# Patient Record
Sex: FEMALE | Race: WHITE | Employment: FULL TIME | URBAN - METROPOLITAN AREA
[De-identification: names, ages, dates, MRNs, and addresses within clinical notes are randomized per-mention and may not be internally consistent; named-entity substitution may affect disease eponyms.]

---

## 2020-03-18 ENCOUNTER — OFFICE VISIT (OUTPATIENT)
Dept: FAMILY MEDICINE CLINIC | Age: 65
End: 2020-03-18
Payer: COMMERCIAL

## 2020-03-18 VITALS
DIASTOLIC BLOOD PRESSURE: 86 MMHG | HEART RATE: 70 BPM | WEIGHT: 165.2 LBS | OXYGEN SATURATION: 98 % | TEMPERATURE: 98.2 F | HEIGHT: 67 IN | BODY MASS INDEX: 25.93 KG/M2 | SYSTOLIC BLOOD PRESSURE: 132 MMHG

## 2020-03-18 LAB
BILIRUBIN, POC: NEGATIVE
BLOOD URINE, POC: NEGATIVE
CLARITY, POC: CLEAR
COLOR, POC: YELLOW
GLUCOSE URINE, POC: NEGATIVE
KETONES, POC: NEGATIVE
LEUKOCYTE EST, POC: NORMAL
NITRITE, POC: NEGATIVE
PH, POC: 5
PROTEIN, POC: NEGATIVE
SPECIFIC GRAVITY, POC: 1.03
UROBILINOGEN, POC: NEGATIVE

## 2020-03-18 PROCEDURE — 99386 PREV VISIT NEW AGE 40-64: CPT | Performed by: NURSE PRACTITIONER

## 2020-03-18 PROCEDURE — 81003 URINALYSIS AUTO W/O SCOPE: CPT | Performed by: NURSE PRACTITIONER

## 2020-03-18 RX ORDER — ATORVASTATIN CALCIUM 20 MG/1
20 TABLET, FILM COATED ORAL DAILY
Qty: 30 TABLET | Refills: 5 | Status: SHIPPED | OUTPATIENT
Start: 2020-03-18 | End: 2020-09-14

## 2020-03-18 RX ORDER — OMEPRAZOLE 20 MG/1
20 CAPSULE, DELAYED RELEASE ORAL DAILY
Qty: 30 CAPSULE | Refills: 5 | Status: SHIPPED | OUTPATIENT
Start: 2020-03-18 | End: 2021-07-20

## 2020-03-18 RX ORDER — MULTIVIT WITH MINERALS/LUTEIN
1000 TABLET ORAL DAILY
COMMUNITY

## 2020-03-18 RX ORDER — OMEPRAZOLE 20 MG/1
20 CAPSULE, DELAYED RELEASE ORAL DAILY
COMMUNITY
End: 2020-03-18 | Stop reason: SDUPTHER

## 2020-03-18 RX ORDER — ATORVASTATIN CALCIUM 20 MG/1
20 TABLET, FILM COATED ORAL DAILY
COMMUNITY
End: 2020-03-18 | Stop reason: SDUPTHER

## 2020-03-18 SDOH — SOCIAL STABILITY: SOCIAL INSECURITY: WITHIN THE LAST YEAR, HAVE YOU BEEN AFRAID OF YOUR PARTNER OR EX-PARTNER?: PATIENT DECLINED

## 2020-03-18 SDOH — ECONOMIC STABILITY: FOOD INSECURITY: WITHIN THE PAST 12 MONTHS, THE FOOD YOU BOUGHT JUST DIDN'T LAST AND YOU DIDN'T HAVE MONEY TO GET MORE.: NEVER TRUE

## 2020-03-18 SDOH — ECONOMIC STABILITY: TRANSPORTATION INSECURITY
IN THE PAST 12 MONTHS, HAS LACK OF TRANSPORTATION KEPT YOU FROM MEETINGS, WORK, OR FROM GETTING THINGS NEEDED FOR DAILY LIVING?: NO

## 2020-03-18 SDOH — HEALTH STABILITY: MENTAL HEALTH
STRESS IS WHEN SOMEONE FEELS TENSE, NERVOUS, ANXIOUS, OR CAN'T SLEEP AT NIGHT BECAUSE THEIR MIND IS TROUBLED. HOW STRESSED ARE YOU?: ONLY A LITTLE

## 2020-03-18 SDOH — HEALTH STABILITY: PHYSICAL HEALTH: ON AVERAGE, HOW MANY MINUTES DO YOU ENGAGE IN EXERCISE AT THIS LEVEL?: 0 MIN

## 2020-03-18 SDOH — SOCIAL STABILITY: SOCIAL INSECURITY
WITHIN THE LAST YEAR, HAVE YOU BEEN HUMILIATED OR EMOTIONALLY ABUSED IN OTHER WAYS BY YOUR PARTNER OR EX-PARTNER?: PATIENT DECLINED

## 2020-03-18 SDOH — SOCIAL STABILITY: SOCIAL INSECURITY
WITHIN THE LAST YEAR, HAVE TO BEEN RAPED OR FORCED TO HAVE ANY KIND OF SEXUAL ACTIVITY BY YOUR PARTNER OR EX-PARTNER?: PATIENT DECLINED

## 2020-03-18 SDOH — SOCIAL STABILITY: SOCIAL NETWORK
IN A TYPICAL WEEK, HOW MANY TIMES DO YOU TALK ON THE PHONE WITH FAMILY, FRIENDS, OR NEIGHBORS?: MORE THAN THREE TIMES A WEEK

## 2020-03-18 SDOH — SOCIAL STABILITY: SOCIAL NETWORK
DO YOU BELONG TO ANY CLUBS OR ORGANIZATIONS SUCH AS CHURCH GROUPS UNIONS, FRATERNAL OR ATHLETIC GROUPS, OR SCHOOL GROUPS?: NO

## 2020-03-18 SDOH — SOCIAL STABILITY: SOCIAL NETWORK: HOW OFTEN DO YOU GET TOGETHER WITH FRIENDS OR RELATIVES?: THREE TIMES A WEEK

## 2020-03-18 SDOH — HEALTH STABILITY: PHYSICAL HEALTH: ON AVERAGE, HOW MANY DAYS PER WEEK DO YOU ENGAGE IN MODERATE TO STRENUOUS EXERCISE (LIKE A BRISK WALK)?: 0 DAYS

## 2020-03-18 SDOH — SOCIAL STABILITY: SOCIAL NETWORK: HOW OFTEN DO YOU ATTENT MEETINGS OF THE CLUB OR ORGANIZATION YOU BELONG TO?: NEVER

## 2020-03-18 SDOH — SOCIAL STABILITY: SOCIAL INSECURITY
WITHIN THE LAST YEAR, HAVE YOU BEEN KICKED, HIT, SLAPPED, OR OTHERWISE PHYSICALLY HURT BY YOUR PARTNER OR EX-PARTNER?: PATIENT DECLINED

## 2020-03-18 SDOH — ECONOMIC STABILITY: INCOME INSECURITY: HOW HARD IS IT FOR YOU TO PAY FOR THE VERY BASICS LIKE FOOD, HOUSING, MEDICAL CARE, AND HEATING?: NOT HARD AT ALL

## 2020-03-18 SDOH — SOCIAL STABILITY: SOCIAL NETWORK: ARE YOU MARRIED, WIDOWED, DIVORCED, SEPARATED, NEVER MARRIED, OR LIVING WITH A PARTNER?: DIVORCED

## 2020-03-18 SDOH — ECONOMIC STABILITY: FOOD INSECURITY: WITHIN THE PAST 12 MONTHS, YOU WORRIED THAT YOUR FOOD WOULD RUN OUT BEFORE YOU GOT MONEY TO BUY MORE.: NEVER TRUE

## 2020-03-18 SDOH — SOCIAL STABILITY: SOCIAL NETWORK: HOW OFTEN DO YOU ATTEND CHURCH OR RELIGIOUS SERVICES?: 1 TO 4 TIMES PER YEAR

## 2020-03-18 SDOH — ECONOMIC STABILITY: TRANSPORTATION INSECURITY
IN THE PAST 12 MONTHS, HAS THE LACK OF TRANSPORTATION KEPT YOU FROM MEDICAL APPOINTMENTS OR FROM GETTING MEDICATIONS?: NO

## 2020-03-18 ASSESSMENT — ENCOUNTER SYMPTOMS
SHORTNESS OF BREATH: 0
VOMITING: 0
SORE THROAT: 0
DIARRHEA: 0
NAUSEA: 0
CONSTIPATION: 0
RHINORRHEA: 0
COUGH: 0

## 2020-03-18 ASSESSMENT — PATIENT HEALTH QUESTIONNAIRE - PHQ9
1. LITTLE INTEREST OR PLEASURE IN DOING THINGS: 0
SUM OF ALL RESPONSES TO PHQ9 QUESTIONS 1 & 2: 0
2. FEELING DOWN, DEPRESSED OR HOPELESS: 0
SUM OF ALL RESPONSES TO PHQ QUESTIONS 1-9: 0
SUM OF ALL RESPONSES TO PHQ QUESTIONS 1-9: 0

## 2020-03-18 NOTE — PROGRESS NOTES
DepressionSeverity: 1-4 = Minimal depression, 5-9 = Mild depression, 10-14 = Moderate depression, 15-19 = Moderately severe depression, 20-27 = Severe depression    Current Outpatient Medications   Medication Sig Dispense Refill    GLUCOSAMINE-CHONDROITIN ER PO Take by mouth daily      Ascorbic Acid (VITAMIN C) 1000 MG tablet Take 1,000 mg by mouth daily      Multiple Vitamins-Minerals (MULTIVITAMIN WOMEN 50+ PO) Take by mouth      omeprazole (PRILOSEC) 20 MG delayed release capsule Take 1 capsule by mouth daily 30 capsule 5    atorvastatin (LIPITOR) 20 MG tablet Take 1 tablet by mouth daily 30 tablet 5     No current facility-administered medications for this visit. Presents to office today to establish care. Was previously following with Primary provider at [facility]. This office is closer and to her employment for appointments. History of GERD, high cholesterol, total hysterectomy, history of UTI. She is a . Her  passed away about one year ago. She is currently living with her two sisters in the home they grew up in. She reports they mostly get along and help each other when needed. She is still working. Denies smoking, alcohol use or drug use. Has a good appetite. Drinks water and coffee occasionally. Does not exercise daily. Only concern today is continued urinary incontinence; has to use incontinence pad daily. Reports she can feel something \"down there\" and does know what it is. Will do simple pelvic to assess today. Urinary Tract Infection    Pertinent negatives include no nausea, urgency or vomiting. Review of Systems   Constitutional: Negative for activity change, appetite change, fatigue and fever. HENT: Negative for congestion, rhinorrhea and sore throat. Due for dental cleaning  regular visits   Eyes: Negative for visual disturbance. Wears glasses  Exam next month   Respiratory: Negative for cough and shortness of breath.     Cardiovascular: Negative appointment with urology as soon as possible  Encouraged healthy diet and daily exercise  Follow up as discussed      Return in about 3 months (around 6/18/2020), or if symptoms worsen or fail to improve, for routine follow up. Dot received counseling on the following healthy behaviors: nutrition, exercise and medication adherence  Reviewed prior labs and health maintenance  Continue current medications, diet and exercise. Discussed use, benefit, and side effects of prescribed medications. Barriers to medication compliance addressed. Patient given educational materials - see patient instructions  Was a self-tracking handout given in paper form or via "[employer]t? Yes    Requested Prescriptions     Signed Prescriptions Disp Refills    omeprazole (PRILOSEC) 20 MG delayed release capsule 30 capsule 5     Sig: Take 1 capsule by mouth daily    atorvastatin (LIPITOR) 20 MG tablet 30 tablet 5     Sig: Take 1 tablet by mouth daily       All patient questions answered. Patient voiced understanding. Quality Measures    Body mass index is 25.87 kg/m². Elevated. Weight control planned discussed Healthy diet and regular exercise. BP: 132/86 Blood pressure is normal. Treatment plan consists of No treatment change needed.     No results found for: LDLCALC, LDLCHOLESTEROL, LDLDIRECT (goal LDL reduction with dx if diabetes is 50% LDL reduction)      PHQ Scores 3/18/2020   PHQ2 Score 0   PHQ9 Score 0     Interpretation of Total Score Depression Severity: 1-4 = Minimal depression, 5-9 = Mild depression, 10-14 = Moderate depression, 15-19 = Moderately severe depression, 20-27 = Severe depression          Electronically signed by ALANA Blas NP on 3/20/2020 at 3:37 PM

## 2020-05-20 ENCOUNTER — OFFICE VISIT (OUTPATIENT)
Dept: FAMILY MEDICINE CLINIC | Age: 65
End: 2020-05-20
Payer: COMMERCIAL

## 2020-05-20 VITALS
SYSTOLIC BLOOD PRESSURE: 120 MMHG | DIASTOLIC BLOOD PRESSURE: 80 MMHG | BODY MASS INDEX: 26.86 KG/M2 | WEIGHT: 171.5 LBS | TEMPERATURE: 98.2 F | OXYGEN SATURATION: 97 % | RESPIRATION RATE: 16 BRPM | HEART RATE: 73 BPM

## 2020-05-20 LAB
BILIRUBIN, POC: NEGATIVE
BLOOD URINE, POC: NEGATIVE
CLARITY, POC: CLEAR
COLOR, POC: YELLOW
GLUCOSE URINE, POC: NEGATIVE
KETONES, POC: NEGATIVE
LEUKOCYTE EST, POC: NEGATIVE
NITRITE, POC: NEGATIVE
PH, POC: 5
PROTEIN, POC: NEGATIVE
SPECIFIC GRAVITY, POC: >1.03
UROBILINOGEN, POC: NORMAL

## 2020-05-20 PROCEDURE — 99213 OFFICE O/P EST LOW 20 MIN: CPT | Performed by: NURSE PRACTITIONER

## 2020-05-20 PROCEDURE — 81002 URINALYSIS NONAUTO W/O SCOPE: CPT | Performed by: NURSE PRACTITIONER

## 2020-05-20 ASSESSMENT — ENCOUNTER SYMPTOMS
ABDOMINAL DISTENTION: 0
SHORTNESS OF BREATH: 0
WHEEZING: 0
CHEST TIGHTNESS: 0
ABDOMINAL PAIN: 0
CONSTIPATION: 1
COUGH: 0

## 2020-05-20 NOTE — PROGRESS NOTES
dizziness, syncope, weakness, light-headedness, numbness and headaches. Psychiatric/Behavioral: Positive for sleep disturbance. Negative for agitation and decreased concentration. The patient is not nervous/anxious. Objective:     Physical Exam  Vitals signs and nursing note reviewed. Constitutional:       Appearance: Normal appearance. She is well-developed and well-groomed. She is obese. Cardiovascular:      Rate and Rhythm: Normal rate and regular rhythm. Pulses: Normal pulses. Carotid pulses are 2+ on the right side and 2+ on the left side. Radial pulses are 2+ on the right side and 2+ on the left side. Heart sounds: Normal heart sounds, S1 normal and S2 normal. No murmur. Pulmonary:      Effort: Pulmonary effort is normal. No respiratory distress. Breath sounds: Normal breath sounds and air entry. No wheezing or rales. Abdominal:      General: Abdomen is protuberant. Bowel sounds are normal. There is no distension. Palpations: Abdomen is soft. Tenderness: There is no abdominal tenderness. There is no right CVA tenderness or left CVA tenderness. Musculoskeletal: Normal range of motion. Right lower leg: No edema. Left lower leg: No edema. Skin:     General: Skin is warm and dry. Capillary Refill: Capillary refill takes less than 2 seconds. Neurological:      Mental Status: She is alert and oriented to person, place, and time. Psychiatric:         Attention and Perception: Attention and perception normal.         Mood and Affect: Mood and affect normal.         Speech: Speech normal.         Behavior: Behavior normal. Behavior is cooperative. Thought Content: Thought content normal.         Cognition and Memory: Cognition and memory normal.         Judgment: Judgment normal.          Assessment:      1. Dysuria  - Urinalysis Reflex to Culture; Future -negative urinalysis    2.  Cystocele, unspecified (CODE)  Referral reprinted for urology and fax to their office    Plan:      Return for Call if systems do not improve or get worse. No orders of the defined types were placed in this encounter. No orders of the defined types were placed in this encounter. Reviewed health maintenance, prior labs and imaging. Patient given educational materials - see patient instructions. Discussed use, benefit, and side effects of prescribed medications. Barriers to medication compliance addressed. All patient questions answered. Pt voiced understanding to plan of care. Instructed to continue current medications, diet and exercise. Patient agreed with treatment plan. Follow up as directed below. Communication:      Items for Patient/Writer/Staff to Yuriy 224  Patient encouraged to follow-up with urology due to known cystocele. Urinalysis within office was negative, therefore not treating for urinary tract infection. Patient told to stop taking old prescription of Keflex.      Quality Measures  BMI Readings from Last 1 Encounters:   05/20/20 26.86 kg/m²        No results found for: LABA1C    BP Readings from Last 3 Encounters:   05/20/20 120/80   03/18/20 132/86        The ASCVD Risk score (Rufino Baekr, et al., 2013) failed to calculate for the following reasons:    Cannot find a previous HDL lab    Cannot find a previous total cholesterol lab    Unable to determine if patient is Non-      PHQ Scores 3/18/2020   PHQ2 Score 0   PHQ9 Score 0     Interpretation of Total Score Depression Severity: 1-4 = Minimal depression, 5-9 = Mild depression, 10-14 = Moderate depression, 15-19 = Moderately severe depression, 20-27 = Severe depression     Electronically signed by MARIA FERNANDA Hylton on 5/20/2020 at 4:33 PM

## 2020-07-20 ENCOUNTER — OFFICE VISIT (OUTPATIENT)
Dept: OBGYN CLINIC | Age: 65
End: 2020-07-20
Payer: COMMERCIAL

## 2020-07-20 VITALS
DIASTOLIC BLOOD PRESSURE: 77 MMHG | SYSTOLIC BLOOD PRESSURE: 138 MMHG | HEIGHT: 67 IN | HEART RATE: 73 BPM | WEIGHT: 164 LBS | BODY MASS INDEX: 25.74 KG/M2

## 2020-07-20 PROBLEM — N81.6 POSTERIOR VAGINAL WALL PROLAPSE: Status: ACTIVE | Noted: 2020-07-20

## 2020-07-20 PROBLEM — N39.3 STRESS INCONTINENCE: Status: ACTIVE | Noted: 2020-07-20

## 2020-07-20 PROCEDURE — 99203 OFFICE O/P NEW LOW 30 MIN: CPT | Performed by: OBSTETRICS & GYNECOLOGY

## 2020-07-20 ASSESSMENT — ENCOUNTER SYMPTOMS
BACK PAIN: 0
COUGH: 0
ABDOMINAL PAIN: 0
SHORTNESS OF BREATH: 0

## 2020-07-20 NOTE — PROGRESS NOTES
[facility]  [facility] - [address] [facility]  Dept: [phone]  Dept Fax: [phone]    20    Chief Complaint   Patient presents with    Other     referred by Dr. Temple Staff for prolapse       Jeff Cameron 72 y.o.  female was referred to me by Dr Temple Staff for posterior vaginal prolapse. She was seeing him for stress incontinence and he recommended that she come here for her vaginal prolapse. She wears pads daily for her incontinence because she leaks every day. She says that she has noticed a bulge in the vagina and sometimes it hurts. She has had 3 SVDs, but denies any VAVD or forceps. She repeatedly lifts with her job and says at the end of the day the bulge is worse. She denies that the bulge ever came outside of the vagina. She denies any h/o constipation. She has a h/o TVH in 2017 with anterior and posterior repair. Review of Systems   Constitutional: Negative for chills and fever. HENT: Negative for congestion. Respiratory: Negative for cough and shortness of breath. Cardiovascular: Negative for chest pain and palpitations. Gastrointestinal: Negative for abdominal pain. Musculoskeletal: Negative for back pain. Neurological: Negative for dizziness and light-headedness. Psychiatric/Behavioral: The patient is not nervous/anxious. Gynecologic History  No LMP recorded. Patient has had a hysterectomy.   Contraception: post menopausal status  Last Pap:   Results: normal  Last Mammogram: 2019  Results: normal    Obstetric History  : 3  Para: 3  AB: 0    Past Medical History:   Diagnosis Date    Elevated cholesterol     GERD with stricture     Hypoglycemia      Past Surgical History:   Procedure Laterality Date    DILATION AND CURETTAGE      FOOT SURGERY      left big toe hemangiomaremoved, benign    HYSTERECTOMY  2007    TVH withBSO , A&P repair    HYSTERECTOMY      HYSTERECTOMY, TOTAL ABDOMINAL      HYSTEROSCOPY  03/2007    novasure    LAPAROSCOPY  03/2007    BTL     Allergies   Allergen Reactions    Bactrim Other (See Comments)     Palpitations      Ciprofloxacin     Nitrofurantoin     Sulfa Antibiotics     Zocor [Simvastatin] Other (See Comments)     Muscle cramps     Current Outpatient Medications   Medication Sig Dispense Refill    GLUCOSAMINE-CHONDROITIN ER PO Take by mouth daily      Ascorbic Acid (VITAMIN C) 1000 MG tablet Take 1,000 mg by mouth daily      Multiple Vitamins-Minerals (MULTIVITAMIN WOMEN 50+ PO) Take by mouth      omeprazole (PRILOSEC) 20 MG delayed release capsule Take 1 capsule by mouth daily 30 capsule 5    atorvastatin (LIPITOR) 20 MG tablet Take 1 tablet by mouth daily 30 tablet 5     No current facility-administered medications for this visit. 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   Social Needs    Financial resource strain: Not hard at all   Streak insecurity     Worry: Never true     Inability: Never true   [employer] needs     Medical: No     Non-medical: No   Tobacco Use    Smoking status: Never Smoker    Smokeless tobacco: Never Used   Substance and Sexual Activity    Alcohol use: No    Drug use: No    Sexual activity: Not Currently   Lifestyle    Physical activity     Days per week: 0 days     Minutes per session: 0 min    Stress: Only a little   Relationships    Social connections     Talks on phone: More than three times a week     Gets together:  Three times a week     Attends [employer] service: 1 to 4 times per year     Active member of club or organization: No     Attends meetings of clubs or organizations: Never     Relationship status:     Intimate partner violence     Fear of current or ex partner: Patient refused     Emotionally abused: Patient refused     Physically abused: Patient refused     Forced sexual activity: Patient refused   Other Topics Concern    Not on file   Social History Narrative    Not on file     Family History   Problem Relation Age of Onset    Heart Disease Maternal Grandmother         CHF    Stroke Paternal Grandmother     Heart Disease Mother     Cancer Mother     Heart Disease Father     Cancer Father        Physical exam Physical Exam  Constitutional:       Appearance: Normal appearance. She is normal weight. HENT:      Head: Normocephalic. Eyes:      Extraocular Movements: Extraocular movements intact. Neurological:      Mental Status: She is alert and oriented to person, place, and time. Psychiatric:         Mood and Affect: Mood normal.         Behavior: Behavior normal.         Thought Content: Thought content normal.         Judgment: Judgment normal.         Assessment/Plan  1. Posterior vaginal wall prolapse  2. Vaginal enterocele  - Discussed options of PT vs pessary vs surgery. Discussed that with pessaries they help with different things and one might be helpful for stress incontinence, but might not help with the prolapse while it might be vise versa with others. Discussed risks/benefits with each option. Pt would like to proceed with surgery to hopefully help both of her issues at one time. She understands that this will hopefully help make her quality of life better, but may not fix it completely. Discussed risks of surgery in normal detailed fashion. All questions answered. Will schedule pt for posterior colporrhaphy with Dr Michael Newell placing a midurethral sling.       3. Stress urinary incontinence  - Seeing Dr Michael Newell, she would like a joint procedure with him placing a sling    Will have pt see PCP for medical clearance    Melvin Ayers MD  [address]

## 2020-07-21 ENCOUNTER — TELEPHONE (OUTPATIENT)
Dept: FAMILY MEDICINE CLINIC | Age: 65
End: 2020-07-21

## 2020-07-21 NOTE — TELEPHONE ENCOUNTER
Patient called in and said she is going to need surgery from [facility] note ob-gyn patients rectum needs repaired and patient was told to call in for Pre-op clearance being that she is going to need to be cleared for Surgery. Patient was asked to give dates but she Scheduled appointment with PCP for Pre-Op clearance.   Patient said she is going to talk with both surgeons to see how soon they wanted to do the surgery patient scheduled pre-op for 08- but patient was going to contact both offices and providers as well to see if they wanted a pre-op clearance or were they going to do their own pre-op testing so patient kept scheduled pre-op for now patient needs a 4:30 or after appointment so patient said she will contact office if it needs to be pushed up sooner for Surgeon request.

## 2020-07-23 ENCOUNTER — TELEPHONE (OUTPATIENT)
Dept: OBGYN CLINIC | Age: 65
End: 2020-07-23

## 2020-07-29 ENCOUNTER — TELEPHONE (OUTPATIENT)
Dept: OBGYN CLINIC | Age: 65
End: 2020-07-29

## 2020-08-03 ENCOUNTER — TELEPHONE (OUTPATIENT)
Dept: OBGYN CLINIC | Age: 65
End: 2020-08-03

## 2020-08-19 ENCOUNTER — OFFICE VISIT (OUTPATIENT)
Dept: FAMILY MEDICINE CLINIC | Age: 65
End: 2020-08-19
Payer: COMMERCIAL

## 2020-08-19 VITALS
OXYGEN SATURATION: 98 % | BODY MASS INDEX: 25.58 KG/M2 | RESPIRATION RATE: 14 BRPM | HEIGHT: 67 IN | TEMPERATURE: 96.5 F | DIASTOLIC BLOOD PRESSURE: 80 MMHG | WEIGHT: 163 LBS | SYSTOLIC BLOOD PRESSURE: 120 MMHG | HEART RATE: 68 BPM

## 2020-08-19 PROCEDURE — 99214 OFFICE O/P EST MOD 30 MIN: CPT | Performed by: NURSE PRACTITIONER

## 2020-08-19 ASSESSMENT — PATIENT HEALTH QUESTIONNAIRE - PHQ9
1. LITTLE INTEREST OR PLEASURE IN DOING THINGS: 0
SUM OF ALL RESPONSES TO PHQ QUESTIONS 1-9: 0
SUM OF ALL RESPONSES TO PHQ QUESTIONS 1-9: 0
2. FEELING DOWN, DEPRESSED OR HOPELESS: 0
SUM OF ALL RESPONSES TO PHQ9 QUESTIONS 1 & 2: 0

## 2020-08-19 NOTE — PROGRESS NOTES
Subjective:      Patient ID: Gloria Tiwari is a 72 y.o. female. Visit Information    Have you changed or started any medications since your last visit including any over-the-counter medicines, vitamins, or herbal medicines? no   Are you having any side effects from any of your medications? -  no  Have you stopped taking any of your medications? Is so, why? -  no    Have you seen any other physician or provider since your last visit? Dr Caden Archibald   Have you had any other diagnostic tests since your last visit? No  Have you been seen in the emergency room and/or had an admission to a hospital since we last saw you? No  Have you had your routine dental cleaning in the past 6 months? yes -     Have you activated your [employer] account? If not, what are your barriers?  Yes     Patient Care Team:  ALANA Ramirez NP as PCP - General (Nurse Practitioner)  ALANA Ramirez NP as PCP - [facility] Provider    Medical History Review  Past Medical, Family, and Social History reviewed and does contribute to the patient presenting condition    Health Maintenance   Topic Date Due    Breast cancer screen  05/13/2005    Colon cancer screen colonoscopy  05/13/2005    DTaP/Tdap/Td vaccine (1 - Tdap) 09/09/2020 (Originally 5/13/1974)    Pneumococcal 65+ years Vaccine (1 of 1 - PPSV23) 02/19/2021 (Originally 5/13/2020)    Lipid screen  08/19/2021 (Originally 5/13/1965)    DEXA (modify frequency per FRAX score)  08/19/2021 (Originally 5/13/2010)    Shingles Vaccine (1 of 2) 08/19/2021 (Originally 5/13/2005)    Diabetes screen  08/19/2021 (Originally 5/13/1995)    Hepatitis C screen  08/19/2021 (Originally 1955)    HIV screen  08/19/2021 (Originally 5/13/1970)    Flu vaccine (1) 09/01/2020    Hepatitis A vaccine  Aged Out    Hepatitis B vaccine  Aged Out    Hib vaccine  Aged Out    Meningococcal (ACWY) vaccine  Aged Out     /80 (Site: Left Upper Arm, Position: Sitting, Cuff Size: Medium Adult) Pulse 68   Temp 96.5 °F (35.8 °C) (Temporal)   Resp 14   Ht 5' 7\" (1.702 m)   Wt 163 lb (73.9 kg)   SpO2 98%   BMI 25.53 kg/m²      PHQ Scores 8/19/2020 3/18/2020   PHQ2 Score 0 0   PHQ9 Score 0 0     Interpretation of Total Score DepressionSeverity: 1-4 = Minimal depression, 5-9 = Mild depression, 10-14 = Moderate depression, 15-19 = Moderately severe depression, 20-27 = Severe depression    Current Outpatient Medications   Medication Sig Dispense Refill    GLUCOSAMINE-CHONDROITIN ER PO Take by mouth daily      Ascorbic Acid (VITAMIN C) 1000 MG tablet Take 1,000 mg by mouth daily      Multiple Vitamins-Minerals (MULTIVITAMIN WOMEN 50+ PO) Take by mouth      omeprazole (PRILOSEC) 20 MG delayed release capsule Take 1 capsule by mouth daily 30 capsule 5    atorvastatin (LIPITOR) 20 MG tablet Take 1 tablet by mouth daily 30 tablet 5     No current facility-administered medications for this visit. Presents to office today for preop clearance for upcoming surgery. Preop testing is scheduled for August 28. Planned procedure includes :posterior colporrhaphy: repair of vaginal wall prolapse, vaginal enterocele, bladder suspension for stress urinary incontinence. Surgery is scheduled on September 6, 2020 with Dr. Dennys Richards. Patient also reports she will need [facility] paperwork. Intention is for her to be off work for 4 weeks. Explained to patient that I will need to review preop testing prior to clearing for surgery. As soon as that is complete, I can give my opinion. She verbalized understanding. She does not have any other concerns today. She has a good appetite. Drinking fluids. Normal bowel and bladder pattern. Sleeping okay. Denies any depression or anxiety concerns today. Review of Systems   Constitutional: Negative for activity change, appetite change, fatigue and fever. HENT: Negative for congestion, rhinorrhea and sore throat. Eyes: Negative for visual disturbance.         Wears glasses     Respiratory: Negative for cough and shortness of breath. Cardiovascular: Negative for chest pain and palpitations. Gastrointestinal: Negative for constipation, diarrhea, nausea and vomiting. History of GERD  History of esophageal stricture-stretched 2x   Genitourinary: Negative for dysuria and urgency. Bladder incontinence  Can feel something \"down there\"-upcoming surgery   Allergic/Immunologic: Positive for environmental allergies. Negative for immunocompromised state. Neurological: Negative for syncope, light-headedness and headaches. Psychiatric/Behavioral: Negative for decreased concentration, dysphoric mood, sleep disturbance and suicidal ideas. The patient is not nervous/anxious. Objective:   Physical Exam  Vitals signs and nursing note reviewed. Constitutional:       General: She is not in acute distress. Appearance: Normal appearance. She is well-developed and well-groomed. She is not ill-appearing. HENT:      Head: Normocephalic and atraumatic. Right Ear: Hearing and external ear normal.      Left Ear: Hearing and external ear normal.      Nose: Nose normal.      Mouth/Throat:      Lips: Pink. Mouth: Mucous membranes are moist.      Pharynx: Oropharynx is clear. Uvula midline. Eyes:      Conjunctiva/sclera: Conjunctivae normal.      Pupils: Pupils are equal, round, and reactive to light. Neck:      Musculoskeletal: Normal range of motion. Thyroid: No thyroid mass. Trachea: Trachea normal.   Cardiovascular:      Rate and Rhythm: Normal rate and regular rhythm. Pulses: Normal pulses. Carotid pulses are 2+ on the right side and 2+ on the left side. Radial pulses are 2+ on the right side and 2+ on the left side. Dorsalis pedis pulses are 2+ on the right side and 2+ on the left side. Posterior tibial pulses are 2+ on the right side and 2+ on the left side.       Heart sounds: Normal heart sounds, S1 normal and S2 normal. No murmur. Pulmonary:      Effort: Pulmonary effort is normal.      Breath sounds: Normal breath sounds. No decreased breath sounds or wheezing. Abdominal:      General: Bowel sounds are normal.      Palpations: Abdomen is soft. Tenderness: There is no abdominal tenderness. Musculoskeletal:      Right lower leg: No edema. Left lower leg: No edema. Lymphadenopathy:      Cervical: No cervical adenopathy. Skin:     General: Skin is warm and dry. Capillary Refill: Capillary refill takes less than 2 seconds. Neurological:      Mental Status: She is alert and oriented to person, place, and time. GCS: GCS eye subscore is 4. GCS verbal subscore is 5. GCS motor subscore is 6. Motor: Motor function is intact. Coordination: Coordination is intact. Gait: Gait is intact. Psychiatric:         Attention and Perception: Attention normal.         Mood and Affect: Mood normal.         Speech: Speech normal.         Behavior: Behavior normal. Behavior is cooperative. Thought Content: Thought content normal.         Cognition and Memory: Cognition normal.         Judgment: Judgment normal.         Assessment / Plan:     1. Pre-op evaluation  2. Posterior vaginal wall prolapse  3. Cystocele, unspecified (CODE)  Upcoming planned surgery    4. Breast cancer screening by mammogram  Routine  - TWIN DIGITAL SCREEN W OR WO CAD BILATERAL; Future    We will review preop testing once completed at the end of the month. Will give medical opinion at that time. Encouraged healthy diet  Encouraged adequate fluid intake  Encouraged consistent follow-up with all upcoming appointments  Monitor for any increase in symptoms  Call office with any questions or concerns    Return in about 3 months (around 11/19/2020) for follow up to testing.           Electronically signed by ALANA Hoff NP on 8/21/2020 at 1:48 PM

## 2020-08-21 ASSESSMENT — ENCOUNTER SYMPTOMS
COUGH: 0
CONSTIPATION: 0
DIARRHEA: 0
RHINORRHEA: 0
SORE THROAT: 0
NAUSEA: 0
VOMITING: 0
SHORTNESS OF BREATH: 0

## 2020-08-28 ENCOUNTER — TELEPHONE (OUTPATIENT)
Dept: FAMILY MEDICINE CLINIC | Age: 65
End: 2020-08-28

## 2020-08-28 ENCOUNTER — HOSPITAL ENCOUNTER (OUTPATIENT)
Dept: PREADMISSION TESTING | Age: 65
Discharge: HOME | End: 2020-09-01
Payer: COMMERCIAL

## 2020-08-28 ENCOUNTER — TELEPHONE (OUTPATIENT)
Dept: OBGYN CLINIC | Age: 65
End: 2020-08-28

## 2020-08-28 VITALS
HEART RATE: 57 BPM | OXYGEN SATURATION: 98 % | BODY MASS INDEX: 25.44 KG/M2 | HEIGHT: 67 IN | DIASTOLIC BLOOD PRESSURE: 78 MMHG | RESPIRATION RATE: 20 BRPM | TEMPERATURE: 97.3 F | SYSTOLIC BLOOD PRESSURE: 128 MMHG | WEIGHT: 162.08 LBS

## 2020-08-28 DIAGNOSIS — R94.31 ABNORMAL ECG: Primary | ICD-10-CM

## 2020-08-28 LAB
ABO/RH: NORMAL
ANION GAP SERPL CALCULATED.3IONS-SCNC: 10 MMOL/L (ref 9–17)
ANTIBODY SCREEN: NEGATIVE
ARM BAND NUMBER: NORMAL
BUN BLDV-MCNC: 12 MG/DL (ref 8–23)
CHLORIDE BLD-SCNC: 105 MMOL/L (ref 98–107)
CO2: 29 MMOL/L (ref 20–31)
CREAT SERPL-MCNC: 0.59 MG/DL (ref 0.5–0.9)
EXPIRATION DATE: NORMAL
GFR AFRICAN AMERICAN: >60 ML/MIN
GFR NON-AFRICAN AMERICAN: >60 ML/MIN
GFR SERPL CREATININE-BSD FRML MDRD: NORMAL ML/MIN/{1.73_M2}
GFR SERPL CREATININE-BSD FRML MDRD: NORMAL ML/MIN/{1.73_M2}
GLUCOSE BLD-MCNC: 97 MG/DL (ref 70–99)
HCT VFR BLD CALC: 43 % (ref 36.3–47.1)
HEMOGLOBIN: 14 G/DL (ref 11.9–15.1)
MCH RBC QN AUTO: 29.3 PG (ref 25.2–33.5)
MCHC RBC AUTO-ENTMCNC: 32.6 G/DL (ref 28.4–34.8)
MCV RBC AUTO: 90 FL (ref 82.6–102.9)
NRBC AUTOMATED: 0 PER 100 WBC
PDW BLD-RTO: 12.8 % (ref 11.8–14.4)
PLATELET # BLD: 164 K/UL (ref 138–453)
PMV BLD AUTO: 12 FL (ref 8.1–13.5)
POTASSIUM SERPL-SCNC: 3.8 MMOL/L (ref 3.7–5.3)
RBC # BLD: 4.78 M/UL (ref 3.95–5.11)
SODIUM BLD-SCNC: 144 MMOL/L (ref 135–144)
WBC # BLD: 5.3 K/UL (ref 3.5–11.3)

## 2020-08-28 PROCEDURE — 80051 ELECTROLYTE PANEL: CPT

## 2020-08-28 PROCEDURE — 82565 ASSAY OF CREATININE: CPT

## 2020-08-28 PROCEDURE — 93005 ELECTROCARDIOGRAM TRACING: CPT | Performed by: ANESTHESIOLOGY

## 2020-08-28 PROCEDURE — 36415 COLL VENOUS BLD VENIPUNCTURE: CPT

## 2020-08-28 PROCEDURE — 86901 BLOOD TYPING SEROLOGIC RH(D): CPT

## 2020-08-28 PROCEDURE — 86900 BLOOD TYPING SEROLOGIC ABO: CPT

## 2020-08-28 PROCEDURE — 84520 ASSAY OF UREA NITROGEN: CPT

## 2020-08-28 PROCEDURE — 86850 RBC ANTIBODY SCREEN: CPT

## 2020-08-28 PROCEDURE — 85027 COMPLETE CBC AUTOMATED: CPT

## 2020-08-28 PROCEDURE — 82947 ASSAY GLUCOSE BLOOD QUANT: CPT

## 2020-08-28 RX ORDER — SODIUM CHLORIDE, SODIUM LACTATE, POTASSIUM CHLORIDE, CALCIUM CHLORIDE 600; 310; 30; 20 MG/100ML; MG/100ML; MG/100ML; MG/100ML
1000 INJECTION, SOLUTION INTRAVENOUS CONTINUOUS
Status: CANCELLED | OUTPATIENT
Start: 2020-08-28

## 2020-08-28 RX ORDER — SCOLOPAMINE TRANSDERMAL SYSTEM 1 MG/1
1 PATCH, EXTENDED RELEASE TRANSDERMAL
Status: CANCELLED | OUTPATIENT
Start: 2020-08-28

## 2020-08-28 NOTE — PROGRESS NOTES
Anesthesia Focused Assessment    STOP-BANG Sleep Apnea Questionnaire    SNORE loudly (heard through closed doors)? Yes  TIRED, fatigued, sleepy during daytime? No  OBSERVED stopping breathing during sleep? No  High blood PRESSURE being treated? No    BMI over 35? No  AGE over 48? Yes  NECK circumference over 16\"? No  GENDER (male)? No             Total 2  High risk 5-8  Intermediate risk 3-4  Low risk 0-2    Obstructive Sleep Apnea: denies but does snore loudly  If YES, machine used: no     Type 1 DM:   no  T2DM:  no    Coronary Artery Disease:  no  Hypertension:  no    Active smoker:  no  Drinks Alcohol:  no    Dentition: benign    Defib / AICD / Pacemaker: no      Renal Failure/dialysis:  no    Patient was evaluated in PAT & anesthesia guidelines were applied. NPO guidelines, medication instructions and scheduled arrival time were reviewed with patient. Hx of anesthesia complications:  no  Family hx of anesthesia complications:  no                                                                                                                     Anesthesia contacted:   Yes, Dr. Kimber Lerner or cardiac clearance ordered: yes, PCP with attention to EKG.     Sim Body PA-C  8/28/20  10:20 AM

## 2020-08-28 NOTE — TELEPHONE ENCOUNTER
Shabana from Pre-op is calling to let us know pt will need a medical clearance for her surgery on 9/10/20. They are working on it but just wanted to let you know.

## 2020-08-28 NOTE — TELEPHONE ENCOUNTER
Patient is calling stating that she had all the testing done would like to know if she cleared to surgery.     Please advise

## 2020-08-29 LAB
EKG ATRIAL RATE: 52 BPM
EKG P AXIS: 68 DEGREES
EKG P-R INTERVAL: 170 MS
EKG Q-T INTERVAL: 466 MS
EKG QRS DURATION: 74 MS
EKG QTC CALCULATION (BAZETT): 433 MS
EKG R AXIS: 6 DEGREES
EKG T AXIS: 35 DEGREES
EKG VENTRICULAR RATE: 52 BPM

## 2020-08-31 ENCOUNTER — TELEPHONE (OUTPATIENT)
Dept: FAMILY MEDICINE CLINIC | Age: 65
End: 2020-08-31

## 2020-09-01 NOTE — TELEPHONE ENCOUNTER
Called patient per Mary Andre NP, would like patient to have stress test at a [facility] facility to get the results faster.

## 2020-09-03 ENCOUNTER — HOSPITAL ENCOUNTER (OUTPATIENT)
Dept: NUCLEAR MEDICINE | Age: 65
Discharge: HOME | End: 2020-09-05
Payer: COMMERCIAL

## 2020-09-03 ENCOUNTER — HOSPITAL ENCOUNTER (OUTPATIENT)
Dept: NON INVASIVE DIAGNOSTICS | Age: 65
Discharge: HOME | End: 2020-09-03
Payer: COMMERCIAL

## 2020-09-03 ENCOUNTER — HOSPITAL ENCOUNTER (OUTPATIENT)
Dept: NUCLEAR MEDICINE | Age: 65
Discharge: HOME | End: 2020-09-05
Payer: MEDICARE

## 2020-09-03 VITALS — RESPIRATION RATE: 16 BRPM | SYSTOLIC BLOOD PRESSURE: 158 MMHG | DIASTOLIC BLOOD PRESSURE: 78 MMHG | HEART RATE: 67 BPM

## 2020-09-03 LAB
LV EF: 86 %
LVEF MODALITY: NORMAL

## 2020-09-03 PROCEDURE — 93017 CV STRESS TEST TRACING ONLY: CPT

## 2020-09-03 PROCEDURE — 3430000000 HC RX DIAGNOSTIC RADIOPHARMACEUTICAL: Performed by: NURSE PRACTITIONER

## 2020-09-03 PROCEDURE — A9500 TC99M SESTAMIBI: HCPCS | Performed by: NURSE PRACTITIONER

## 2020-09-03 PROCEDURE — 2580000003 HC RX 258: Performed by: NURSE PRACTITIONER

## 2020-09-03 PROCEDURE — 78452 HT MUSCLE IMAGE SPECT MULT: CPT

## 2020-09-03 RX ORDER — ALBUTEROL SULFATE 2.5 MG/3ML
2.5 SOLUTION RESPIRATORY (INHALATION) EVERY 6 HOURS PRN
Status: DISCONTINUED | OUTPATIENT
Start: 2020-09-03 | End: 2020-09-04 | Stop reason: HOSPADM

## 2020-09-03 RX ORDER — SODIUM CHLORIDE 0.9 % (FLUSH) 0.9 %
10 SYRINGE (ML) INJECTION PRN
Status: DISCONTINUED | OUTPATIENT
Start: 2020-09-03 | End: 2020-09-03 | Stop reason: HOSPADM

## 2020-09-03 RX ORDER — ATROPINE SULFATE 0.1 MG/ML
0.5 INJECTION INTRAVENOUS EVERY 5 MIN PRN
Status: DISCONTINUED | OUTPATIENT
Start: 2020-09-03 | End: 2020-09-03 | Stop reason: HOSPADM

## 2020-09-03 RX ORDER — SODIUM CHLORIDE 9 MG/ML
500 INJECTION, SOLUTION INTRAVENOUS CONTINUOUS PRN
Status: DISCONTINUED | OUTPATIENT
Start: 2020-09-03 | End: 2020-09-03 | Stop reason: HOSPADM

## 2020-09-03 RX ORDER — METOPROLOL TARTRATE 5 MG/5ML
5 INJECTION INTRAVENOUS EVERY 5 MIN PRN
Status: DISCONTINUED | OUTPATIENT
Start: 2020-09-03 | End: 2020-09-03 | Stop reason: HOSPADM

## 2020-09-03 RX ORDER — NITROGLYCERIN 0.4 MG/1
0.4 TABLET SUBLINGUAL EVERY 5 MIN PRN
Status: DISCONTINUED | OUTPATIENT
Start: 2020-09-03 | End: 2020-09-03 | Stop reason: HOSPADM

## 2020-09-03 RX ADMIN — SODIUM CHLORIDE 500 ML: 9 INJECTION, SOLUTION INTRAVENOUS at 07:08

## 2020-09-03 RX ADMIN — TETRAKIS(2-METHOXYISOBUTYLISOCYANIDE)COPPER(I) TETRAFLUOROBORATE 39.6 MILLICURIE: 1 INJECTION, POWDER, LYOPHILIZED, FOR SOLUTION INTRAVENOUS at 09:30

## 2020-09-03 RX ADMIN — TETRAKIS(2-METHOXYISOBUTYLISOCYANIDE)COPPER(I) TETRAFLUOROBORATE 17.4 MILLICURIE: 1 INJECTION, POWDER, LYOPHILIZED, FOR SOLUTION INTRAVENOUS at 07:10

## 2020-09-03 NOTE — PROCEDURES
[address]                 [address] [facility], [address] [facility]                              CARDIAC STRESS TEST    PATIENT NAME: Sallie Duncan                     :        1955  MED REC NO:   [id number]                             ROOM:  ACCOUNT NO:   [de-identified]                           ADMIT DATE: 2020  PROVIDER:     Fran Carlton    DATE OF STUDY:  2020    TREADMILL MYOVIEW STRESS TEST    ATTENDING PROVIDER:  Haris Mckeon, [facility]    PRIMARY CARE PROVIDER:  Haris Mckeon CNP    PERFORMING PHYSICIAN: Claudene Kotyk. Travon Hughes MD    INDICATION:  Abnormal EKG. HEART RATE  100% max predicted heart rate:  155  85% max predicted heart rate:  132  Duration:  6:53    Resting heart rate:  52  Maximum heart rate achieved:  141  % of predicted maximum:  90    BLOOD PRESSURE  Resting BP:  129/69  Peak BP:  160/80  Peak double product:  225/60  METS:  8.3    REASON FOR TERMINATION:  Fatigue and 85% of maximum predicted heart rate achieved. BASELINE EKG DEMONSTRATED:  Sinus rhythm. During the stress test, the patient reported: No symptoms. STRESS EKG DEMONSTRATED:  No abnormal change. HEART RATE RESPONSE:   Normal response. BLOOD PRESSURE RESPONSE:   Normal response. Functional capacity is: Average. The Duke treadmill score is 6:53 (specific only to the exercise  findings). Formula score: 6:53 minutes - 5*(0) - 4* (0) = 6:53    Duke score for this test is: Low risk (> to 5)    EKG IMPRESSION:  Negative. FINAL IMPRESSION:  Negative. Nuclear medicine report to follow.             Edel Pereira    D: 2020 8:31:43       T: 2020 8:33:53     [email or web address]  Job#: [id number]     Doc#: Unknown

## 2020-09-06 ENCOUNTER — HOSPITAL ENCOUNTER (OUTPATIENT)
Dept: PREADMISSION TESTING | Age: 65
Setting detail: SPECIMEN
Discharge: HOME | End: 2020-09-10
Payer: MEDICARE

## 2020-09-08 ENCOUNTER — TELEPHONE (OUTPATIENT)
Dept: OBGYN CLINIC | Age: 65
End: 2020-09-08

## 2020-09-08 NOTE — TELEPHONE ENCOUNTER
LM for Bertha Chavez as I am following up on a phone encounter in pt's chart regarding her clearance letter. Message says she was working on it.

## 2020-09-08 NOTE — TELEPHONE ENCOUNTER
LM for pt that we are waiting for her clearance letter for surgery.   Pt to call or have letter faxed today if possible

## 2020-09-10 ENCOUNTER — ANESTHESIA EVENT (OUTPATIENT)
Dept: OPERATING ROOM | Age: 65
End: 2020-09-10
Payer: COMMERCIAL

## 2020-09-10 ENCOUNTER — HOSPITAL ENCOUNTER (OUTPATIENT)
Age: 65
Setting detail: OUTPATIENT SURGERY
Discharge: HOME | End: 2020-09-10
Attending: OBSTETRICS & GYNECOLOGY | Admitting: OBSTETRICS & GYNECOLOGY
Payer: COMMERCIAL

## 2020-09-10 ENCOUNTER — ANESTHESIA (OUTPATIENT)
Dept: OPERATING ROOM | Age: 65
End: 2020-09-10
Payer: COMMERCIAL

## 2020-09-10 VITALS — SYSTOLIC BLOOD PRESSURE: 146 MMHG | TEMPERATURE: 95.6 F | DIASTOLIC BLOOD PRESSURE: 90 MMHG | OXYGEN SATURATION: 100 %

## 2020-09-10 VITALS
WEIGHT: 160.5 LBS | RESPIRATION RATE: 17 BRPM | TEMPERATURE: 97.9 F | BODY MASS INDEX: 25.19 KG/M2 | SYSTOLIC BLOOD PRESSURE: 130 MMHG | HEIGHT: 67 IN | HEART RATE: 69 BPM | OXYGEN SATURATION: 98 % | DIASTOLIC BLOOD PRESSURE: 83 MMHG

## 2020-09-10 PROBLEM — Z98.890 HISTORY OF REPAIR OF RECTOCELE: Status: ACTIVE | Noted: 2020-09-10

## 2020-09-10 LAB
SARS-COV-2, RAPID: NOT DETECTED
SARS-COV-2: NORMAL
SARS-COV-2: NORMAL
SOURCE: NORMAL

## 2020-09-10 PROCEDURE — 3700000000 HC ANESTHESIA ATTENDED CARE: Performed by: OBSTETRICS & GYNECOLOGY

## 2020-09-10 PROCEDURE — 57250 REPAIR RECTUM & VAGINA: CPT | Performed by: OBSTETRICS & GYNECOLOGY

## 2020-09-10 PROCEDURE — 3600000014 HC SURGERY LEVEL 4 ADDTL 15MIN: Performed by: OBSTETRICS & GYNECOLOGY

## 2020-09-10 PROCEDURE — 6360000002 HC RX W HCPCS: Performed by: NURSE ANESTHETIST, CERTIFIED REGISTERED

## 2020-09-10 PROCEDURE — 2500000003 HC RX 250 WO HCPCS: Performed by: OBSTETRICS & GYNECOLOGY

## 2020-09-10 PROCEDURE — 7100000041 HC SPAR PHASE II RECOVERY - ADDTL 15 MIN: Performed by: OBSTETRICS & GYNECOLOGY

## 2020-09-10 PROCEDURE — 2709999900 HC NON-CHARGEABLE SUPPLY: Performed by: OBSTETRICS & GYNECOLOGY

## 2020-09-10 PROCEDURE — U0002 COVID-19 LAB TEST NON-CDC: HCPCS

## 2020-09-10 PROCEDURE — 3600000004 HC SURGERY LEVEL 4 BASE: Performed by: OBSTETRICS & GYNECOLOGY

## 2020-09-10 PROCEDURE — 2580000003 HC RX 258: Performed by: ANESTHESIOLOGY

## 2020-09-10 PROCEDURE — 7100000040 HC SPAR PHASE II RECOVERY - FIRST 15 MIN: Performed by: OBSTETRICS & GYNECOLOGY

## 2020-09-10 PROCEDURE — 7100000001 HC PACU RECOVERY - ADDTL 15 MIN: Performed by: OBSTETRICS & GYNECOLOGY

## 2020-09-10 PROCEDURE — 2580000003 HC RX 258: Performed by: OBSTETRICS & GYNECOLOGY

## 2020-09-10 PROCEDURE — 3700000001 HC ADD 15 MINUTES (ANESTHESIA): Performed by: OBSTETRICS & GYNECOLOGY

## 2020-09-10 PROCEDURE — 7100000000 HC PACU RECOVERY - FIRST 15 MIN: Performed by: OBSTETRICS & GYNECOLOGY

## 2020-09-10 PROCEDURE — 2500000003 HC RX 250 WO HCPCS: Performed by: NURSE ANESTHETIST, CERTIFIED REGISTERED

## 2020-09-10 PROCEDURE — 6370000000 HC RX 637 (ALT 250 FOR IP): Performed by: OBSTETRICS & GYNECOLOGY

## 2020-09-10 PROCEDURE — C1771 REP DEV, URINARY, W/SLING: HCPCS | Performed by: OBSTETRICS & GYNECOLOGY

## 2020-09-10 DEVICE — SUPRAPUBIC MID-URETHRAL SLING
Type: IMPLANTABLE DEVICE | Status: FUNCTIONAL
Brand: LYNX™ SYSTEM

## 2020-09-10 RX ORDER — DEXAMETHASONE SODIUM PHOSPHATE 4 MG/ML
INJECTION, SOLUTION INTRA-ARTICULAR; INTRALESIONAL; INTRAMUSCULAR; INTRAVENOUS; SOFT TISSUE PRN
Status: DISCONTINUED | OUTPATIENT
Start: 2020-09-10 | End: 2020-09-10 | Stop reason: SDUPTHER

## 2020-09-10 RX ORDER — DOCUSATE SODIUM 100 MG/1
100 CAPSULE, LIQUID FILLED ORAL 2 TIMES DAILY PRN
Qty: 60 CAPSULE | Refills: 1 | Status: SHIPPED | OUTPATIENT
Start: 2020-09-10 | End: 2022-01-11

## 2020-09-10 RX ORDER — LIDOCAINE HYDROCHLORIDE 10 MG/ML
INJECTION, SOLUTION EPIDURAL; INFILTRATION; INTRACAUDAL; PERINEURAL PRN
Status: DISCONTINUED | OUTPATIENT
Start: 2020-09-10 | End: 2020-09-10 | Stop reason: SDUPTHER

## 2020-09-10 RX ORDER — ROCURONIUM BROMIDE 10 MG/ML
INJECTION, SOLUTION INTRAVENOUS PRN
Status: DISCONTINUED | OUTPATIENT
Start: 2020-09-10 | End: 2020-09-10 | Stop reason: SDUPTHER

## 2020-09-10 RX ORDER — KETOROLAC TROMETHAMINE 30 MG/ML
INJECTION, SOLUTION INTRAMUSCULAR; INTRAVENOUS PRN
Status: DISCONTINUED | OUTPATIENT
Start: 2020-09-10 | End: 2020-09-10 | Stop reason: SDUPTHER

## 2020-09-10 RX ORDER — MORPHINE SULFATE 2 MG/ML
2 INJECTION, SOLUTION INTRAMUSCULAR; INTRAVENOUS EVERY 5 MIN PRN
Status: DISCONTINUED | OUTPATIENT
Start: 2020-09-10 | End: 2020-09-10 | Stop reason: HOSPADM

## 2020-09-10 RX ORDER — NEOSTIGMINE METHYLSULFATE 5 MG/5 ML
SYRINGE (ML) INTRAVENOUS PRN
Status: DISCONTINUED | OUTPATIENT
Start: 2020-09-10 | End: 2020-09-10 | Stop reason: SDUPTHER

## 2020-09-10 RX ORDER — DIPHENHYDRAMINE HYDROCHLORIDE 50 MG/ML
12.5 INJECTION INTRAMUSCULAR; INTRAVENOUS
Status: DISCONTINUED | OUTPATIENT
Start: 2020-09-10 | End: 2020-09-10 | Stop reason: HOSPADM

## 2020-09-10 RX ORDER — SCOLOPAMINE TRANSDERMAL SYSTEM 1 MG/1
1 PATCH, EXTENDED RELEASE TRANSDERMAL
Status: DISCONTINUED | OUTPATIENT
Start: 2020-09-10 | End: 2020-09-10 | Stop reason: HOSPADM

## 2020-09-10 RX ORDER — CEPHALEXIN 500 MG/1
500 CAPSULE ORAL 3 TIMES DAILY
Qty: 15 CAPSULE | Refills: 0 | Status: SHIPPED | OUTPATIENT
Start: 2020-09-10 | End: 2020-09-15

## 2020-09-10 RX ORDER — OXYCODONE HYDROCHLORIDE AND ACETAMINOPHEN 5; 325 MG/1; MG/1
2 TABLET ORAL PRN
Status: DISCONTINUED | OUTPATIENT
Start: 2020-09-10 | End: 2020-09-10 | Stop reason: HOSPADM

## 2020-09-10 RX ORDER — MIDAZOLAM HYDROCHLORIDE 1 MG/ML
INJECTION INTRAMUSCULAR; INTRAVENOUS PRN
Status: DISCONTINUED | OUTPATIENT
Start: 2020-09-10 | End: 2020-09-10 | Stop reason: SDUPTHER

## 2020-09-10 RX ORDER — DIPHENHYDRAMINE HYDROCHLORIDE 50 MG/ML
INJECTION INTRAMUSCULAR; INTRAVENOUS PRN
Status: DISCONTINUED | OUTPATIENT
Start: 2020-09-10 | End: 2020-09-10 | Stop reason: SDUPTHER

## 2020-09-10 RX ORDER — PROPOFOL 10 MG/ML
INJECTION, EMULSION INTRAVENOUS PRN
Status: DISCONTINUED | OUTPATIENT
Start: 2020-09-10 | End: 2020-09-10 | Stop reason: SDUPTHER

## 2020-09-10 RX ORDER — LABETALOL HYDROCHLORIDE 5 MG/ML
5 INJECTION, SOLUTION INTRAVENOUS EVERY 10 MIN PRN
Status: DISCONTINUED | OUTPATIENT
Start: 2020-09-10 | End: 2020-09-10 | Stop reason: HOSPADM

## 2020-09-10 RX ORDER — HYDROCODONE BITARTRATE AND ACETAMINOPHEN 5; 325 MG/1; MG/1
1 TABLET ORAL EVERY 6 HOURS PRN
Qty: 20 TABLET | Refills: 0 | Status: SHIPPED | OUTPATIENT
Start: 2020-09-10 | End: 2020-09-15

## 2020-09-10 RX ORDER — SODIUM CHLORIDE, SODIUM LACTATE, POTASSIUM CHLORIDE, CALCIUM CHLORIDE 600; 310; 30; 20 MG/100ML; MG/100ML; MG/100ML; MG/100ML
1000 INJECTION, SOLUTION INTRAVENOUS CONTINUOUS
Status: DISCONTINUED | OUTPATIENT
Start: 2020-09-10 | End: 2020-09-10 | Stop reason: HOSPADM

## 2020-09-10 RX ORDER — ONDANSETRON 2 MG/ML
4 INJECTION INTRAMUSCULAR; INTRAVENOUS
Status: DISCONTINUED | OUTPATIENT
Start: 2020-09-10 | End: 2020-09-10 | Stop reason: HOSPADM

## 2020-09-10 RX ORDER — IBUPROFEN 800 MG/1
800 TABLET ORAL EVERY 8 HOURS PRN
Qty: 30 TABLET | Refills: 0 | Status: SHIPPED | OUTPATIENT
Start: 2020-09-10 | End: 2022-07-28 | Stop reason: ALTCHOICE

## 2020-09-10 RX ORDER — ONDANSETRON 2 MG/ML
INJECTION INTRAMUSCULAR; INTRAVENOUS PRN
Status: DISCONTINUED | OUTPATIENT
Start: 2020-09-10 | End: 2020-09-10 | Stop reason: SDUPTHER

## 2020-09-10 RX ORDER — MAGNESIUM HYDROXIDE 1200 MG/15ML
LIQUID ORAL CONTINUOUS PRN
Status: COMPLETED | OUTPATIENT
Start: 2020-09-10 | End: 2020-09-10

## 2020-09-10 RX ORDER — FENTANYL CITRATE 50 UG/ML
INJECTION, SOLUTION INTRAMUSCULAR; INTRAVENOUS PRN
Status: DISCONTINUED | OUTPATIENT
Start: 2020-09-10 | End: 2020-09-10 | Stop reason: SDUPTHER

## 2020-09-10 RX ORDER — LIDOCAINE HYDROCHLORIDE AND EPINEPHRINE 10; 10 MG/ML; UG/ML
INJECTION, SOLUTION INFILTRATION; PERINEURAL PRN
Status: DISCONTINUED | OUTPATIENT
Start: 2020-09-10 | End: 2020-09-10 | Stop reason: ALTCHOICE

## 2020-09-10 RX ORDER — ONDANSETRON 4 MG/1
4 TABLET, ORALLY DISINTEGRATING ORAL EVERY 8 HOURS PRN
Qty: 15 TABLET | Refills: 0 | Status: SHIPPED | OUTPATIENT
Start: 2020-09-10 | End: 2020-09-15

## 2020-09-10 RX ORDER — GLYCOPYRROLATE 1 MG/5 ML
SYRINGE (ML) INTRAVENOUS PRN
Status: DISCONTINUED | OUTPATIENT
Start: 2020-09-10 | End: 2020-09-10 | Stop reason: SDUPTHER

## 2020-09-10 RX ORDER — CEFAZOLIN SODIUM 2 G/50ML
SOLUTION INTRAVENOUS PRN
Status: DISCONTINUED | OUTPATIENT
Start: 2020-09-10 | End: 2020-09-10 | Stop reason: SDUPTHER

## 2020-09-10 RX ORDER — PHENYLEPHRINE HYDROCHLORIDE 10 MG/ML
INJECTION INTRAVENOUS PRN
Status: DISCONTINUED | OUTPATIENT
Start: 2020-09-10 | End: 2020-09-10 | Stop reason: SDUPTHER

## 2020-09-10 RX ORDER — FENTANYL CITRATE 50 UG/ML
25 INJECTION, SOLUTION INTRAMUSCULAR; INTRAVENOUS EVERY 5 MIN PRN
Status: DISCONTINUED | OUTPATIENT
Start: 2020-09-10 | End: 2020-09-10 | Stop reason: HOSPADM

## 2020-09-10 RX ORDER — OXYCODONE HYDROCHLORIDE AND ACETAMINOPHEN 5; 325 MG/1; MG/1
1 TABLET ORAL PRN
Status: DISCONTINUED | OUTPATIENT
Start: 2020-09-10 | End: 2020-09-10 | Stop reason: HOSPADM

## 2020-09-10 RX ADMIN — FENTANYL CITRATE 50 MCG: 50 INJECTION INTRAMUSCULAR; INTRAVENOUS at 07:53

## 2020-09-10 RX ADMIN — KETOROLAC TROMETHAMINE 30 MG: 30 INJECTION, SOLUTION INTRAMUSCULAR; INTRAVENOUS at 09:17

## 2020-09-10 RX ADMIN — Medication 2 MG: at 09:20

## 2020-09-10 RX ADMIN — PROPOFOL 180 MG: 10 INJECTION, EMULSION INTRAVENOUS at 07:53

## 2020-09-10 RX ADMIN — FENTANYL CITRATE 50 MCG: 50 INJECTION INTRAMUSCULAR; INTRAVENOUS at 07:46

## 2020-09-10 RX ADMIN — Medication 20 MG: at 08:07

## 2020-09-10 RX ADMIN — MIDAZOLAM HYDROCHLORIDE 1 MG: 1 INJECTION, SOLUTION INTRAMUSCULAR; INTRAVENOUS at 07:46

## 2020-09-10 RX ADMIN — PHENYLEPHRINE HYDROCHLORIDE 100 MCG: 10 INJECTION INTRAVENOUS at 08:05

## 2020-09-10 RX ADMIN — ROCURONIUM BROMIDE 50 MG: 10 INJECTION INTRAVENOUS at 07:53

## 2020-09-10 RX ADMIN — PHENYLEPHRINE HYDROCHLORIDE 100 MCG: 10 INJECTION INTRAVENOUS at 08:03

## 2020-09-10 RX ADMIN — SODIUM CHLORIDE, POTASSIUM CHLORIDE, SODIUM LACTATE AND CALCIUM CHLORIDE 1000 ML: 600; 310; 30; 20 INJECTION, SOLUTION INTRAVENOUS at 07:36

## 2020-09-10 RX ADMIN — LIDOCAINE HYDROCHLORIDE 50 MG: 10 INJECTION, SOLUTION EPIDURAL; INFILTRATION; INTRACAUDAL; PERINEURAL at 07:53

## 2020-09-10 RX ADMIN — ONDANSETRON 4 MG: 2 INJECTION, SOLUTION INTRAMUSCULAR; INTRAVENOUS at 08:07

## 2020-09-10 RX ADMIN — DEXAMETHASONE SODIUM PHOSPHATE 8 MG: 4 INJECTION, SOLUTION INTRAMUSCULAR; INTRAVENOUS at 08:07

## 2020-09-10 RX ADMIN — CEFAZOLIN SODIUM 2 G: 2 SOLUTION INTRAVENOUS at 07:58

## 2020-09-10 RX ADMIN — Medication 0.4 MG: at 09:20

## 2020-09-10 RX ADMIN — SODIUM CHLORIDE, POTASSIUM CHLORIDE, SODIUM LACTATE AND CALCIUM CHLORIDE: 600; 310; 30; 20 INJECTION, SOLUTION INTRAVENOUS at 09:21

## 2020-09-10 RX ADMIN — SODIUM CHLORIDE, POTASSIUM CHLORIDE, SODIUM LACTATE AND CALCIUM CHLORIDE: 600; 310; 30; 20 INJECTION, SOLUTION INTRAVENOUS at 07:43

## 2020-09-10 RX ADMIN — MIDAZOLAM HYDROCHLORIDE 1 MG: 1 INJECTION, SOLUTION INTRAMUSCULAR; INTRAVENOUS at 07:53

## 2020-09-10 ASSESSMENT — PAIN SCALES - GENERAL
PAINLEVEL_OUTOF10: 0

## 2020-09-10 ASSESSMENT — PULMONARY FUNCTION TESTS
PIF_VALUE: 17
PIF_VALUE: 17
PIF_VALUE: 18
PIF_VALUE: 16
PIF_VALUE: 18
PIF_VALUE: 16
PIF_VALUE: 16
PIF_VALUE: 17
PIF_VALUE: 1
PIF_VALUE: 16
PIF_VALUE: 25
PIF_VALUE: 18
PIF_VALUE: 16
PIF_VALUE: 18
PIF_VALUE: 24
PIF_VALUE: 17
PIF_VALUE: 18
PIF_VALUE: 15
PIF_VALUE: 18
PIF_VALUE: 17
PIF_VALUE: 17
PIF_VALUE: 16
PIF_VALUE: 17
PIF_VALUE: 18
PIF_VALUE: 16
PIF_VALUE: 16
PIF_VALUE: 18
PIF_VALUE: 26
PIF_VALUE: 18
PIF_VALUE: 17
PIF_VALUE: 18
PIF_VALUE: 16
PIF_VALUE: 18
PIF_VALUE: 15
PIF_VALUE: 18
PIF_VALUE: 17
PIF_VALUE: 1
PIF_VALUE: 17
PIF_VALUE: 18
PIF_VALUE: 19
PIF_VALUE: 18
PIF_VALUE: 17
PIF_VALUE: 2
PIF_VALUE: 18
PIF_VALUE: 16
PIF_VALUE: 16
PIF_VALUE: 18
PIF_VALUE: 26
PIF_VALUE: 17
PIF_VALUE: 16
PIF_VALUE: 18
PIF_VALUE: 18
PIF_VALUE: 2
PIF_VALUE: 6
PIF_VALUE: 15
PIF_VALUE: 16
PIF_VALUE: 16
PIF_VALUE: 14
PIF_VALUE: 16
PIF_VALUE: 17
PIF_VALUE: 18
PIF_VALUE: 18
PIF_VALUE: 15
PIF_VALUE: 17
PIF_VALUE: 1
PIF_VALUE: 15
PIF_VALUE: 17
PIF_VALUE: 15
PIF_VALUE: 18
PIF_VALUE: 18
PIF_VALUE: 17
PIF_VALUE: 2
PIF_VALUE: 18
PIF_VALUE: 17
PIF_VALUE: 17
PIF_VALUE: 24
PIF_VALUE: 15
PIF_VALUE: 17
PIF_VALUE: 19
PIF_VALUE: 18
PIF_VALUE: 16
PIF_VALUE: 3
PIF_VALUE: 16
PIF_VALUE: 16
PIF_VALUE: 18
PIF_VALUE: 18
PIF_VALUE: 16
PIF_VALUE: 1
PIF_VALUE: 18
PIF_VALUE: 17
PIF_VALUE: 17
PIF_VALUE: 16
PIF_VALUE: 16
PIF_VALUE: 17
PIF_VALUE: 17
PIF_VALUE: 15
PIF_VALUE: 32
PIF_VALUE: 16
PIF_VALUE: 17
PIF_VALUE: 18
PIF_VALUE: 17
PIF_VALUE: 17
PIF_VALUE: 16
PIF_VALUE: 17
PIF_VALUE: 1
PIF_VALUE: 17
PIF_VALUE: 17

## 2020-09-10 ASSESSMENT — LIFESTYLE VARIABLES: SMOKING_STATUS: 0

## 2020-09-10 ASSESSMENT — PAIN - FUNCTIONAL ASSESSMENT: PAIN_FUNCTIONAL_ASSESSMENT: 0-10

## 2020-09-10 NOTE — OP NOTE
Operative Note  Department of Obstetrics and Gynecology  [facility]       Patient: Iker Torres   : 1955  MRN: [id number]       Acct: [de-identified]   PCP: ALANA Larios NP  Date of Procedure: 9/10/20    Pre-operative Diagnosis: 72 y.o. female    Posterior vaginal wall prolapse  Vaginal enterocele  Stress urinary incontinence  Hx of TVH (2017)     Post-operative Diagnosis: same    Procedure: posterior colporrhaphy (urethral sling placement per urology)     Surgeon: Ton Chao MD; Randi Duarte DO     Assistant(s): Bárbara Bearden, PGY3; Michela Koch, MS4     Anesthesia: general     Indications: Patient has history of stress incontinence and was referred to gynecologist for posterior vaginal prolapse. She has daily incontinence requiring pads. She also reports bulge in the vaginal that is painful at times. Obstetrical history is significant for 3 SVDs without history of operative delivery. She has hx of TVH in 2017 with anterior and posterior repair. Conservative vs surgical management was discussed with patient and she desires surgical management at this time. All R/B/A were discussed and questions/concerns addressed. Procedure Details: The patient was seen in the pre-op room. The risks, benefits, complications, treatment options, and expected outcomes were discussed with the patient. The patient concurred with the proposed plan, giving informed consent. The patient was taken to the Operating Room and identified as Iker Torres and the procedure was verified. A Time Out was held and the above information confirmed. After administration of general anesthesia, the patient was placed in the dorsolithotomy position with yellofin stirrups and examination under general anesthesia performed with findings as noted below. The patient was prepped and draped in the usual sterile fashion.      Two allis clamps were placed at the mucocutaneous junction and the fourchette was trimmed with Metzenbaum scissors. Utilizing an inverted \"V\" technique. Separate two allis clamps were placed on the resection edge and another at the apex. The vaginal mucosa was undermined with blunt dissection and Metzenbaum scissors with a closed entry and tips always up. The allis clamps migrated in the same fashion as each 2 cm area was undermined and opened until the apex of the rectocele was reached. Placement of multiple allis clamps on edge allowed for blunt dissection to release the vaginal mucosa from the underlying rectal tissue. 2-0 vicryl stitches in a box orientation were placed parallel through the lateral rectal pelvic fascia and tied plicating the lateral rectal pelvic fascia and obliterating the rectocele. The excess vaginal epithelial tissue was resected with Metzenbaum scissors. The resection site is closed with a 3-0 vicryl suture in a running locking technique. The skin edge is closed with 3-0 vicryl in a subcuticular technique. The caliber of the newly reconstructed vaginal vault was 2.5 fingers breath wide with excellent length and support. Instrument, sponge, and needle counts were correct at the conclusion of the case. SCDs for DVT prophylaxis remain in place for the post operative period. Dr. Amie Bentley (urology) then proceeded with urethral sling portion of case. Please see Dr. Cyndi Penaloza operative note for further details. Dr. Deirdre Hart was present for the entire operation. Findings:  Normal external genitalia without lesions. Vaginal mucosa significant for posterior vaginal wall prolapse. Cervix is surgically absent. Uterus surgically absent. No adnexal fullness bilaterally.   Total IV fluids/Blood products:  Pending urology procedure  Urine Output:  Drained prior to procedure  Estimated blood loss:  10mL  Drains:  None at end of posterior colporrhaphy  Specimens:  none  Instrument and Sponge Count: Correct  Complications:  none  Condition:  stable, procedure continued by urology    Natmanjula Degree, DO  Ob/Gyn Resident  9/10/2020, 10:41 AM

## 2020-09-10 NOTE — ANESTHESIA PRE PROCEDURE
No data recorded  BP Readings from Last 3 Encounters:   09/03/20 (!) 158/78   08/28/20 128/78   08/19/20 120/80       BMI  There is no height or weight on file to calculate BMI. CBC   Lab Results   Component Value Date    WBC 5.3 08/28/2020    RBC 4.78 08/28/2020    HGB 14.0 08/28/2020    HCT 43.0 08/28/2020    MCV 90.0 08/28/2020    RDW 12.8 08/28/2020     08/28/2020       CMP    Lab Results   Component Value Date     08/28/2020    K 3.8 08/28/2020     08/28/2020    CO2 29 08/28/2020    BUN 12 08/28/2020    CREATININE 0.59 08/28/2020    GFRAA >60 08/28/2020    LABGLOM >60 08/28/2020    GLUCOSE 97 08/28/2020       BMP    Lab Results   Component Value Date     08/28/2020    K 3.8 08/28/2020     08/28/2020    CO2 29 08/28/2020    BUN 12 08/28/2020    CREATININE 0.59 08/28/2020    GFRAA >60 08/28/2020    LABGLOM >60 08/28/2020    GLUCOSE 97 08/28/2020       POC Testing  No results for input(s): POCGLU, POCNA, POCK, POCCL, POCBUN, POCHEMO, POCHCT in the last 72 hours. Coags  No results found for: PROTIME, INR, APTT    HCG (If Applicable) No results found for: PREGTESTUR, PREGSERUM, HCG, HCGQUANT     ABGs No results found for: PHART, PO2ART, JTH2WNH, KOD2GTE, BEART, L6COETNK     Type & Screen (If Applicable)  No results found for: LABABO, [address]    Radiology (If Applicable)    Cardiac Testing (If Applicable) neg stress    EKG (If Applicable) inf infarct          Medications prior to admission:   Prior to Admission medications    Medication Sig Start Date End Date Taking?  Authorizing Provider   vitamin D 25 MCG (1000 UT) CAPS Take by mouth    Historical Provider, MD   GLUCOSAMINE-CHONDROITIN ER PO Take by mouth daily    Historical Provider, MD   Ascorbic Acid (VITAMIN C) 1000 MG tablet Take 1,000 mg by mouth daily    Historical Provider, MD   Multiple Vitamins-Minerals (MULTIVITAMIN WOMEN 50+ PO) Take by mouth    Historical Provider, MD   omeprazole (PRILOSEC) 20 MG delayed release capsule Take 1 capsule by mouth daily  Patient taking differently: Take 20 mg by mouth daily otc 3/18/20 3/18/21  ALANA Garza NP   atorvastatin (LIPITOR) 20 MG tablet Take 1 tablet by mouth daily  Patient taking differently: Take 20 mg by mouth daily Per pcp 3/18/20 3/18/21  ALANA Garza NP       Current medications:    No current facility-administered medications for this encounter. Allergies: Allergies   Allergen Reactions    Bactrim Other (See Comments)     Palpitations      Ciprofloxacin     Nitrofurantoin     Sulfa Antibiotics     Zocor [Simvastatin] Other (See Comments)     Muscle cramps       Problem List:    Patient Active Problem List   Diagnosis Code    Posterior vaginal wall prolapse N81.6    Stress incontinence N39.3       Past Medical History:        Diagnosis Date    Arthritis     lower back and knee    Elevated cholesterol     on rx per pcp    Esophageal stricture     GERD (gastroesophageal reflux disease)     otc prilosec    Snores     denies apnea    Wears prescription eyeglasses     Wellness examination     Kerwin WARNER  last seen 8/19/2020       Past Surgical History:        Procedure Laterality Date    COLONOSCOPY      2019    DILATION AND CURETTAGE      ENDOSCOPY, COLON, DIAGNOSTIC      2019 stricture w/ dilitation . [facility]      left big toe hemangiomaremoved, benign    HYSTERECTOMY  02/2007    TVH withBSO , A&P repair    HYSTEROSCOPY  03/2007    novasure    LAPAROSCOPY  03/2007    BTL       Social History:    Social History     Tobacco Use    Smoking status: Never Smoker    Smokeless tobacco: Never Used   Substance Use Topics    Alcohol use: No                                Counseling given: Not Answered      Vital Signs (Current): There were no vitals filed for this visit.                                            BP Readings from Last 3 Encounters:   09/03/20 (!) 158/78   08/28/20 128/78   08/19/20 120/80 NPO Status:  MN                                                                               BMI:   Wt Readings from Last 3 Encounters:   08/28/20 162 lb 1.3 oz (73.5 kg)   08/19/20 163 lb (73.9 kg)   07/20/20 164 lb (74.4 kg)     There is no height or weight on file to calculate BMI.    CBC:   Lab Results   Component Value Date    WBC 5.3 08/28/2020    RBC 4.78 08/28/2020    HGB 14.0 08/28/2020    HCT 43.0 08/28/2020    MCV 90.0 08/28/2020    RDW 12.8 08/28/2020     08/28/2020       CMP:   Lab Results   Component Value Date     08/28/2020    K 3.8 08/28/2020     08/28/2020    CO2 29 08/28/2020    BUN 12 08/28/2020    CREATININE 0.59 08/28/2020    GFRAA >60 08/28/2020    LABGLOM >60 08/28/2020    GLUCOSE 97 08/28/2020       POC Tests: No results for input(s): POCGLU, POCNA, POCK, POCCL, POCBUN, POCHEMO, POCHCT in the last 72 hours. Coags: No results found for: PROTIME, INR, APTT    HCG (If Applicable): No results found for: PREGTESTUR, PREGSERUM, HCG, HCGQUANT     ABGs: No results found for: PHART, PO2ART, JVW7JKE, JQO7GML, BEART, X4KGFOXB     Type & Screen (If Applicable):  No results found for: LABABO, LABRH    Drug/Infectious Status (If Applicable):  No results found for: HIV, HEPCAB    COVID-19 Screening (If Applicable):   Lab Results   Component Value Date    COVID19 Not Detected 09/10/2020         Anesthesia Evaluation   no history of anesthetic complications:   Airway: Mallampati: II     Neck ROM: full   Dental:          Pulmonary:       (-) asthma, recent URI and not a current smoker                           Cardiovascular:  Exercise tolerance: good (>4 METS),                     Neuro/Psych:      (-) seizures           GI/Hepatic/Renal:   (+) GERD: well controlled,          ROS comment: Hx esophageal stricture.    Endo/Other:    (+) : arthritis:., .                 Abdominal:           Vascular:                                      Anesthesia Plan      general     ASA 1     (Asa

## 2020-09-10 NOTE — OP NOTE
Dr. Christian Yu MD  Urologic Surgery      [address] [place]  09/10/20    Patient:  Britton Neff  MRN: [id number]  YOB: 1955    Surgeon: Dr. Christian Yu MD  Assistant: Dr Sergey House PGY-5    Pre-op Diagnosis: Stress Urinary Incontinence  Post-op Diagnosis: Same    Procedure:   Cystoscopy, Placement of Lynx Suprapubic Mid-Urethral Sling    Anesthesia: General  Complications: None  OR Blood Loss: Minimal  Fluids: Cystalloids  Specimens: None    Indication:  Patient is a pleasant 59-year-old female with stress urinary incontinence and rectocele. She underwent a rectocele repair with Dr. Temitope Nielsen earlier today. She now presents for our portion of the procedure. She was evaluated preoperatively in our clinic and was demonstrated to have stress incontinence. Risks and benefits of various sling procedures were discussed with her as were Keagle exercises. After all risks and benefits were explained she elected to proceed with a mesh-based transvaginal tape procedure. Narrative of the Procedure:    After informed consent was obtained in the preoperative area, the patient was taken back to the operating room and transferred to the operating table in supine position. EPC cuffs were placed. The machine was turned on. Anesthesia was induced and antibiotics were given. The patient was placed in a exaggerated dorsal lithotomy position. She was sterilely prepped and draped in a standard fashion. A timeout occurred. Two patient identifiers were used. The bladder was drained with a 16F aguilar that was placed on the field. A weighted vaginal speculum was then placed. Two stab incisions were created roughly 2 finger breaths above the pubic bone and roughly 3 cm lateral to midline. 1% lidocaine with epinephrine  was then injected in the per-urethral tissues to hydro-dissect them.  A 15 blade scalpel was used to make a 2cm incision longitudinal over the mid-urethral. Scissors were used to incise the vaginal tissues and create pockets lateral to the urethra. Both trocars were then placed. Inspection of the vaginal sulci bilaterally demonstrated proper passage of the trocars without evidence of vaginal perforation. The aguilar was removed and cystoscopy with a 70 degree lens demonstrated no evidence of bladder or urethral perforation. The aguilar was replaced after the cystoscope was removed. The sling was then deployed and positioned over the mid-urethra in a tension-free manner. The stab incisions were closed with Dermabond after the sling had been trimmed to size. The vaginal incision was irrigated with gentamicin containing solution. The vaginal mucosa was then closed with a running 2-0 Vicryl suture. The patient was awakened, extubated, discharged back to the PACU. Follow-Up: The patient will undergo a voiding trial in the PACU.        Danelle Singh  Electronically signed on 6/29/2017 at 10:01 AM

## 2020-09-10 NOTE — ANESTHESIA POSTPROCEDURE EVALUATION
Department of Anesthesiology  Postprocedure Note    Patient: Brianna Mcconnell  MRN: [id number]  [place]: 1955  Date of evaluation: 9/10/2020  Time:  1:12 PM     Procedure Summary     Date:  09/10/20 Room / Location:  [address]    Anesthesia Start:  4818 Anesthesia Stop:  9087    Procedures:       POSTERIOR COLPORRHAPHY (N/A )      LYNX MID URETHRAL SLING INSERTION (N/A ) Diagnosis:  (POSTERIOR VAGINAL WALL PROLAPSE, VAGINAL ENTEROCELE)    Surgeon:  Agatha Jeter MD; Gabriel Calloway MD Responsible Provider:  Laura Simmons MD    Anesthesia Type:  general ASA Status:  1          Anesthesia Type: general    David Phase I: David Score: 10    David Phase II: David Score: 10    Last vitals: Reviewed and per EMR flowsheets.        Anesthesia Post Evaluation    Patient location during evaluation: PACU  Patient participation: complete - patient participated  Level of consciousness: awake and alert  Pain score: 0  Nausea & Vomiting: no nausea  Cardiovascular status: hemodynamically stable  Respiratory status: room air English

## 2020-09-10 NOTE — H&P
that includes Foot surgery; laparoscopy (03/2007); Dilation & curettage; hysteroscopy (03/2007); Hysterectomy (02/2007); Colonoscopy; and Endoscopy, colon, diagnostic. ALLERGIES:  Allergies as of 07/23/2020 - Review Complete 07/20/2020   Allergen Reaction Noted    Bactrim Other (See Comments) 10/15/2012    Ciprofloxacin  03/18/2020    Nitrofurantoin  03/18/2020    Sulfa antibiotics  03/18/2020    Zocor [simvastatin] Other (See Comments) 10/15/2012       MEDICATIONS:  Current Facility-Administered Medications   Medication Dose Route Frequency Provider Last Rate Last Dose    lactated ringers infusion 1,000 mL  1,000 mL Intravenous Continuous Brittny Chew MD        scopolamine (TRANSDERM-SCOP) transdermal patch 1 patch  1 patch Transdermal Q72H Jillian Hanson MD           FAMILY HISTORY:  family history includes Cancer in her father and mother; Heart Disease in her father, maternal grandmother, and mother; Stroke in her paternal grandmother. SOCIAL HISTORY:   reports that she has never smoked. She has never used smokeless tobacco. She reports that she does not drink alcohol or use drugs.     VITALS:  Vitals:    09/10/20 0701 09/10/20 0717   BP:  (!) 142/82   Pulse:  63   Resp:  16   Temp:  97 °F (36.1 °C)   TempSrc:  Temporal   SpO2:  98%   Weight: 160 lb 7.9 oz (72.8 kg)    Height: 5' 7\" (1.702 m)                                                                                                                                PHYSICAL EXAM:     Unchanged from Prior H&P  CONSTITUTIONAL:  Alert and oriented, no acute distress  HEAD: normocephalic, atraumatic  EYES: Pupils equal and reactive to light, Extraocular muscles intact, sclera non icteric  ENT: Mucus membranes moist, No otorrhea, no rhinorrhea  NECK:  supple, symmetrical, trachea midline   LUNGS:  Good air movement bilaterally, unlabored respirations, no wheezes or rhonchi  CARDIOVASCULAR: Regular rate and rhythm, no murmurs rubs or gallops  ABDOMEN: soft, non tender, non distended, no rebound or guarding, no hernias, no hepatomegaly, no splenomegly  MUSCULOSKELETAL:  Equal strength bilaterally, normal muscle tone  SKIN: No abscess or rash  NEUROLOGIC:  Cranial nerves 2-12 grossly intact, no focal deficits  PSYCH: affect appropriate  Pelvic Exam: deferred to OR      LAB RESULTS:  Admission on 09/10/2020   Component Date Value Ref Range Status    SARS-CoV-2 09/10/2020        Final    SARS-CoV-2, Rapid 09/10/2020 Not Detected  Not Detected Final    Comment:       Rapid NAAT:  The specimen is NEGATIVE for SARS-CoV-2, the novel coronavirus associated with   COVID-19. The ID NOW COVID-19 assay is designed to detect the virus that causes COVID-19 in patients   with signs and symptoms of infection who are suspected of COVID-19. An individual without symptoms of COVID-19 and who is not shedding SARS-CoV-2 virus would   expect to have a negative (not detected) result in this assay. Negative results should be treated as presumptive and, if inconsistent with clinical signs   and symptoms or necessary for patient management,  should be tested with an alternative molecular assay. Negative results do not preclude   SARS-CoV-2 infection and   should not be used as the sole basis for patient management decisions. Fact sheet for Healthcare Providers: Rashmi.es  Fact sheet for Patients: Rashmi.es          Methodology: Isothermal Nucleic Acid Amplification      Source 09/10/2020 . NASOPHARYNGEAL SWAB   Final    SARS-CoV-2 09/10/2020        Final   Hospital Outpatient Visit on 08/28/2020   Component Date Value Ref Range Status    WBC 08/28/2020 5.3  3.5 - 11.3 k/uL Final    RBC 08/28/2020 4.78  3.95 - 5.11 m/uL Final    Hemoglobin 08/28/2020 14.0  11.9 - 15.1 g/dL Final    Hematocrit 08/28/2020 43.0  36.3 - 47.1 % Final    MCV 08/28/2020 90.0  82.6 - 102.9 fL Final   [facility] ([place]) Stress-induced perfusion abnormalities encumbering greater than or equal to 10% myocardium or stress segmental scores indicating multiple vascular territories with abnormalities 4. Stress-induced LV dilatation (TID ratio greater than 1.19 for exercise and greater than 1.39 for regadenoson) Intermediate risk (1% to 3% annual death or MI) 1. Mild/moderate resting LV dysfunction (LVEF 35% to 49%) not readily explained by non coronary causes. 2. Resting perfusion abnormalities in 5%-9.9% of the myocardium in patients without a history or prior evidence of MI 3. Stress-induced perfusion abnormality encumbering 5%-9.9% of the myocardium or stress segmental scores indicating 1 vascular territory with abnormalities but without LV dilation 4. Small wall motion abnormality involving 1-2 segments and only 1 coronary bed. Low Risk (Less than 1% annual death or MI) 1. Normal or small myocardial perfusion defect at rest or with stress encumbering less than 5% of the myocardium. Normal study. Risk stratification: Low       DIAGNOSIS & PLAN:  1. Posterior vaginal wall prolapase, vaginal enterocele   - Proceed with planned procedure: posterior colporrhaphy    - Consent signed, on chart. - The patient is ready for transport to the operative suite. 2. Stress urinary incontinence   - Urethral sling placement per urology    Counseling: The patient was counseled on all options both medical and surgical, conservative as well as definitive. She has elected to proceed with the procedure as stated above. The patient was counseled on the procedure. Risks and complications were reviewed in detail. The patients orders, labs, consents have been completed. The history and physical as well as all supporting surgical documentation will be forwarded to the pre-operative holding area. The patient is aware that this procedure may not alleviate her symptoms.  That there may be a necessity for a second surgery and that there may be an incomplete removal of abnormal tissue.     Saji Simon DO  Ob/Gyn Resident  PGY3  [address] [facility]  9/10/2020, 7:09 AM

## 2020-09-10 NOTE — H&P
[facility], [address] [place] [address], Meagan Montenegro, & [place]   Urology H&P      Patient:  Luke Bobby  MRN: [id number]  YOB: 1955    CHIEF COMPLAINT:  Stress urinary incontinence, pelvic organ prolapse    HISTORY OF PRESENT ILLNESS:   The patient is a 72 y.o. female who presents with above. Here for mid urethral sling and cysto in combination with posterior colporrhaphy by Dr. Michelle Sow. Patient's old records, notes and chart reviewed and summarized above. Past Medical History:    Past Medical History:   Diagnosis Date    Arthritis     lower back and knee    Elevated cholesterol     on rx per pcp    Esophageal stricture     GERD (gastroesophageal reflux disease)     otc prilosec    Snores     denies apnea    Wears prescription eyeglasses     Wellness examination     Alice Andrea ALANA  last seen 8/19/2020       Past Surgical History:    Past Surgical History:   Procedure Laterality Date    COLONOSCOPY      2019    DILATION AND CURETTAGE      ENDOSCOPY, COLON, DIAGNOSTIC      2019 stricture w/ dilitation . [facility] FACILITY FOOT SURGERY      left big toe hemangiomaremoved, benign    HYSTERECTOMY  02/2007    TVH withBSO , A&P repair    HYSTEROSCOPY  03/2007    novasure    LAPAROSCOPY  03/2007    BTL       Medications:      Current Facility-Administered Medications:     lactated ringers infusion 1,000 mL, 1,000 mL, Intravenous, Continuous, Fawad Ochoa MD    scopolamine (TRANSDERM-SCOP) transdermal patch 1 patch, 1 patch, Transdermal, Q72H, Jhonny Yoon MD    Allergies: Allergies   Allergen Reactions    Bactrim Other (See Comments)     Palpitations      Ciprofloxacin     Nitrofurantoin     Sulfa Antibiotics     Zocor [Simvastatin] Other (See Comments)     Muscle cramps       Social History:   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   Social Needs    Financial resource strain: Not hard at all   [employer] insecurity     Worry: Never true     Inability: Never true   [employer] needs     Medical: No     Non-medical: No   Tobacco Use    Smoking status: Never Smoker    Smokeless tobacco: Never Used   Substance and Sexual Activity    Alcohol use: No    Drug use: No    Sexual activity: Not Currently   Lifestyle    Physical activity     Days per week: 0 days     Minutes per session: 0 min    Stress: Only a little   Relationships    Social connections     Talks on phone: More than three times a week     Gets together: Three times a week     Attends Yazidism service: 1 to 4 times per year     Active member of club or organization: No     Attends meetings of clubs or organizations: Never     Relationship status:     Intimate partner violence     Fear of current or ex partner: Patient refused     Emotionally abused: Patient refused     Physically abused: Patient refused     Forced sexual activity: Patient refused   Other Topics Concern    Not on file   Social History Narrative    Not on file       Family History:    Family History   Problem Relation Age of Onset    Heart Disease Maternal Grandmother         CHF    Stroke Paternal Grandmother     Heart Disease Mother     Cancer Mother     Heart Disease Father     Cancer Father        REVIEW OF SYSTEMS:  A comprehensive 14 point review of systems was obtained. Constitutional: No fatigue  Eyes: No blurry vision  Ears, nose, mouth, throat, face: No ringing in the ears; no facial droop. Respiratory: No cough or cold. Cardiovascular: No palpitations  Gastrointestinal: No diarrhea or constipation. Genitourinary: No burning with urination  Integument/Skin: No rashes  Hematologic/Lymphatic: No easy bruising  Musculoskeletal: No muscle pains  Neurologic: No weakness in the extremities. Psychiatric: No depression or suicidal thoughts. Endocrine: No heat or cold intolerances. Allergic/Immunologic: No current seasonal allergies; no skin hives. Physical Exam:      This a 72 y.o. female   There were no vitals filed for this visit. Constitutional: Patient in no acute distress. Neuro: alert and oriented to person place and time. Head: Atraumatic and normocephalic. Neck: Trachea midline. Ext: 2+ radial pulses bilaterally. Psych: Mood and affect normal.  Skin: No rashes or bruising present. Band aids on bilateral forearms (poison Ivy)  Lungs: Respiratory effort normal.  Cardiovascular:  Regular rhythm. Abdomen: Soft, non-tender, non-distended. Labs:  No results for input(s): WBC, HGB, HCT, MCV, PLT in the last 72 hours. No results for input(s): NA, K, CL, CO2, PHOS, BUN, CREATININE in the last 72 hours. Invalid input(s): CA    No results for input(s): COLORU, PHUR, LABCAST, WBCUA, RBCUA, MUCUS, TRICHOMONAS, YEAST, BACTERIA, CLARITYU, SPECGRAV, LEUKOCYTESUR, UROBILINOGEN, Claire Gentle in the last 72 hours. Invalid input(s): NITRATE, GLUCOSEUKETONESUAMORPHOUS        -----------------------------------------------------------------  Imaging Results:  No results found.     Assessment and Plan   Impression:    Patient Active Problem List   Diagnosis    Posterior vaginal wall prolapse    Stress incontinence         Plan: to OR for cysto/mid urethral sling  Consent signed, questions answered        Alec Fung  7:01 AM 9/10/2020

## 2020-09-10 NOTE — PROGRESS NOTES
Dr. Chapo Smith at bedside to speak with patient regarding his recommendation for aguilar insertion.   Patient will still be able to go home with aguilar and follow up on Monday for aguilar removal.

## 2020-09-10 NOTE — PROGRESS NOTES
Dr. Junior Hazel at bedside to speak with patient. He would like a bladder scan to make sure patient is emptying. Will call with results.

## 2020-09-10 NOTE — PROGRESS NOTES
Patient up to use restroom. Patient was able to urinate 100 ml bright red, hazy urine after about 10 minutes. Patient continues to leak urine. Patient ambulated back to room. Bladder scan obtained with 413 ml result. Dr. Cesar Ruano notified of results. Per Dr. Cesar Ruano he will come by and speak with patient.

## 2020-09-14 ENCOUNTER — OFFICE VISIT (OUTPATIENT)
Dept: FAMILY MEDICINE CLINIC | Age: 65
End: 2020-09-14
Payer: COMMERCIAL

## 2020-09-14 VITALS
HEART RATE: 72 BPM | TEMPERATURE: 97.8 F | BODY MASS INDEX: 25.74 KG/M2 | WEIGHT: 164 LBS | DIASTOLIC BLOOD PRESSURE: 80 MMHG | OXYGEN SATURATION: 94 % | SYSTOLIC BLOOD PRESSURE: 132 MMHG | HEIGHT: 67 IN

## 2020-09-14 PROCEDURE — 96372 THER/PROPH/DIAG INJ SC/IM: CPT | Performed by: NURSE PRACTITIONER

## 2020-09-14 PROCEDURE — 99214 OFFICE O/P EST MOD 30 MIN: CPT | Performed by: NURSE PRACTITIONER

## 2020-09-14 RX ORDER — SKIN CLEANSER COMBINATION NO.8
1 CLEANSER (GRAM) TOPICAL 2 TIMES DAILY
Qty: 1 TUBE | Refills: 0 | Status: SHIPPED | OUTPATIENT
Start: 2020-09-14 | End: 2020-10-21 | Stop reason: ALTCHOICE

## 2020-09-14 RX ORDER — DIAPER,BRIEF,INFANT-TODD,DISP
1 EACH MISCELLANEOUS 2 TIMES DAILY
Qty: 1 TUBE | Refills: 1 | Status: SHIPPED | OUTPATIENT
Start: 2020-09-14 | End: 2022-01-11

## 2020-09-14 RX ORDER — PREDNISONE 20 MG/1
20 TABLET ORAL DAILY
Qty: 6 TABLET | Refills: 1 | Status: SHIPPED | OUTPATIENT
Start: 2020-09-15 | End: 2020-09-18

## 2020-09-14 RX ORDER — METHYLPREDNISOLONE ACETATE 40 MG/ML
60 INJECTION, SUSPENSION INTRA-ARTICULAR; INTRALESIONAL; INTRAMUSCULAR; SOFT TISSUE ONCE
Status: COMPLETED | OUTPATIENT
Start: 2020-09-14 | End: 2020-09-14

## 2020-09-14 RX ADMIN — METHYLPREDNISOLONE ACETATE 60 MG: 40 INJECTION, SUSPENSION INTRA-ARTICULAR; INTRALESIONAL; INTRAMUSCULAR; SOFT TISSUE at 13:20

## 2020-09-14 NOTE — PROGRESS NOTES
[id number]  [phone]    Date of Visit:  2020  Patient :  1955   CHIEF COMPLAINT:     Felisa Vergara is a 72 y.o. female who presents today for an acute visit to be evaluated for the following condition:  Chief Complaint   Patient presents with   [facility]     all over body x 4 day has gotten worse        REVIEW OF SYSTEM      Review of Systems   Constitutional: Negative for activity change, fatigue and unexpected weight change. HENT: Negative for congestion, ear pain, hearing loss, rhinorrhea and sore throat. Eyes: Negative for pain. Respiratory: Negative for cough and shortness of breath. Cardiovascular: Negative for chest pain, palpitations and leg swelling. Gastrointestinal: Negative for constipation and diarrhea. Musculoskeletal: Negative for arthralgias, gait problem and joint pain. Skin: Positive for rash (systemic abdomen, bilate arms. ). Neurological: Negative for dizziness, weakness and headaches. Psychiatric/Behavioral: Negative for confusion. The patient is not nervous/anxious. HISTORY OF PRESENT ILLNESS     Patient recently had surgery for bladder sling and rectal prolapse surgery - she is currently on Keflex post operative     SKIN RASH/WOUND/INFECTION    Patient presents for evaluation of poison ivy. Symptoms are located on upper arm, forearm, abdomen, back and face. Onset of symptoms was  1 week(s) ago. Patient has associated symptoms of redness and itching. Patient does not have symptoms of pain and nausea/vomiting. Patient admits exposure to plants. Treatment prior to arrival include antibiotic, benadryl and Technu have shown no improvement to condition. Patient was in contact with plants about a week ago and developed rash on arms. Patient states she had surgery Thursday for prolapsed rectum and bladder sling and the rash started a couple days prior.  Describes rash as itchy and has spread to multiple locations on arms, abdomen, right side of face, and right eyelid. Patient tried technu poison ivy cream with worsening symptoms, patient found cream had sulfa listed as an ingredient in which she is allergic to. Rash   This is a new problem. The current episode started in the past 7 days. The problem has been gradually worsening since onset. The affected locations include the right eye, right arm, left arm, face and abdomen. The rash is characterized by itchiness, peeling and redness. She was exposed to plant contact. Associated symptoms include facial edema. Pertinent negatives include no congestion, cough, diarrhea, eye pain, fatigue, joint pain, rhinorrhea, shortness of breath or sore throat. Treatments tried: tried technu poison Kat cream and worsened rash. The treatment provided no relief. REVIEWED INFORMATION      Allergies   Allergen Reactions    Bactrim Other (See Comments)     Palpitations      Ciprofloxacin     Nitrofurantoin     Sulfa Antibiotics     Zocor [Simvastatin] Other (See Comments)     Muscle cramps       Patient Active Problem List   Diagnosis    Rectocele    Stress incontinence    Posterior colporrhaphy 9/10/2020    Post-op pain       Past Medical History:   Diagnosis Date    Arthritis     lower back and knee    Elevated cholesterol     on rx per pcp    Esophageal stricture     GERD (gastroesophageal reflux disease)     otc prilosec    Posterior vaginal wall prolapse     Snores     denies apnea    Vaginal enterocele     Wears prescription eyeglasses     Wellness examination     Debbie WARNER  last seen 8/19/2020       PHYSICAL EXAM   ASS  Vitals:    09/14/20 1146   BP: 132/80   Site: Left Upper Arm   Position: Sitting   Cuff Size: Medium Adult   Pulse: 72   Temp: 97.8 °F (36.6 °C)   SpO2: 94%   Weight: 164 lb (74.4 kg)   Height: 5' 7\" (1.702 m)     Physical Exam  Vitals signs reviewed. Constitutional:       Appearance: Normal appearance.  She is not [phone]    Electronically signed by MARIA FERNANDA Costello on 9/20/2020 at 1:33 PM

## 2020-09-15 RX ORDER — ATORVASTATIN CALCIUM 20 MG/1
20 TABLET, FILM COATED ORAL DAILY
Qty: 30 TABLET | Refills: 5 | Status: SHIPPED | OUTPATIENT
Start: 2020-09-15 | End: 2020-10-13 | Stop reason: SDUPTHER

## 2020-09-16 ASSESSMENT — ENCOUNTER SYMPTOMS
DIARRHEA: 0
SHORTNESS OF BREATH: 0
RHINORRHEA: 0
COUGH: 0
SORE THROAT: 0
CONSTIPATION: 0
EYE PAIN: 0

## 2020-09-20 NOTE — PATIENT INSTRUCTIONS
It was my pleasure to meet with you today. Please contact me with any questions or concerns, and please notify myself or our manger if there is anyway we can improve our service in your health care needs. Below I have listed some instructions and information that pertain to today's visit. Start medication as directed  Rest, fluids, and OTC symptomatic treatments recommended.    Tylenol and Ibuprofen for fever and general discomfort  Monitor for worsening symptoms  Follow up as directed, return to clinic or emergent services with new or worsening symptoms

## 2020-09-29 ENCOUNTER — OFFICE VISIT (OUTPATIENT)
Dept: OBGYN CLINIC | Age: 65
End: 2020-09-29

## 2020-09-29 VITALS
SYSTOLIC BLOOD PRESSURE: 133 MMHG | TEMPERATURE: 97 F | DIASTOLIC BLOOD PRESSURE: 78 MMHG | BODY MASS INDEX: 26.16 KG/M2 | HEART RATE: 64 BPM | WEIGHT: 167 LBS

## 2020-09-29 PROCEDURE — 99024 POSTOP FOLLOW-UP VISIT: CPT | Performed by: OBSTETRICS & GYNECOLOGY

## 2020-09-29 RX ORDER — CEFDINIR 300 MG/1
300 CAPSULE ORAL 2 TIMES DAILY
COMMUNITY
End: 2021-07-20

## 2020-09-29 NOTE — PROGRESS NOTES
[facility] PHYSICIANS  [facility] Bautista Barnett  [address] [facility] [address]   Dept: [phone]    Ngozi Ernandez  2020  10:02 AM      Ngozi Ernandez  Procedure: Posterior colporrhaphy (urethral sling placement per urology)      Ngozi Ernandez is a 72 y.o. female       The patient was seen, she denies any complaints. She denied any shortness of breath, chest pain or dizziness. She denied any nausea, vomiting, or diarrhea. There is no fever, chills, or rigors. The patient denies any vaginal bleeding, discharge or odor. All of her pre-operative complaints are now resolved. Blood pressure 133/78, pulse 64, temperature 97 °F (36.1 °C), weight 167 lb (75.8 kg), not currently breastfeeding. Abdominal Exam: soft non-tender. Good bowel sounds. No guarding, rebound or rigidity. No costal vertebral angle tenderness bilateral. No hernias    Extremities: No edema or calf pain noted bilaterally. Pelvic Exam:  Exam deferred. Results for orders placed or performed during the hospital encounter of 09/10/20   COVID-19    Specimen: Other   Result Value Ref Range    SARS-CoV-2          SARS-CoV-2, Rapid Not Detected Not Detected    Source . NASOPHARYNGEAL SWAB     SARS-CoV-2                 Assessment:      Diagnosis Orders   1. Postop check     2. Posterior vaginal wall prolapse          Patient Active Problem List    Diagnosis Date Noted    Posterior colporrhaphy 9/10/2020 09/10/2020    Post-op pain     Rectocele 2020    Stress incontinence 2020          POD# 19   Procedure: posterior colporrhaphy   Stable   Pathology reviewed and found to be benign. Yes    Plan:   Return in about 4 weeks (around 10/27/2020) for postop Op. Continue with restrictions. Pelvic rest. No lifting or intercourse. No baths or pools. No douching or tampons.     Louis Mitchell MD

## 2020-10-13 RX ORDER — ATORVASTATIN CALCIUM 20 MG/1
20 TABLET, FILM COATED ORAL DAILY
Qty: 30 TABLET | Refills: 5 | Status: SHIPPED | OUTPATIENT
Start: 2020-10-13 | End: 2021-04-09 | Stop reason: SDUPTHER

## 2020-10-13 NOTE — TELEPHONE ENCOUNTER
Pt would like a refill on the pending medication.      Last OV: 9/14  Next OV: 11/18    Pt contact: [phone]

## 2020-10-20 NOTE — PROGRESS NOTES
[facility] PHYSICIANS  [facility] [place] William  [address] [facility] [address]  Dept: [phone]    Bentley Aguirre  10/21/2020  10:11 AM      Dot Antunez  Procedure: Posterior colporrhaphy with sling by urology      Bentley Aguirre is a 72 y.o. female       The patient was seen, she denies any complaints. She denied any shortness of breath, chest pain or dizziness. She denied any nausea, vomiting, or diarrhea. There is no fever, chills, or rigors. The patient denies any vaginal bleeding, discharge or odor. All of her pre-operative complaints are now resolved. She did have a UTI and has been feeling better after taking antibiotics. She says her bladder leaking is improving as well. Blood pressure 124/81, pulse 70, weight 167 lb (75.8 kg), not currently breastfeeding. Abdominal Exam: soft non-tender. Good bowel sounds. No guarding, rebound or rigidity. No costal vertebral angle tenderness bilateral. No hernias    Extremities: No edema or calf pain noted bilaterally. Pelvic Exam: Normal appearing vulva. Vagina healing well without any defects in the posterior wall. Results for orders placed or performed during the hospital encounter of 09/10/20   COVID-19    Specimen: Other   Result Value Ref Range    SARS-CoV-2          SARS-CoV-2, Rapid Not Detected Not Detected    Source . NASOPHARYNGEAL SWAB     SARS-CoV-2                 Assessment:      Diagnosis Orders   1. Posterior vaginal wall prolapse          Patient Active Problem List    Diagnosis Date Noted    Posterior colporrhaphy 9/10/2020 09/10/2020    Post-op pain     Rectocele 2020    Stress incontinence 2020          POD# 6 wks   Procedure: Posterior Colporrhaphy   Stable   Pathology reviewed and found to be benign. Yes    Plan:   Return if symptoms worsen or fail to improve.   Medical release to work given    Jose Frost MD 3

## 2020-10-21 ENCOUNTER — OFFICE VISIT (OUTPATIENT)
Dept: OBGYN CLINIC | Age: 65
End: 2020-10-21

## 2020-10-21 VITALS
DIASTOLIC BLOOD PRESSURE: 81 MMHG | BODY MASS INDEX: 26.16 KG/M2 | SYSTOLIC BLOOD PRESSURE: 124 MMHG | HEART RATE: 70 BPM | WEIGHT: 167 LBS

## 2020-10-21 PROCEDURE — 99024 POSTOP FOLLOW-UP VISIT: CPT | Performed by: OBSTETRICS & GYNECOLOGY

## 2020-10-21 NOTE — LETTER
[facility]  [address] [place] [address] [address] [facility] [address]  Phone: [phone]  Fax: [phone]    William Scales MD        October 21, 2020     Patient: Neto Boyce   YOB: 1955   Date of Visit: 10/21/2020       To Whom It May Concern: It is my medical opinion that Rajesh Elizabeth may return to full duty immediately with no restrictions. She underwent a posterior vaginal wall repair and a midurethral sling placement. If you have any questions or concerns, please don't hesitate to call.     Sincerely,        William Scales MD

## 2020-10-24 ENCOUNTER — NURSE TRIAGE (OUTPATIENT)
Dept: OTHER | Age: 65
End: 2020-10-24

## 2020-10-25 NOTE — TELEPHONE ENCOUNTER
[facility] ED dx her with COVID. She went there because she has diarrhea she thought from her antibiotics she is taking for UTI. This is the second antibiotic she has taken. She has taken this one for 3 or 4 days. No other COVID symptoms. Her antibiotics were prescribed by Dr. Hayder Hernandez a Urologist that did surgery for her. Writer recommended she call the urologist office, Dr. Hayder Hernandez.

## 2020-10-25 NOTE — TELEPHONE ENCOUNTER
Reason for Disposition   [1] Caller has URGENT medication question about med that PCP or specialist prescribed AND [2] triager unable to answer question    Answer Assessment - Initial Assessment Questions  1. NAME of MEDICATION: \"What medicine are you calling about? \"      Antibiotic    2. QUESTION: Rani Gabjailene is your question? \"      Should the medication be restarted    3. PRESCRIBING HCP: \"Who prescribed it? \" Reason: if prescribed by specialist, call should be referred to that group. Dr. [facility]    4. SYMPTOMS: \"Do you have any symptoms? \"      Diarrhea    5. SEVERITY: If symptoms are present, ask \"Are they mild, moderate or severe? \"      severe  6. PREGNANCY:  \"Is there any chance that you are pregnant? \" \"When was your last menstrual period? \"      *No Answer*    Protocols used: MEDICATION QUESTION CALL-ADULT-

## 2020-10-26 ENCOUNTER — TELEPHONE (OUTPATIENT)
Dept: FAMILY MEDICINE CLINIC | Age: 65
End: 2020-10-26

## 2020-10-26 NOTE — TELEPHONE ENCOUNTER
It is my understanding based on CDC and local health dept guidelines, positive persons must self-quarantine for ten days from date of positive test and can then return to normal activities such as work. Providing the patient is fever free for at least 24 hours without any temperature lowering medications such as tylenol or Motrin.

## 2020-10-26 NOTE — LETTER
[facility]  [address] [facility] [address]  Phone: [phone]  Fax: [phone]    ALANA Kasper NP        October 29, 2020     Patient: Rose Willingham   YOB: 1955   Date of Visit: 10/26/2020       To Whom it May Concern:    Giovanny Archer is a patient under my care. She may return to work on 11/03/2020 with out any restrictions. If you have any questions or concerns, please don't hesitate to call.     Sincerely,         ALANA Kasper NP

## 2020-10-28 NOTE — TELEPHONE ENCOUNTER
[id number] Caroline Lam for return to work letter. Please find out date of positive test and confirm she is symptom/fever free.

## 2021-02-22 ENCOUNTER — OFFICE VISIT (OUTPATIENT)
Dept: FAMILY MEDICINE CLINIC | Age: 66
End: 2021-02-22
Payer: COMMERCIAL

## 2021-02-22 VITALS
RESPIRATION RATE: 12 BRPM | HEIGHT: 67 IN | TEMPERATURE: 98 F | WEIGHT: 164 LBS | BODY MASS INDEX: 25.74 KG/M2 | HEART RATE: 69 BPM | OXYGEN SATURATION: 97 % | DIASTOLIC BLOOD PRESSURE: 70 MMHG | SYSTOLIC BLOOD PRESSURE: 117 MMHG

## 2021-02-22 DIAGNOSIS — E78.5 HYPERLIPIDEMIA, UNSPECIFIED HYPERLIPIDEMIA TYPE: ICD-10-CM

## 2021-02-22 DIAGNOSIS — Z12.11 SCREEN FOR COLON CANCER: ICD-10-CM

## 2021-02-22 DIAGNOSIS — Z00.00 ROUTINE PHYSICAL EXAMINATION: Primary | ICD-10-CM

## 2021-02-22 DIAGNOSIS — K21.9 GASTROESOPHAGEAL REFLUX DISEASE WITHOUT ESOPHAGITIS: ICD-10-CM

## 2021-02-22 DIAGNOSIS — Z13.29 THYROID DISORDER SCREEN: ICD-10-CM

## 2021-02-22 PROCEDURE — 99397 PER PM REEVAL EST PAT 65+ YR: CPT | Performed by: NURSE PRACTITIONER

## 2021-02-22 ASSESSMENT — PATIENT HEALTH QUESTIONNAIRE - PHQ9
SUM OF ALL RESPONSES TO PHQ9 QUESTIONS 1 & 2: 0
2. FEELING DOWN, DEPRESSED OR HOPELESS: 0
1. LITTLE INTEREST OR PLEASURE IN DOING THINGS: 0

## 2021-02-22 NOTE — PROGRESS NOTES
Tatiana Kwan (:  1955) is a 72 y.o. female,Established patient, here for evaluation of the following chief complaint(s): Annual Exam      ASSESSMENT/PLAN:  1. Routine physical examination  -     CBC; Future  -     Comprehensive Metabolic Panel; Future  -     Hemoglobin A1C; Future  -     Vitamin D 25 Hydroxy; Future  -     Iron And TIBC; Future  -     Ferritin; Future  -     Vitamin B12 & Folate; Future  2. Hyperlipidemia, unspecified hyperlipidemia type  Assessment & Plan:  Stable  Continue current medication    Orders:  -     Lipid Panel; Future  3. Gastroesophageal reflux disease without esophagitis  Assessment & Plan:  Stable  Continue current medication    4. Screen for colon cancer  -     Cologuard (For External Results Only); Future  5. Thyroid disorder screen  -     TSH; Future  -     T4, Free; Future      Return in about 6 months (around 2021) for medication follow up, routine follow up. SUBJECTIVE/OBJECTIVE:  Presents to office today for routine physical and follow up related to GERD, hyperlipidemia. S/p surgery by Dr. Marlena Yap in 2020. Reports some complications following surgery but is improving. She reports is doing well. Does have continued urinary incontinence and frequent UTI's. Is following with urology-Dr. Fabiana Young. Next follow up in march. Was advised to start a medication to help with incontinence but was hesitant. I did encourage her to try it as I do agree it may help improve incontinence issues. Reports a good appetite, drinking fluids. Normal bowel pattern. Sleeping ok. Denies depression and anxiety concerns. Due to complete mammogram. Due for fasting labs. Agreeable to cologuard. No additional concerns today. Review of Systems   Constitutional: Negative for activity change, appetite change, fatigue and fever. HENT: Negative for congestion, rhinorrhea and sore throat. Regular dental visits   Eyes: Negative for visual disturbance.         Wears glasses   Respiratory: Negative for apnea, cough, chest tightness, shortness of breath and wheezing. Cardiovascular: Negative for chest pain and palpitations. Gastrointestinal: Negative for abdominal distention, abdominal pain, constipation, diarrhea, nausea and vomiting. History of GERD  History of esophageal stricture-stretched 2x   Endocrine: Negative for polydipsia, polyphagia and polyuria. Genitourinary: Positive for urgency. Negative for decreased urine volume, difficulty urinating, dysuria and frequency. Frequent UTI's, continued incontinence  Follows with urology-Dr. Nell Vaughn   Musculoskeletal: Negative for arthralgias. Skin: Negative for color change. Allergic/Immunologic: Positive for environmental allergies. Negative for immunocompromised state. Bactrim  Cipro  Sulfa  Nitrofurantoin  Zocor   Neurological: Negative for syncope, weakness, light-headedness and headaches. Psychiatric/Behavioral: Negative for decreased concentration, dysphoric mood, sleep disturbance and suicidal ideas. The patient is not nervous/anxious. Physical Exam  Vitals signs and nursing note reviewed. Constitutional:       General: She is not in acute distress. Appearance: Normal appearance. She is well-developed, well-groomed and normal weight. She is not ill-appearing. HENT:      Head: Normocephalic and atraumatic. Right Ear: External ear normal.      Left Ear: External ear normal.      Nose: Nose normal. No congestion. Mouth/Throat:      Lips: Pink. Mouth: Mucous membranes are moist.      Pharynx: Oropharynx is clear. Uvula midline. Eyes:      Conjunctiva/sclera: Conjunctivae normal.      Pupils: Pupils are equal, round, and reactive to light. Neck:      Musculoskeletal: Normal range of motion. Thyroid: No thyroid mass. Trachea: Trachea normal.   Cardiovascular:      Rate and Rhythm: Normal rate and regular rhythm. Pulses: Normal pulses.            Carotid pulses are 2+ on the right side and 2+ on the left side. Radial pulses are 2+ on the right side and 2+ on the left side. Dorsalis pedis pulses are 2+ on the right side and 2+ on the left side. Posterior tibial pulses are 2+ on the right side and 2+ on the left side. Heart sounds: Normal heart sounds, S1 normal and S2 normal. No murmur. Pulmonary:      Effort: Pulmonary effort is normal. No respiratory distress. Breath sounds: Normal breath sounds. No decreased air movement. No decreased breath sounds or wheezing. Abdominal:      General: Bowel sounds are normal. There is no distension. Palpations: Abdomen is soft. Tenderness: There is no abdominal tenderness. Genitourinary:     Exam position: Supine. Musculoskeletal:      Right lower leg: No edema. Left lower leg: No edema. Lymphadenopathy:      Cervical: No cervical adenopathy. Skin:     General: Skin is warm and dry. Capillary Refill: Capillary refill takes less than 2 seconds. Coloration: Skin is not pale. Findings: No rash. Neurological:      Mental Status: She is alert and oriented to person, place, and time. GCS: GCS eye subscore is 4. GCS verbal subscore is 5. GCS motor subscore is 6. Motor: Motor function is intact. Coordination: Coordination is intact. Psychiatric:         Attention and Perception: Attention normal.         Mood and Affect: Mood normal.         Speech: Speech normal.         Behavior: Behavior normal. Behavior is cooperative. Thought Content: Thought content normal.         Cognition and Memory: Cognition normal.         Judgment: Judgment normal.                 An electronic signature was used to authenticate this note.     --ALANA Vargas NP

## 2021-02-25 PROBLEM — E78.5 HYPERLIPIDEMIA: Status: ACTIVE | Noted: 2021-02-25

## 2021-02-25 PROBLEM — K21.9 GASTROESOPHAGEAL REFLUX DISEASE WITHOUT ESOPHAGITIS: Status: ACTIVE | Noted: 2021-02-25

## 2021-02-25 ASSESSMENT — ENCOUNTER SYMPTOMS
COLOR CHANGE: 0
RHINORRHEA: 0
NAUSEA: 0
CONSTIPATION: 0
COUGH: 0
APNEA: 0
ABDOMINAL DISTENTION: 0
SORE THROAT: 0
WHEEZING: 0
ABDOMINAL PAIN: 0
SHORTNESS OF BREATH: 0
DIARRHEA: 0
CHEST TIGHTNESS: 0
VOMITING: 0

## 2021-03-19 ENCOUNTER — HOSPITAL ENCOUNTER (OUTPATIENT)
Age: 66
Setting detail: SPECIMEN
Discharge: HOME | End: 2021-03-19
Payer: MEDICARE

## 2021-03-19 DIAGNOSIS — Z00.00 ROUTINE PHYSICAL EXAMINATION: ICD-10-CM

## 2021-03-19 DIAGNOSIS — E78.5 HYPERLIPIDEMIA, UNSPECIFIED HYPERLIPIDEMIA TYPE: ICD-10-CM

## 2021-03-19 DIAGNOSIS — Z13.29 THYROID DISORDER SCREEN: ICD-10-CM

## 2021-03-19 LAB
ALBUMIN SERPL-MCNC: 4.4 G/DL (ref 3.5–5.2)
ALBUMIN/GLOBULIN RATIO: 1.6 (ref 1–2.5)
ALP BLD-CCNC: 105 U/L (ref 35–104)
ALT SERPL-CCNC: 27 U/L (ref 5–33)
ANION GAP SERPL CALCULATED.3IONS-SCNC: 13 MMOL/L (ref 9–17)
AST SERPL-CCNC: 30 U/L
BILIRUB SERPL-MCNC: 0.72 MG/DL (ref 0.3–1.2)
BUN BLDV-MCNC: 10 MG/DL (ref 8–23)
BUN/CREAT BLD: ABNORMAL (ref 9–20)
CALCIUM SERPL-MCNC: 9.1 MG/DL (ref 8.6–10.4)
CHLORIDE BLD-SCNC: 104 MMOL/L (ref 98–107)
CHOLESTEROL/HDL RATIO: 3.4
CHOLESTEROL: 158 MG/DL
CO2: 26 MMOL/L (ref 20–31)
CREAT SERPL-MCNC: 0.59 MG/DL (ref 0.5–0.9)
ESTIMATED AVERAGE GLUCOSE: 131 MG/DL
FERRITIN: 149 UG/L (ref 13–150)
FOLATE: >20 NG/ML
GFR AFRICAN AMERICAN: >60 ML/MIN
GFR NON-AFRICAN AMERICAN: >60 ML/MIN
GFR SERPL CREATININE-BSD FRML MDRD: ABNORMAL ML/MIN/{1.73_M2}
GFR SERPL CREATININE-BSD FRML MDRD: ABNORMAL ML/MIN/{1.73_M2}
GLUCOSE BLD-MCNC: 90 MG/DL (ref 70–99)
HBA1C MFR BLD: 6.2 % (ref 4–6)
HCT VFR BLD CALC: 46.4 % (ref 36.3–47.1)
HDLC SERPL-MCNC: 47 MG/DL
HEMOGLOBIN: 14.4 G/DL (ref 11.9–15.1)
IRON SATURATION: 40 % (ref 20–55)
IRON: 100 UG/DL (ref 37–145)
LDL CHOLESTEROL: 91 MG/DL (ref 0–130)
MCH RBC QN AUTO: 28.6 PG (ref 25.2–33.5)
MCHC RBC AUTO-ENTMCNC: 31 G/DL (ref 28.4–34.8)
MCV RBC AUTO: 92.1 FL (ref 82.6–102.9)
NRBC AUTOMATED: 0 PER 100 WBC
PDW BLD-RTO: 12.9 % (ref 11.8–14.4)
PLATELET # BLD: NORMAL K/UL (ref 138–453)
PLATELET, FLUORESCENCE: 157 K/UL (ref 138–453)
PLATELET, IMMATURE FRACTION: 10.2 % (ref 1.1–10.3)
PMV BLD AUTO: NORMAL FL (ref 8.1–13.5)
POTASSIUM SERPL-SCNC: 4.2 MMOL/L (ref 3.7–5.3)
RBC # BLD: 5.04 M/UL (ref 3.95–5.11)
SODIUM BLD-SCNC: 143 MMOL/L (ref 135–144)
THYROXINE, FREE: 1.15 NG/DL (ref 0.93–1.7)
TOTAL IRON BINDING CAPACITY: 249 UG/DL (ref 250–450)
TOTAL PROTEIN: 7.1 G/DL (ref 6.4–8.3)
TRIGL SERPL-MCNC: 100 MG/DL
TSH SERPL DL<=0.05 MIU/L-ACNC: 1.14 MIU/L (ref 0.3–5)
UNSATURATED IRON BINDING CAPACITY: 149 UG/DL (ref 112–347)
VITAMIN B-12: 747 PG/ML (ref 232–1245)
VITAMIN D 25-HYDROXY: 48.6 NG/ML (ref 30–100)
VLDLC SERPL CALC-MCNC: NORMAL MG/DL (ref 1–30)
WBC # BLD: 5.7 K/UL (ref 3.5–11.3)

## 2021-03-24 DIAGNOSIS — R73.03 PRE-DIABETES: Primary | ICD-10-CM

## 2021-04-09 DIAGNOSIS — E78.5 HYPERLIPIDEMIA, UNSPECIFIED HYPERLIPIDEMIA TYPE: ICD-10-CM

## 2021-04-09 RX ORDER — ATORVASTATIN CALCIUM 20 MG/1
20 TABLET, FILM COATED ORAL DAILY
Qty: 30 TABLET | Refills: 5 | Status: SHIPPED | OUTPATIENT
Start: 2021-04-09 | End: 2021-10-08 | Stop reason: SDUPTHER

## 2021-04-09 NOTE — TELEPHONE ENCOUNTER
Last visit: 2/22/21  Last Med refill: 10/13/20    Next Visit Date:  Future Appointments   Date Time Provider Catrachito Lees   8/23/2021  2:00 PM ALANA Gibbons NP [address] Maintenance   Topic Date Due    Breast cancer screen  Never done    Colon cancer screen colonoscopy  Never done    DEXA (modify frequency per FRAX score)  08/19/2021 (Originally 5/13/2010)    Shingles Vaccine (1 of 2) 08/19/2021 (Originally 5/13/2005)    Hepatitis C screen  08/19/2021 (Originally 1955)    HIV screen  08/19/2021 (Originally 5/13/1970)    Pneumococcal 65+ years Vaccine (1 of 1 - PPSV23) 08/22/2021 (Originally 5/13/2020)    COVID-19 Vaccine (1) 08/22/2021 (Originally 5/13/1971)    DTaP/Tdap/Td vaccine (1 - Tdap) 02/22/2022 (Originally 5/13/1974)    Cervical cancer screen  02/22/2022 (Originally 5/13/1976)    Annual Wellness Visit (AWV)  02/22/2022 (Originally 8/28/2020)    Flu vaccine (Season Ended) 02/22/2022 (Originally 9/1/2021)    A1C test (Diabetic or Prediabetic)  03/19/2022    Lipid screen  03/19/2022    Hepatitis A vaccine  Aged Out    Hepatitis B vaccine  Aged Out    Hib vaccine  Aged Out    Meningococcal (ACWY) vaccine  Aged Out       Hemoglobin A1C (%)   Date Value   03/19/2021 6.2 (H)             ( goal A1C is < 7)   No results found for: LABMICR  LDL Cholesterol (mg/dL)   Date Value   03/19/2021 91       (goal LDL is <100)   AST (U/L)   Date Value   03/19/2021 30     ALT (U/L)   Date Value   03/19/2021 27     BUN (mg/dL)   Date Value   03/19/2021 10     BP Readings from Last 3 Encounters:   02/22/21 117/70   10/21/20 124/81   09/29/20 133/78          (goal 120/80)    All Future Testing planned in [facility]  Lab Frequency Next Occurrence   TWIN DIGITAL SCREEN W OR WO CAD BILATERAL Once 08/19/2021   Cologuard (For External Results Only) Once 02/22/2021   Hemoglobin A1C Once 09/24/2021               Patient Active Problem List:     Rectocele     Stress incontinence Posterior colporrhaphy 9/10/2020     Post-op pain     Hyperlipidemia     Gastroesophageal reflux disease without esophagitis

## 2021-04-09 NOTE — TELEPHONE ENCOUNTER
LOV: 2/22/2021  NOV: 08/23/2021      [employer] [facility] [address] [facility]

## 2021-04-27 ENCOUNTER — HOSPITAL ENCOUNTER (OUTPATIENT)
Age: 66
Setting detail: SPECIMEN
Discharge: HOME | End: 2021-04-27
Payer: MEDICARE

## 2021-04-29 LAB
CULTURE: ABNORMAL
Lab: ABNORMAL
SPECIMEN DESCRIPTION: ABNORMAL

## 2021-06-18 ENCOUNTER — HOSPITAL ENCOUNTER (OUTPATIENT)
Age: 66
Setting detail: SPECIMEN
Discharge: HOME | End: 2021-06-18
Payer: MEDICARE

## 2021-06-19 LAB
CULTURE: ABNORMAL
Lab: ABNORMAL
SPECIMEN DESCRIPTION: ABNORMAL

## 2021-07-08 ENCOUNTER — TELEPHONE (OUTPATIENT)
Dept: FAMILY MEDICINE CLINIC | Age: 66
End: 2021-07-08

## 2021-07-20 ENCOUNTER — OFFICE VISIT (OUTPATIENT)
Dept: PRIMARY CARE CLINIC | Age: 66
End: 2021-07-20
Payer: COMMERCIAL

## 2021-07-20 ENCOUNTER — NURSE TRIAGE (OUTPATIENT)
Dept: OTHER | Facility: CLINIC | Age: 66
End: 2021-07-20

## 2021-07-20 ENCOUNTER — HOSPITAL ENCOUNTER (OUTPATIENT)
Age: 66
Setting detail: SPECIMEN
Discharge: HOME | End: 2021-07-20
Payer: MEDICARE

## 2021-07-20 VITALS
BODY MASS INDEX: 25.84 KG/M2 | TEMPERATURE: 98 F | DIASTOLIC BLOOD PRESSURE: 88 MMHG | SYSTOLIC BLOOD PRESSURE: 120 MMHG | WEIGHT: 165 LBS | OXYGEN SATURATION: 98 % | HEART RATE: 66 BPM

## 2021-07-20 DIAGNOSIS — Z87.440 HISTORY OF UTI: ICD-10-CM

## 2021-07-20 DIAGNOSIS — N30.01 ACUTE CYSTITIS WITH HEMATURIA: Primary | ICD-10-CM

## 2021-07-20 DIAGNOSIS — R42 DIZZINESS: ICD-10-CM

## 2021-07-20 LAB
BILIRUBIN, POC: ABNORMAL
BLOOD URINE, POC: ABNORMAL
CLARITY, POC: ABNORMAL
COLOR, POC: YELLOW
GLUCOSE URINE, POC: ABNORMAL
KETONES, POC: ABNORMAL
LEUKOCYTE EST, POC: ABNORMAL
NITRITE, POC: POSITIVE
PH, POC: 5.5
PROTEIN, POC: ABNORMAL
SPECIFIC GRAVITY, POC: 1.03
UROBILINOGEN, POC: 0.2

## 2021-07-20 PROCEDURE — 81003 URINALYSIS AUTO W/O SCOPE: CPT | Performed by: PHYSICIAN ASSISTANT

## 2021-07-20 PROCEDURE — 99213 OFFICE O/P EST LOW 20 MIN: CPT | Performed by: PHYSICIAN ASSISTANT

## 2021-07-20 RX ORDER — CEPHALEXIN 500 MG/1
500 CAPSULE ORAL 2 TIMES DAILY
Qty: 14 CAPSULE | Refills: 0 | Status: SHIPPED | OUTPATIENT
Start: 2021-07-20 | End: 2021-07-27

## 2021-07-20 RX ORDER — PHENAZOPYRIDINE HYDROCHLORIDE 200 MG/1
200 TABLET, FILM COATED ORAL 3 TIMES DAILY PRN
Qty: 6 TABLET | Refills: 0 | Status: SHIPPED | OUTPATIENT
Start: 2021-07-20 | End: 2021-07-22

## 2021-07-20 RX ORDER — GREEN TEA/HOODIA GORDONII 315-12.5MG
1 CAPSULE ORAL DAILY
Qty: 30 TABLET | Refills: 0 | Status: SHIPPED | OUTPATIENT
Start: 2021-07-20 | End: 2021-08-19

## 2021-07-20 RX ORDER — OXYBUTYNIN CHLORIDE 5 MG/1
TABLET ORAL
COMMUNITY
Start: 2021-07-08 | End: 2022-01-11

## 2021-07-20 RX ORDER — CEPHALEXIN 500 MG/1
CAPSULE ORAL
COMMUNITY
Start: 2021-06-22 | End: 2021-07-20

## 2021-07-20 NOTE — TELEPHONE ENCOUNTER
C/o dizziness and lightheaded. Felt like she was going to pass out. Reason for Disposition   Feels like something inside is falling out of vagina (e.g., pressure, heaviness, fullness)   [1] MODERATE dizziness (e.g., vertigo; feels very unsteady, interferes with normal activities) AND [2] has NOT been evaluated by physician for this    Answer Assessment - Initial Assessment Questions  1. DESCRIPTION: \"Describe your dizziness. \"      States she works on the [employer] and a couple times she almost passed out, got lightheaded. Last September she had surgery and has been having trouble with UTI's since then. States she has noticed something has fallen in her vaginal area and can feel it. 2. VERTIGO: \"Do you feel like either you or the room is spinning or tilting? \"       Not spinning    3. LIGHTHEADED: \"Do you feel lightheaded? \" (e.g., somewhat faint, woozy, weak upon standing)      Yes    4. SEVERITY: \"How bad is it? \"  \"Can you walk? \"    - MILD - Feels unsteady but walking normally. - MODERATE - Feels very unsteady when walking, but not falling; interferes with normal activities (e.g., school, work) . - SEVERE - Unable to walk without falling (requires assistance). Moderate     5. ONSET:  \"When did the dizziness begin? \"      Off and on since she started taking medication for her bladder. Today she almost passed out. 6. AGGRAVATING FACTORS: \"Does anything make it worse? \" (e.g., standing, change in head position)      No    7. CAUSE: \"What do you think is causing the dizziness? \"      Medication for bladder, diabetic. 8. RECURRENT SYMPTOM: \"Have you had dizziness before? \" If so, ask: \"When was the last time? \" \"What happened that time? \"      Yes,     9. OTHER SYMPTOMS: \"Do you have any other symptoms? \" (e.g., headache, weakness, numbness, vomiting, earache)      Vaginal prolapse? 10. PREGNANCY: \"Is there any chance you are pregnant? \" \"When was your last menstrual period? \" N/a    Answer Assessment - Initial Assessment Questions  1. SYMPTOM: \"What's the main symptom you're concerned about? \" (e.g., pain, itching, dryness)     Something is sticking out of her vaginal area. 2. LOCATION: \"Where is the  *No Answer* located? \" (e.g., inside/outside, left/right)      Outside her vagina     3. ONSET: \"When did the  prolapse  start?\"      1 month     4. PAIN: \"Is there any pain? \" If so, ask: \"How bad is it? \" (Scale: 1-10; mild, moderate, severe)      No pain, burning     5. ITCHING: \"Is there any itching? \" If so, ask: \"How bad is it? \" (Scale: 1-10; mild, moderate, severe)      No     6. CAUSE: \"What do you think is causing the discharge? \" \"Have you had the same problem before? What happened then? \"      Yes, states she had surgery for this problem in the past.     7. OTHER SYMPTOMS: \"Do you have any other symptoms? \" (e.g., fever, itching, vaginal bleeding, pain with urination, injury to genital area, vaginal foreign body)      Burning with urination, urinary frequency . 8. PREGNANCY: \"Is there any chance you are pregnant? \" \"When was your last menstrual period? \"     N/a    Protocols used: VAGINAL SYMPTOMS-ADULT-AH, DIZZINESS - VERTIGO-ADULT-AH    Received call from [facility] at [facility] with [employer]. Brief description of triage: dizziness and vaginal prolapse. Triage indicates for patient to be seen within the next 24 hours and go to [facility] if unable to be seen. Care advice provided, patient verbalizes understanding; denies any other questions or concerns; instructed to call back for any new or worsening symptoms. Writer provided warm transfer to Angel Prince at [facility] for appointment scheduling. Attention Provider: Thank you for allowing me to participate in the care of your patient. The patient was connected to triage in response to information provided to the [facility].   Please do not respond through this encounter as the response is not directed to a shared

## 2021-07-20 NOTE — PROGRESS NOTES
[place] [address] [facility]  Phone: [phone]  Fax: [place]    Pt Name: Micah Mendez  MRN: [id number]  Marlen 1955  Date of evaluation: 7/20/2021  Provider: Juanito Jenkins Dr       Chief Complaint   Patient presents with    Dizziness     Pt states that it has been off and on for awhile. Was put on Oxybutinin for her bladder. She has almost passed out twice. Urintating a lot. HISTORY OF PRESENT ILLNESS  (Location/Symptom, Timing/Onset, Context/Setting, Quality, Duration, Modifying Factors, Severity.)   Micah Mendez is a 77 y.o. White (non-) [1] female who presents to the office for evaluation of      Urinary Frequency   This is a new problem. The current episode started 1 to 4 weeks ago. The problem has been gradually worsening. Associated symptoms include frequency and urgency. Pertinent negatives include no chills, discharge, flank pain, hematuria, hesitancy, nausea or possible pregnancy. Associated symptoms comments: dizziness. Nursing Notes were reviewed. REVIEW OF SYSTEMS    (2-9 systems for level 4, 10 or more for level 5)     Review of Systems   Constitutional: Negative for chills, diaphoresis, fatigue and fever. HENT: Negative. Respiratory: Negative. Cardiovascular: Negative. Gastrointestinal: Negative for nausea. Genitourinary: Positive for frequency and urgency. Negative for flank pain, hematuria and hesitancy. Musculoskeletal: Negative. Neurological: Positive for dizziness. Except as noted above the remainder of the review of systems was reviewed andnegative. PAST MEDICAL HISTORY   History reviewed.     Past Medical History:   Diagnosis Date    Arthritis     lower back and knee    Elevated cholesterol     on rx per pcp    Esophageal stricture     GERD (gastroesophageal reflux disease)     otc prilosec    Posterior vaginal wall prolapse  Snores     denies apnea    Vaginal enterocele     Wears prescription eyeglasses     Wellness examination     Saji WARNER  last seen 8/19/2020         SURGICAL HISTORY     History reviewed. Past Surgical History:   Procedure Laterality Date    BLADDER SUSPENSION  09/10/2020    LYNX MID URETHRAL SLING INSERTION     COLONOSCOPY      2019    COLPORRHAPHY  09/10/2020    posterior colporrhaphy    DILATION AND CURETTAGE      ENDOSCOPY, COLON, DIAGNOSTIC      2019 stricture w/ dilitation . [facility]      left big toe hemangiomaremoved, benign    HYSTERECTOMY  02/2007    TVH withBSO , A&P repair    HYSTEROSCOPY  03/2007    novasure    LAPAROSCOPY  03/2007    BTL    URETHRAL SURGERY N/A 9/10/2020    LYNX MID URETHRAL SLING INSERTION performed by Rosa Muir MD at [address] [facility] [address] N/A 9/10/2020    POSTERIOR COLPORRHAPHY performed by Clifford Maradiaga MD at [address] [facility]       Current Outpatient Medications   Medication Sig Dispense Refill    oxybutynin (DITROPAN) 5 MG tablet       Probiotic Acidophilus (FLORANEX) TABS Take 1 tablet by mouth daily 30 tablet 0    atorvastatin (LIPITOR) 20 MG tablet Take 1 tablet by mouth daily 30 tablet 5    ibuprofen (ADVIL;MOTRIN) 800 MG tablet Take 1 tablet by mouth every 8 hours as needed for Pain 30 tablet 0    docusate sodium (COLACE) 100 MG capsule Take 1 capsule by mouth 2 times daily as needed for Constipation 60 capsule 1    vitamin D 25 MCG (1000 UT) CAPS Take by mouth      GLUCOSAMINE-CHONDROITIN ER PO Take by mouth daily      Ascorbic Acid (VITAMIN C) 1000 MG tablet Take 1,000 mg by mouth daily      Multiple Vitamins-Minerals (MULTIVITAMIN WOMEN 50+ PO) Take by mouth      hydrocortisone 0.5 % cream Apply 1 Dose topically 2 times daily for 7 days Apply topically 2 times daily. 1 Tube 1     No current facility-administered medications for this visit.          ALLERGIES     Bactrim, Ciprofloxacin, Nitrofurantoin, Sulfa antibiotics, and Zocor [simvastatin]    FAMILY HISTORY           Problem Relation Age of Onset    Heart Disease Maternal Grandmother         CHF    Stroke Paternal Grandmother     Heart Disease Mother     Cancer Mother     Heart Disease Father     Cancer Father      Family Status   Relation Name Status    MGM      PGM  (Not Specified)    Mother      Father            SOCIAL HISTORY      reports that she has never smoked. She has never used smokeless tobacco. She reports that she does not drink alcohol and does not use drugs. PHYSICAL EXAM    (up to 7 for level 4, 8 or more for level 5)     Vitals:    21 1722   BP: 120/88   Site: Left Upper Arm   Position: Sitting   Cuff Size: Medium Adult   Pulse: 66   Temp: 98 °F (36.7 °C)   SpO2: 98%   Weight: 165 lb (74.8 kg)         Physical Exam  Vitals and nursing note reviewed. Constitutional:       General: She is not in acute distress. Appearance: Normal appearance. She is not ill-appearing. HENT:      Head: Normocephalic and atraumatic. Right Ear: External ear normal.      Left Ear: External ear normal.      Nose: Nose normal.   Eyes:      Extraocular Movements: Extraocular movements intact. Conjunctiva/sclera: Conjunctivae normal.      Pupils: Pupils are equal, round, and reactive to light. Pulmonary:      Effort: Pulmonary effort is normal.   Abdominal:      Palpations: Abdomen is soft. Skin:     General: Skin is warm and dry. Neurological:      Mental Status: She is alert and oriented to person, place, and time. DIFFERENTIAL DIAGNOSIS:         Josiah Silva reviewed the disposition diagnosis with the patient and or their family/guardian. I have answered their questions and given discharge instructions. They voiced understanding of these instructions and did not have anyfurther questions or complaints.       PROCEDURES:  Orders Placed This Encounter   Procedures  Culture, Urine     Order Specific Question:   Specify (ex-cath, midstream, cysto, etc)? Answer:   mid stream    POCT Urinalysis No Micro (Auto)       Results for orders placed or performed in visit on 07/20/21   POCT Urinalysis No Micro (Auto)   Result Value Ref Range    Color, UA yellow     Clarity, UA cloudy     Glucose, UA POC neg     Bilirubin, UA neg     Ketones, UA neg     Spec Grav, UA 1.030     Blood, UA POC MODERATE     pH, UA 5.5     Protein, UA POC 30MG     Urobilinogen, UA 0.2     Leukocytes, UA LARGE     Nitrite, UA POSITIVE        FINALIMPRESSION      Visit Diagnoses and Associated Orders     Acute cystitis with hematuria    -  Primary         Dizziness        POCT Urinalysis No Micro (Auto) [85760 Custom]      Culture, Urine [[id number] Custom]           History of UTI        POCT Urinalysis No Micro (Auto) [90831 Custom]      Culture, Urine [[id number] Custom]           ORDERS WITHOUT AN ASSOCIATED DIAGNOSIS    oxybutynin (DITROPAN) 5 MG tablet [5938]      cephALEXin (KEFLEX) 500 MG capsule [9500]      Probiotic Acidophilus (FLORANEX) TABS [287058]      phenazopyridine (PYRIDIUM) 200 MG tablet [6194]              PLAN     No follow-ups on file. DISCHARGEMEDICATIONS:  Orders Placed This Encounter   Medications    cephALEXin (KEFLEX) 500 MG capsule     Sig: Take 1 capsule by mouth 2 times daily for 7 days     Dispense:  14 capsule     Refill:  0    Probiotic Acidophilus (FLORANEX) TABS     Sig: Take 1 tablet by mouth daily     Dispense:  30 tablet     Refill:  0    phenazopyridine (PYRIDIUM) 200 MG tablet     Sig: Take 1 tablet by mouth 3 times daily as needed for Pain     Dispense:  6 tablet     Refill:  0         Plan:  Specimen sent for a culture. Possible treatment alteration based on the results. Patient instructed to complete entire antibiotic course. Increase fluid intake. Educational materials regarding UTI given on AVS.  Patient agreeable to treatment plan.   Follow up if symptoms do not improve/worsen. Patient instructed to return to the office if symptoms worsen, return, or have any other concerns. Patient understands and is agreeable.          Meghann Hutson PA-C 7/28/2021 4:12 PM

## 2021-07-20 NOTE — PATIENT INSTRUCTIONS
Patient Education        Urinary Tract Infection (UTI) in Women: Care Instructions  Overview     A urinary tract infection, or UTI, is a general term for an infection anywhere between the kidneys and the urethra (where urine comes out). Most UTIs are bladder infections. They often cause pain or burning when you urinate. UTIs are caused by bacteria and can be cured with antibiotics. Be sure to complete your treatment so that the infection does not get worse. Follow-up care is a key part of your treatment and safety. Be sure to make and go to all appointments, and call your doctor if you are having problems. It's also a good idea to know your test results and keep a list of the medicines you take. How can you care for yourself at home? · Take your antibiotics as directed. Do not stop taking them just because you feel better. You need to take the full course of antibiotics. · Drink extra water and other fluids for the next day or two. This will help make the urine less concentrated and help wash out the bacteria that are causing the infection. (If you have kidney, heart, or liver disease and have to limit fluids, talk with your doctor before you increase the amount of fluids you drink.)  · Avoid drinks that are carbonated or have caffeine. They can irritate the bladder. · Urinate often. Try to empty your bladder each time. · To relieve pain, take a hot bath or lay a heating pad set on low over your lower belly or genital area. Never go to sleep with a heating pad in place. To prevent UTIs  · Drink plenty of water each day. This helps you urinate often, which clears bacteria from your system. (If you have kidney, heart, or liver disease and have to limit fluids, talk with your doctor before you increase the amount of fluids you drink.)  · Urinate when you need to. · If you are sexually active, urinate right after you have sex. · Change sanitary pads often.   · Avoid douches, bubble baths, feminine hygiene sprays, and other feminine hygiene products that have deodorants. · After going to the bathroom, wipe from front to back. When should you call for help? Call your doctor now or seek immediate medical care if:    · Symptoms such as fever, chills, nausea, or vomiting get worse or appear for the first time.     · You have new pain in your back just below your rib cage. This is called flank pain.     · There is new blood or pus in your urine.     · You have any problems with your antibiotic medicine. Watch closely for changes in your health, and be sure to contact your doctor if:    · You are not getting better after taking an antibiotic for 2 days.     · Your symptoms go away but then come back. Where can you learn more? Go to https://[employer] Rock HoldingspeFuturlink.Consilium Software. org and sign in to your [employer] account. Enter M396 in the [employer] box to learn more about \"Urinary Tract Infection (UTI) in Women: Care Instructions. \"     If you do not have an account, please click on the \"Sign Up Now\" link. Current as of: February 10, 2021               Content Version: 12.9  © 5077-0563 [employer]. Care instructions adapted under license by [facility] ([facility]). If you have questions about a medical condition or this instruction, always ask your healthcare professional. [address] any warranty or liability for your use of this information. Patient Education        Dizziness: Care Instructions  Your Care Instructions  Dizziness is the feeling of unsteadiness or fuzziness in your head. It is different than having vertigo, which is a feeling that the room is spinning or that you are moving or falling. It is also different from lightheadedness, which is the feeling that you are about to faint. It can be hard to know what causes dizziness. Some people feel dizzy when they have migraine headaches. Sometimes bouts of flu can make you feel dizzy.  Some medical conditions, such as heart problems or high blood pressure, can make you feel dizzy. Many medicines can cause dizziness, including medicines for high blood pressure, pain, or anxiety. If a medicine causes your symptoms, your doctor may recommend that you stop or change the medicine. If it is a problem with your heart, you may need medicine to help your heart work better. If there is no clear reason for your symptoms, your doctor may suggest watching and waiting for a while to see if the dizziness goes away on its own. Follow-up care is a key part of your treatment and safety. Be sure to make and go to all appointments, and call your doctor if you are having problems. It's also a good idea to know your test results and keep a list of the medicines you take. How can you care for yourself at home? · If your doctor recommends or prescribes medicine, take it exactly as directed. Call your doctor if you think you are having a problem with your medicine. · Do not drive while you feel dizzy. · Try to prevent falls. Steps you can take include:  ? Using nonskid mats, adding grab bars near the tub, and using night-lights. ? Clearing your home so that walkways are free of anything you might trip on.  ? Letting family and friends know that you have been feeling dizzy. This will help them know how to help you. When should you call for help? Call 911 anytime you think you may need emergency care. For example, call if:    · You passed out (lost consciousness).     · You have dizziness along with symptoms of a heart attack. These may include:  ? Chest pain or pressure, or a strange feeling in the chest.  ? Sweating. ? Shortness of breath. ? Nausea or vomiting. ? Pain, pressure, or a strange feeling in the back, neck, jaw, or upper belly or in one or both shoulders or arms. ? Lightheadedness or sudden weakness. ? A fast or irregular heartbeat.     · You have symptoms of a stroke.  These may include:  ? Sudden numbness, tingling, weakness, or loss of movement in your face, arm, or leg, especially on only one side of your body. ? Sudden vision changes. ? Sudden trouble speaking. ? Sudden confusion or trouble understanding simple statements. ? Sudden problems with walking or balance. ? A sudden, severe headache that is different from past headaches. Call your doctor now or seek immediate medical care if:    · You feel dizzy and have a fever, headache, or ringing in your ears.     · You have new or increased nausea and vomiting.     · Your dizziness does not go away or comes back. Watch closely for changes in your health, and be sure to contact your doctor if:    · You do not get better as expected. Where can you learn more? Go to https://Enzymotec.Lootsie. org and sign in to your [employer] account. Enter G534 in the YourPlace box to learn more about \"Dizziness: Care Instructions. \"     If you do not have an account, please click on the \"Sign Up Now\" link. Current as of: October 19, 2020               Content Version: 12.9  © 2006-2021 Healthwise, Incorporated. Care instructions adapted under license by [facility] ([facility]). If you have questions about a medical condition or this instruction, always ask your healthcare professional. [place] [address] any warranty or liability for your use of this information.

## 2021-07-21 ENCOUNTER — TELEPHONE (OUTPATIENT)
Dept: FAMILY MEDICINE CLINIC | Age: 66
End: 2021-07-21

## 2021-07-21 NOTE — TELEPHONE ENCOUNTER
----- Message from Matt Romero sent at 7/20/2021  4:40 PM EDT -----  Subject: Appointment Request    Reason for Call: Urgent Return from RN Triage    QUESTIONS  Type of Appointment? Established Patient  Reason for appointment request? No appointments available during search  Additional Information for Provider? NT/Tequila called - pt to bee seen 7-21   for dizziness and vaginal prolapse - no appts showing for tomorrow - did   notify pt about the [facility] walk in - M-F 9-6 - she will head over there   now - should be there in 15 min. Thank you   ---------------------------------------------------------------------------  --------------  CALL BACK INFO  What is the best way for the office to contact you? OK to leave message on   voicemail  Preferred Call Back Phone Number? [phone]  ---------------------------------------------------------------------------  --------------  SCRIPT ANSWERS  Patient needs to be seen today or tomorrow? Yes  Nurse Name? tequila   Have you been diagnosed with, awaiting test results for, or told that you   are suspected of having COVID-19 (Coronavirus)? (If patient has tested   negative or was tested as a requirement for work, school, or travel and   not based on symptoms, answer no)? No  Do you currently have flu-like symptoms including fever or chills, cough,   shortness of breath, difficulty breathing, or new loss of taste or smell? No  Have you had close contact with someone with COVID-19 in the last 14 days? No  (Service Expert  click yes below to proceed with [employer] As Usual   Scheduling)?  Yes

## 2021-07-21 NOTE — TELEPHONE ENCOUNTER
Patient went to the [facility] 07/20/2021 was treated for UTI. Patient stated she is feeling a little better today.

## 2021-07-22 LAB
CULTURE: ABNORMAL
Lab: ABNORMAL
SPECIMEN DESCRIPTION: ABNORMAL

## 2021-07-28 ASSESSMENT — ENCOUNTER SYMPTOMS
NAUSEA: 0
RESPIRATORY NEGATIVE: 1

## 2021-08-23 ENCOUNTER — OFFICE VISIT (OUTPATIENT)
Dept: FAMILY MEDICINE CLINIC | Age: 66
End: 2021-08-23
Payer: COMMERCIAL

## 2021-08-23 ENCOUNTER — HOSPITAL ENCOUNTER (OUTPATIENT)
Age: 66
Setting detail: SPECIMEN
Discharge: HOME | End: 2021-08-23
Payer: MEDICARE

## 2021-08-23 VITALS
HEART RATE: 69 BPM | HEIGHT: 67 IN | TEMPERATURE: 97.7 F | SYSTOLIC BLOOD PRESSURE: 110 MMHG | BODY MASS INDEX: 24.01 KG/M2 | WEIGHT: 153 LBS | RESPIRATION RATE: 12 BRPM | DIASTOLIC BLOOD PRESSURE: 68 MMHG | OXYGEN SATURATION: 97 %

## 2021-08-23 DIAGNOSIS — N39.3 STRESS INCONTINENCE: ICD-10-CM

## 2021-08-23 DIAGNOSIS — Z13.820 OSTEOPOROSIS SCREENING: ICD-10-CM

## 2021-08-23 DIAGNOSIS — R73.03 PRE-DIABETES: ICD-10-CM

## 2021-08-23 DIAGNOSIS — R30.0 BURNING WITH URINATION: ICD-10-CM

## 2021-08-23 DIAGNOSIS — E78.5 HYPERLIPIDEMIA, UNSPECIFIED HYPERLIPIDEMIA TYPE: ICD-10-CM

## 2021-08-23 DIAGNOSIS — N81.10 BLADDER PROLAPSE, FEMALE, ACQUIRED: Primary | ICD-10-CM

## 2021-08-23 DIAGNOSIS — K21.9 GASTROESOPHAGEAL REFLUX DISEASE WITHOUT ESOPHAGITIS: ICD-10-CM

## 2021-08-23 PROBLEM — N39.0 URINARY TRACT INFECTION, SITE NOT SPECIFIED: Status: ACTIVE | Noted: 2020-10-14

## 2021-08-23 LAB
-: ABNORMAL
AMORPHOUS: ABNORMAL
BACTERIA: ABNORMAL
BILIRUBIN URINE: NEGATIVE
CASTS UA: ABNORMAL /LPF (ref 0–8)
COLOR: YELLOW
COMMENT UA: ABNORMAL
CRYSTALS, UA: ABNORMAL /HPF
EPITHELIAL CELLS UA: ABNORMAL /HPF (ref 0–5)
GLUCOSE URINE: NEGATIVE
HBA1C MFR BLD: 5.8 %
KETONES, URINE: NEGATIVE
LEUKOCYTE ESTERASE, URINE: ABNORMAL
MUCUS: ABNORMAL
NITRITE, URINE: POSITIVE
OTHER OBSERVATIONS UA: ABNORMAL
PH UA: 5 (ref 5–8)
PROTEIN UA: ABNORMAL
RBC UA: ABNORMAL /HPF (ref 0–4)
RENAL EPITHELIAL, UA: ABNORMAL /HPF
SPECIFIC GRAVITY UA: 1.02 (ref 1–1.03)
TRICHOMONAS: ABNORMAL
TURBIDITY: ABNORMAL
URINE HGB: ABNORMAL
UROBILINOGEN, URINE: NORMAL
WBC UA: ABNORMAL /HPF (ref 0–5)
YEAST: ABNORMAL

## 2021-08-23 PROCEDURE — 99214 OFFICE O/P EST MOD 30 MIN: CPT | Performed by: NURSE PRACTITIONER

## 2021-08-23 PROCEDURE — 83036 HEMOGLOBIN GLYCOSYLATED A1C: CPT | Performed by: NURSE PRACTITIONER

## 2021-08-23 SDOH — ECONOMIC STABILITY: FOOD INSECURITY: WITHIN THE PAST 12 MONTHS, YOU WORRIED THAT YOUR FOOD WOULD RUN OUT BEFORE YOU GOT MONEY TO BUY MORE.: NEVER TRUE

## 2021-08-23 SDOH — ECONOMIC STABILITY: FOOD INSECURITY: WITHIN THE PAST 12 MONTHS, THE FOOD YOU BOUGHT JUST DIDN'T LAST AND YOU DIDN'T HAVE MONEY TO GET MORE.: NEVER TRUE

## 2021-08-23 ASSESSMENT — PATIENT HEALTH QUESTIONNAIRE - PHQ9
SUM OF ALL RESPONSES TO PHQ QUESTIONS 1-9: 0
2. FEELING DOWN, DEPRESSED OR HOPELESS: 0
SUM OF ALL RESPONSES TO PHQ9 QUESTIONS 1 & 2: 0
SUM OF ALL RESPONSES TO PHQ QUESTIONS 1-9: 0
1. LITTLE INTEREST OR PLEASURE IN DOING THINGS: 0
SUM OF ALL RESPONSES TO PHQ QUESTIONS 1-9: 0

## 2021-08-23 ASSESSMENT — SOCIAL DETERMINANTS OF HEALTH (SDOH): HOW HARD IS IT FOR YOU TO PAY FOR THE VERY BASICS LIKE FOOD, HOUSING, MEDICAL CARE, AND HEATING?: NOT HARD AT ALL

## 2021-08-23 NOTE — PROGRESS NOTES
Aaron Galaviz (:  1955) is a 77 y.o. female,Established patient, here for evaluation of the following chief complaint(s):  Hyperlipidemia         ASSESSMENT/PLAN:  1. Bladder prolapse, female, acquired  -     [address]  2. Burning with urination  -     Urinalysis Reflex to Culture; Future  3. Stress incontinence  -     [facility] [place]  -     Urinalysis Reflex to Culture; Future  4. Pre-diabetes  -     POCT glycosylated hemoglobin (Hb A1C)  5. Gastroesophageal reflux disease without esophagitis  6. Hyperlipidemia, unspecified hyperlipidemia type  7. Osteoporosis screening  -     DEXA BONE DENSITY 2 SITES; Future    Proceed with referral to therapy  Will await urine culture for further treatment if needed  Continue all medications as ordered  Encouraged adequate water intake  Call office with any questions or concerns    Return in about 6 months (around 2022), or if symptoms worsen or fail to improve, for medication follow up, follow up to testing. Subjective   SUBJECTIVE/OBJECTIVE:  Presents to office today for routine follow up. Doing ok. Just had an appt with Dr. Cara José office. She feels something is down there again. Was checked and she has a bulge again. Bladder prolapse. They want her to trial estrogen cream and kegal exercises. Does not want another surgery if it doesn't work. Doesn't tolerate the oxybutin well. But she is trying. Suggested pelvic floor therapy-she is agreeable. a1c in office today 5.8; this is down from 6.2. discussed diet and monitoring. She does say she was eating sugar like crazy back in March. Has cut back on bread and other things. Reports drinking fluids. Normal bowel pattern. Sleeping ok. Review of Systems   Constitutional: Negative for activity change, appetite change, fatigue and fever. HENT: Negative for congestion, rhinorrhea and sore throat.          Regular dental visits   Eyes: Negative for visual disturbance. Wears glasses   Respiratory: Negative for apnea, cough, chest tightness, shortness of breath and wheezing. Cardiovascular: Negative for chest pain and palpitations. Gastrointestinal: Negative for abdominal distention, abdominal pain, constipation, diarrhea, nausea and vomiting. History of GERD  History of esophageal stricture-stretched 2x   Endocrine: Negative for polydipsia, polyphagia and polyuria. Genitourinary: Positive for frequency and urgency. Negative for decreased urine volume, difficulty urinating and dysuria. Frequent UTI's, continued incontinence  Follows with urology-Dr. Annelise Solorzano   Musculoskeletal: Negative for arthralgias. Skin: Negative for color change. Allergic/Immunologic: Positive for environmental allergies. Negative for immunocompromised state. Bactrim  Cipro  Sulfa  Nitrofurantoin  Zocor   Neurological: Negative for syncope, weakness, light-headedness and headaches. Psychiatric/Behavioral: Negative for decreased concentration, dysphoric mood, sleep disturbance and suicidal ideas. The patient is not nervous/anxious. Objective   Physical Exam  Vitals and nursing note reviewed. Constitutional:       General: She is not in acute distress. Appearance: She is not ill-appearing. HENT:      Head: Normocephalic. Nose: Nose normal.      Mouth/Throat:      Lips: Pink. Pulmonary:      Effort: Pulmonary effort is normal. No respiratory distress. Neurological:      Mental Status: She is alert and oriented to person, place, and time. Psychiatric:         Attention and Perception: Attention normal.         Speech: Speech normal.         Behavior: Behavior is cooperative. An electronic signature was used to authenticate this note.     --ALANA Cotton - CRISTAL

## 2021-08-25 LAB
CULTURE: ABNORMAL
Lab: ABNORMAL
SPECIMEN DESCRIPTION: ABNORMAL

## 2021-08-26 DIAGNOSIS — N39.0 URINARY TRACT INFECTION WITHOUT HEMATURIA, SITE UNSPECIFIED: Primary | ICD-10-CM

## 2021-08-26 RX ORDER — CEPHALEXIN 500 MG/1
500 CAPSULE ORAL 4 TIMES DAILY
Qty: 40 CAPSULE | Refills: 0 | Status: SHIPPED | OUTPATIENT
Start: 2021-08-26 | End: 2021-09-05

## 2021-09-06 ASSESSMENT — ENCOUNTER SYMPTOMS
WHEEZING: 0
CONSTIPATION: 0
SHORTNESS OF BREATH: 0
NAUSEA: 0
APNEA: 0
CHEST TIGHTNESS: 0
RHINORRHEA: 0
COUGH: 0
ABDOMINAL DISTENTION: 0
COLOR CHANGE: 0
VOMITING: 0
DIARRHEA: 0
ABDOMINAL PAIN: 0
SORE THROAT: 0

## 2021-09-23 ENCOUNTER — TELEPHONE (OUTPATIENT)
Dept: FAMILY MEDICINE CLINIC | Age: 66
End: 2021-09-23

## 2021-09-23 DIAGNOSIS — Z12.31 ENCOUNTER FOR SCREENING MAMMOGRAM FOR BREAST CANCER: Primary | ICD-10-CM

## 2021-09-23 NOTE — TELEPHONE ENCOUNTER
Patient stopped into the office requesting her annual screening mamm to be ordered. Patient will  when complete.

## 2021-10-05 ENCOUNTER — HOSPITAL ENCOUNTER (OUTPATIENT)
Age: 66
Setting detail: SPECIMEN
Discharge: HOME | End: 2021-10-05
Payer: MEDICARE

## 2021-10-07 LAB
CULTURE: ABNORMAL
Lab: ABNORMAL
SPECIMEN DESCRIPTION: ABNORMAL

## 2021-10-08 DIAGNOSIS — E78.5 HYPERLIPIDEMIA, UNSPECIFIED HYPERLIPIDEMIA TYPE: ICD-10-CM

## 2021-10-08 RX ORDER — ATORVASTATIN CALCIUM 20 MG/1
20 TABLET, FILM COATED ORAL DAILY
Qty: 30 TABLET | Refills: 5 | Status: SHIPPED | OUTPATIENT
Start: 2021-10-08 | End: 2022-04-07 | Stop reason: SDUPTHER

## 2021-10-08 NOTE — TELEPHONE ENCOUNTER
----- Message from Kevin Alanis sent at 10/8/2021  2:46 PM EDT -----  Subject: Refill Request    QUESTIONS  Name of Medication? atorvastatin (LIPITOR) 20 MG tablet  Patient-reported dosage and instructions? 20mg, 1 tablet my mouth daily  How many days do you have left? 2  Preferred Pharmacy? [id number] [facility] phone number (if available)? [phone]  ---------------------------------------------------------------------------  --------------  CALL BACK INFO  What is the best way for the office to contact you? OK to leave message on   voicemail  Preferred Call Back Phone Number?  [phone]

## 2021-11-09 ENCOUNTER — OFFICE VISIT (OUTPATIENT)
Dept: OBGYN CLINIC | Age: 66
End: 2021-11-09
Payer: COMMERCIAL

## 2021-11-09 VITALS
SYSTOLIC BLOOD PRESSURE: 130 MMHG | DIASTOLIC BLOOD PRESSURE: 78 MMHG | HEIGHT: 67 IN | WEIGHT: 156 LBS | BODY MASS INDEX: 24.48 KG/M2 | HEART RATE: 77 BPM

## 2021-11-09 DIAGNOSIS — N99.3 PROLAPSE OF VAGINAL CUFF AFTER HYSTERECTOMY: ICD-10-CM

## 2021-11-09 PROCEDURE — 99213 OFFICE O/P EST LOW 20 MIN: CPT | Performed by: OBSTETRICS & GYNECOLOGY

## 2021-11-09 ASSESSMENT — ENCOUNTER SYMPTOMS
ABDOMINAL PAIN: 0
COUGH: 0
BACK PAIN: 0
SHORTNESS OF BREATH: 0

## 2021-11-09 NOTE — PROGRESS NOTES
[facility]  [facility] - [address] [facility] [address] [place] [address]  Dept: [phone]  Dept Fax: [phone]    21    Chief Complaint   Patient presents with    Other     pelvic issues       Denis Langford 77 y.o. has a complaint of some prolapse. She has been having issues for the last 6 months. She saw they urologist and they told her it was her bladder. She says she can feel it and see it and she can't live with it. She does have issues with leaking with laugh, cough and sneeze. She did have posterior colporrhaphy 1 year ago and that is doing well she says. Review of Systems   Constitutional: Negative for chills and fever. HENT: Negative for congestion. Respiratory: Negative for cough and shortness of breath. Cardiovascular: Negative for chest pain and palpitations. Gastrointestinal: Negative for abdominal pain. Genitourinary: Negative for pelvic pain and vaginal discharge. Prolapse   Musculoskeletal: Negative for back pain. Neurological: Negative for dizziness and light-headedness. Psychiatric/Behavioral: The patient is not nervous/anxious. Gynecologic History  No LMP recorded. Patient has had a hysterectomy.   Contraception: post menopausal status and status post hysterectomy  Last Pap: many years  Results: normal  Last Mammogram: 2017  Results: normal    Obstetric History  : 3  Para: 3  AB: 3    Past Medical History:   Diagnosis Date    Arthritis     lower back and knee    Elevated cholesterol     on rx per pcp    Esophageal stricture     GERD (gastroesophageal reflux disease)     otc prilosec    Posterior vaginal wall prolapse     Snores     denies apnea    Vaginal enterocele     Wears prescription eyeglasses     Wellness examination     Radha WARNER  last seen 2020     Past Surgical History:   Procedure Laterality Date    BLADDER SUSPENSION  09/10/2020    LYNX MID URETHRAL SLING INSERTION     COLONOSCOPY      2019    COLPORRHAPHY  09/10/2020    posterior colporrhaphy    DILATION AND CURETTAGE      ENDOSCOPY, COLON, DIAGNOSTIC      2019 stricture w/ dilitation . [facility]OT SURGERY      left big toe hemangiomaremoved, benign    HYSTERECTOMY  02/2007    TVH withBSO , A&P repair    HYSTEROSCOPY  03/2007    novasure    LAPAROSCOPY  03/2007    BTL    URETHRAL SURGERY N/A 9/10/2020    LYNX MID URETHRAL SLING INSERTION performed by Frankey Letters, MD at [address] [place] [address] N/A 9/10/2020    POSTERIOR COLPORRHAPHY performed by Sachin Singh MD at [facility]   Allergen Reactions    Bactrim Other (See Comments)     Palpitations      Ciprofloxacin     Nitrofurantoin     Sulfa Antibiotics     Zocor [Simvastatin] Other (See Comments)     Muscle cramps     Current Outpatient Medications   Medication Sig Dispense Refill    atorvastatin (LIPITOR) 20 MG tablet Take 1 tablet by mouth daily 30 tablet 5    vitamin D 25 MCG (1000 UT) CAPS Take by mouth      GLUCOSAMINE-CHONDROITIN ER PO Take by mouth daily      Ascorbic Acid (VITAMIN C) 1000 MG tablet Take 1,000 mg by mouth daily      Multiple Vitamins-Minerals (MULTIVITAMIN WOMEN 50+ PO) Take by mouth      oxybutynin (DITROPAN) 5 MG tablet  (Patient not taking: Reported on 11/9/2021)      hydrocortisone 0.5 % cream Apply 1 Dose topically 2 times daily for 7 days Apply topically 2 times daily. 1 Tube 1    ibuprofen (ADVIL;MOTRIN) 800 MG tablet Take 1 tablet by mouth every 8 hours as needed for Pain (Patient not taking: Reported on 8/23/2021) 30 tablet 0    docusate sodium (COLACE) 100 MG capsule Take 1 capsule by mouth 2 times daily as needed for Constipation (Patient not taking: Reported on 8/23/2021) 60 capsule 1     No current facility-administered medications for this visit. 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   Tobacco Use    Smoking status: Never Smoker    Smokeless tobacco: Never Used   Vaping Use    Vaping Use: Never used   Substance and Sexual Activity    Alcohol use: No    Drug use: No    Sexual activity: Not Currently   Other Topics Concern    Not on file   Social History Narrative    Not on file     Social Determinants of Health     Financial Resource Strain: Low Risk     Difficulty of Paying Living Expenses: Not hard at all   Food Insecurity: No Food Insecurity    Worried About [facility] in the Last Year: Never true    [facility] in the Last Year: Never true   Transportation Needs:     Lack of Transportation (Medical): Not on file    Lack of Transportation (Non-Medical):  Not on file   Physical Activity:     Days of Exercise per Week: Not on file    Minutes of Exercise per Session: Not on file   Stress:     Feeling of Stress : Not on file   Social Connections:     Frequency of Communication with Friends and Family: Not on file    Frequency of Social Gatherings with Friends and Family: Not on file    Attends Restoration Services: Not on file    Active Member of [address] [place] [address] or Organizations: Not on file    Attends Club or Organization Meetings: Not on file    Marital Status: Not on file   Intimate Partner Violence:     Fear of Current or Ex-Partner: Not on file    Emotionally Abused: Not on file    Physically Abused: Not on file    Sexually Abused: Not on file   Housing Stability:     Unable to Pay for Housing in the Last Year: Not on file    Number of [place] in the Last Year: Not on file    Unstable Housing in the Last Year: Not on file     Family History   Problem Relation Age of Onset    Heart Disease Maternal Grandmother         CHF    Stroke Paternal Grandmother     Heart Disease Mother     Cancer Mother     Heart Disease Father     Cancer Father        Physical exam Physical Exam  Constitutional:       Appearance: Normal appearance. She is normal weight. Genitourinary:      Vaginal cuff intact. Apical vaginal prolapse present. Mild vaginal atrophy present. Cervix is absent. Eyes:      Extraocular Movements: Extraocular movements intact. Pulmonary:      Effort: Pulmonary effort is normal.   Neurological:      Mental Status: She is alert and oriented to person, place, and time. Psychiatric:         Mood and Affect: Mood normal.         Behavior: Behavior normal.         Thought Content: Thought content normal.         Judgment: Judgment normal.         Assessment/Plan  1. Prolapse of vaginal cuff after hysterectomy  - Discussed the type of prolapse that the patient has, and the causes for prolapse in detail. Pt showed images of her specific type of prolapse. - Discussed options of continued observation if she is not having any issues with the prolapse vs pelvic floor PT vs pessary vs surgical options. - Discussed risks/benefits of various options in detail. Patient given information about different options if requested.    - Pt would like to try a pessary      Pt to follow up for pessary fitting  Marcellus Bain MD  [address]

## 2021-11-18 ENCOUNTER — TELEPHONE (OUTPATIENT)
Dept: FAMILY MEDICINE CLINIC | Age: 66
End: 2021-11-18

## 2021-11-18 NOTE — TELEPHONE ENCOUNTER
ED Follow up Call    Reason for ED visit:  Neck pain injury   Status:     improved    Did you call your PCP prior to going to the ED? No      Did you receive a discharge instructions from the Emergency Room? Yes  Review of Instructions:     Understands what to report/when to return?:  Yes   Understands discharge instructions?:  Yes   Following discharge instructions?:  Yes   If not why? Are there any new complaints of pain? Yes   New Pain Meds? Yes    Constipation prophylaxis needed? No    If you have a wound is the dressing clean, dry, and intact? N/A  Understands wound care regimen? N/A    Are there any other complaints/concerns that you wish to tell your provider? No concerns at this time. FU appts/Provider:    Future Appointments   Date Time Provider Catrachito Lees   12/7/2021 10:45 AM Dasha Caro MD [facility]   12/14/2021  3:15 PM [facility]   2/23/2022  1:30 PM ALANA Key NP [facility]           New Medications?:   Yes      Medication Reconciliation by phone - No  Understands Medications? Yes  Taking Medications? Yes  Can you swallow your pills? Yes    Any further needs in the home i.e. Equipment?   No    Link to services in community?:  No   Which services:

## 2021-12-06 NOTE — PROGRESS NOTES
OB/GYN   Procedure Note        Lucrecia Carballo   2021           ALANA Singh - NP       Subjective:   Lucrecia Carballo is a 77 y.o. female   here for pessary fitting. She has had progressively worsening pelvic organ prolapse over the past 6 months. Vitals:   Blood pressure 115/75, pulse 65, height 5' 7\" (1.702 m), weight 162 lb 2 oz (73.5 kg), not currently breastfeeding. Procedure: Pessary fitting and sizing     Indications: stage 2 prolapse      Procedure Details: The patient was positioned comfortably on the exam table. After a bi-manual exam; the uterus and prolapse was reduced without difficulty. There was no cervical motion tenderness or adnexal masses and the bladder was smooth, non-tender and without palpable masses. Based on exam, the ring pessary with support was chosen and placed. Patient ambulated and voided without difficulty with it in place. She would like to try to place and remove the pessary herself at the next visit. Pessary was replaced without any difficulty. T.O.S. Ointment was placed with the pessary to decrease discharge/odor. The patient tolerated the procedure without difficulty.       Assessment & Plan:  Lucrecia Carballo is a 77 y.o.  with anterior and apical prolapse              - ring with support pessary #4 placed without difficulty              - patient to return in 1 month for pessary maintenance and evaluation              - call or come in with any difficulty voiding, increased malodorous vaginal discharge, pain or bleeding     Patient Active Problem List   Diagnosis    Rectocele    Stress incontinence    Posterior colporrhaphy 9/10/2020    Post-op pain    Hyperlipidemia    Gastroesophageal reflux disease without esophagitis    Urinary tract infection, site not specified    Prolapse of vaginal cuff after hysterectomy       Mahad Gupta MD   [address] [place] [address]  2021 10:53 AM

## 2021-12-07 ENCOUNTER — PROCEDURE VISIT (OUTPATIENT)
Dept: OBGYN CLINIC | Age: 66
End: 2021-12-07
Payer: COMMERCIAL

## 2021-12-07 VITALS
DIASTOLIC BLOOD PRESSURE: 75 MMHG | HEART RATE: 65 BPM | WEIGHT: 162.13 LBS | SYSTOLIC BLOOD PRESSURE: 115 MMHG | HEIGHT: 67 IN | BODY MASS INDEX: 25.45 KG/M2

## 2021-12-07 DIAGNOSIS — N99.3 PROLAPSE OF VAGINAL CUFF AFTER HYSTERECTOMY: Primary | ICD-10-CM

## 2021-12-07 PROCEDURE — 57160 INSERT PESSARY/OTHER DEVICE: CPT | Performed by: OBSTETRICS & GYNECOLOGY

## 2021-12-07 RX ORDER — CEPHALEXIN 500 MG/1
CAPSULE ORAL
COMMUNITY
Start: 2021-10-19 | End: 2022-01-11

## 2021-12-17 ENCOUNTER — TELEPHONE (OUTPATIENT)
Dept: FAMILY MEDICINE CLINIC | Age: 66
End: 2021-12-17

## 2021-12-17 NOTE — TELEPHONE ENCOUNTER
----- Message from Brandynangel Mei sent at 12/15/2021  4:34 PM EST -----  Subject: Appointment Request    Reason for Call: Routine ED Follow Up Visit    QUESTIONS  Type of Appointment? Established Patient  Reason for appointment request? Available appointments did not meet   patient need  Additional Information for Provider? called back to reschedule appointment   for 12/23 at [phone] but the only appointment available were in Jan  ---------------------------------------------------------------------------  --------------  [address]  What is the best way for the office to contact you? OK to leave message on   voicemail  Preferred Call Back Phone Number? [phone]  ---------------------------------------------------------------------------  --------------  SCRIPT ANSWERS  Relationship to Patient? Self  Have your symptoms changed? No  Have you been diagnosed with, awaiting test results for, or told that you   are suspected of having COVID-19 (Coronavirus)? (If patient has tested   negative or was tested as a requirement for work, school, or travel and   not based on symptoms, answer no)? No  Within the past two weeks have you developed any of the following symptoms   (answer no if symptoms have been present longer than 2 weeks or began   more than 2 weeks ago)? Fever or Chills, Cough, Shortness of breath or   difficulty breathing, Loss of taste or smell, Sore throat, Nasal   congestion, Sneezing or runny nose, Fatigue or generalized body aches   (answer no if pain is specific to a body part e.g. back pain), Diarrhea,   Headache? No  Have you had close contact with someone with COVID-19 in the last 14 days? No  (Service Expert  click yes below to proceed with [place] As Usual   Scheduling)?  Yes

## 2022-01-11 ENCOUNTER — OFFICE VISIT (OUTPATIENT)
Dept: OBGYN CLINIC | Age: 67
End: 2022-01-11
Payer: COMMERCIAL

## 2022-01-11 VITALS
WEIGHT: 162.6 LBS | HEART RATE: 63 BPM | BODY MASS INDEX: 25.47 KG/M2 | DIASTOLIC BLOOD PRESSURE: 80 MMHG | SYSTOLIC BLOOD PRESSURE: 131 MMHG

## 2022-01-11 DIAGNOSIS — Z46.89 ENCOUNTER FOR PESSARY MAINTENANCE: Primary | ICD-10-CM

## 2022-01-11 DIAGNOSIS — Z96.0 PRESENCE OF PESSARY: ICD-10-CM

## 2022-01-11 DIAGNOSIS — N99.3 PROLAPSE OF VAGINAL CUFF AFTER HYSTERECTOMY: ICD-10-CM

## 2022-01-11 PROCEDURE — 99213 OFFICE O/P EST LOW 20 MIN: CPT | Performed by: OBSTETRICS & GYNECOLOGY

## 2022-01-11 RX ORDER — CEPHALEXIN 500 MG/1
500 CAPSULE ORAL 4 TIMES DAILY
Qty: 20 CAPSULE | Refills: 0 | Status: SHIPPED | OUTPATIENT
Start: 2022-01-11 | End: 2022-01-16

## 2022-01-11 ASSESSMENT — ENCOUNTER SYMPTOMS
COUGH: 0
SHORTNESS OF BREATH: 0
ABDOMINAL PAIN: 0
BACK PAIN: 0

## 2022-01-11 NOTE — PROGRESS NOTES
[address] [facility] - [address] [facility]  Dept: [phone]    Chief Complaint   Patient presents with    Other     pessary check. possible UTI       Justino Dow is a 78 yo female who presents for pessary maintenance. She was last here 1 month ago for a pessary fitting. She says she is doing well and not having any problems. She denies any vaginal bleeding or abnormal vaginal discharge. She is still happy with her pessary. Review of Systems   Constitutional: Negative for chills and fatigue. HENT: Negative for congestion. Respiratory: Negative for cough and shortness of breath. Gastrointestinal: Negative for abdominal pain. Genitourinary: Positive for dysuria. Negative for vaginal discharge. Musculoskeletal: Negative for back pain. Neurological: Negative for dizziness and light-headedness. Psychiatric/Behavioral: The patient is not nervous/anxious. O:Blood pressure 131/80, pulse 63, weight 162 lb 9.6 oz (73.8 kg), not currently breastfeeding. Gen: alert, no apparent distress  Pelvic:  There is not any signs of infection; The vaginal vault is without any signs of erythema or erosion. There is no vaginal discharge or odor appreciated. Pt taught to remove the pessary, clean it an insert it on her own. Pessary removed easily and cleansed with water and soap. Pessary replaced without difficulty. T.O.S. Ointment was placed with the pessary to decreasedischarge/odor. A/P:   Diagnosis Orders   1. Encounter for pessary maintenance     2. Prolapse of vaginal cuff after hysterectomy     3.  Presence of pessary         Pt to follow up in 1 year  Cherelle De Santiago MD  [address]

## 2022-02-23 ENCOUNTER — OFFICE VISIT (OUTPATIENT)
Dept: FAMILY MEDICINE CLINIC | Age: 67
End: 2022-02-23
Payer: COMMERCIAL

## 2022-02-23 ENCOUNTER — HOSPITAL ENCOUNTER (OUTPATIENT)
Age: 67
Setting detail: SPECIMEN
Discharge: HOME | End: 2022-02-23

## 2022-02-23 VITALS
SYSTOLIC BLOOD PRESSURE: 120 MMHG | HEIGHT: 67 IN | HEART RATE: 63 BPM | BODY MASS INDEX: 25.43 KG/M2 | TEMPERATURE: 98.1 F | OXYGEN SATURATION: 98 % | WEIGHT: 162 LBS | RESPIRATION RATE: 14 BRPM | DIASTOLIC BLOOD PRESSURE: 80 MMHG

## 2022-02-23 DIAGNOSIS — Z12.11 COLON CANCER SCREENING: ICD-10-CM

## 2022-02-23 DIAGNOSIS — L85.9 HYPERKERATOSIS: ICD-10-CM

## 2022-02-23 DIAGNOSIS — N39.0 URINARY TRACT INFECTION WITHOUT HEMATURIA, SITE UNSPECIFIED: ICD-10-CM

## 2022-02-23 DIAGNOSIS — E78.5 HYPERLIPIDEMIA, UNSPECIFIED HYPERLIPIDEMIA TYPE: ICD-10-CM

## 2022-02-23 DIAGNOSIS — R73.03 PRE-DIABETES: ICD-10-CM

## 2022-02-23 DIAGNOSIS — R30.0 DYSURIA: ICD-10-CM

## 2022-02-23 DIAGNOSIS — Z13.29 THYROID DISORDER SCREEN: ICD-10-CM

## 2022-02-23 DIAGNOSIS — L98.9 SKIN LESION: ICD-10-CM

## 2022-02-23 DIAGNOSIS — Z00.00 INITIAL MEDICARE ANNUAL WELLNESS VISIT: Primary | ICD-10-CM

## 2022-02-23 LAB
ALBUMIN SERPL-MCNC: 4.6 G/DL (ref 3.5–5.2)
ALBUMIN/GLOBULIN RATIO: 2.2 (ref 1–2.5)
ALP BLD-CCNC: 131 U/L (ref 35–104)
ALT SERPL-CCNC: 27 U/L (ref 5–33)
ANION GAP SERPL CALCULATED.3IONS-SCNC: 17 MMOL/L (ref 9–17)
AST SERPL-CCNC: 28 U/L
BILIRUB SERPL-MCNC: 0.72 MG/DL (ref 0.3–1.2)
BILIRUBIN, POC: NORMAL
BLOOD URINE, POC: NORMAL
BUN BLDV-MCNC: 14 MG/DL (ref 8–23)
CALCIUM SERPL-MCNC: 9.7 MG/DL (ref 8.6–10.4)
CHLORIDE BLD-SCNC: 101 MMOL/L (ref 98–107)
CHOLESTEROL, FASTING: 171 MG/DL
CHOLESTEROL/HDL RATIO: 3.6
CLARITY, POC: NORMAL
CO2: 27 MMOL/L (ref 20–31)
COLOR, POC: YELLOW
CREAT SERPL-MCNC: 0.56 MG/DL (ref 0.5–0.9)
GFR AFRICAN AMERICAN: >60 ML/MIN
GFR NON-AFRICAN AMERICAN: >60 ML/MIN
GFR SERPL CREATININE-BSD FRML MDRD: ABNORMAL ML/MIN/{1.73_M2}
GLUCOSE FASTING: 82 MG/DL (ref 70–99)
GLUCOSE URINE, POC: NORMAL
HCT VFR BLD CALC: 46.9 % (ref 36.3–47.1)
HDLC SERPL-MCNC: 47 MG/DL
HEMOGLOBIN: 14.7 G/DL (ref 11.9–15.1)
KETONES, POC: NORMAL
LDL CHOLESTEROL: 102 MG/DL (ref 0–130)
LEUKOCYTE EST, POC: NORMAL
MCH RBC QN AUTO: 28.3 PG (ref 25.2–33.5)
MCHC RBC AUTO-ENTMCNC: 31.3 G/DL (ref 28.4–34.8)
MCV RBC AUTO: 90.2 FL (ref 82.6–102.9)
NITRITE, POC: NORMAL
NRBC AUTOMATED: 0 PER 100 WBC
PDW BLD-RTO: 13.1 % (ref 11.8–14.4)
PH, POC: 5.5
PLATELET # BLD: 189 K/UL (ref 138–453)
PMV BLD AUTO: 12.5 FL (ref 8.1–13.5)
POTASSIUM SERPL-SCNC: 4.6 MMOL/L (ref 3.7–5.3)
PROTEIN, POC: NORMAL
RBC # BLD: 5.2 M/UL (ref 3.95–5.11)
SODIUM BLD-SCNC: 145 MMOL/L (ref 135–144)
SPECIFIC GRAVITY, POC: 1.02
TOTAL PROTEIN: 6.7 G/DL (ref 6.4–8.3)
TRIGLYCERIDE, FASTING: 112 MG/DL
TSH SERPL DL<=0.05 MIU/L-ACNC: 1.4 MIU/L (ref 0.3–5)
UROBILINOGEN, POC: 0.2
WBC # BLD: 8.4 K/UL (ref 3.5–11.3)

## 2022-02-23 PROCEDURE — 81003 URINALYSIS AUTO W/O SCOPE: CPT | Performed by: NURSE PRACTITIONER

## 2022-02-23 PROCEDURE — G0438 PPPS, INITIAL VISIT: HCPCS | Performed by: NURSE PRACTITIONER

## 2022-02-23 RX ORDER — GRANULES FOR ORAL 3 G/1
3 POWDER ORAL ONCE
Qty: 1 EACH | Refills: 0 | Status: SHIPPED | OUTPATIENT
Start: 2022-02-23 | End: 2022-02-23

## 2022-02-23 ASSESSMENT — PATIENT HEALTH QUESTIONNAIRE - PHQ9
SUM OF ALL RESPONSES TO PHQ QUESTIONS 1-9: 0
SUM OF ALL RESPONSES TO PHQ QUESTIONS 1-9: 0
SUM OF ALL RESPONSES TO PHQ9 QUESTIONS 1 & 2: 0
SUM OF ALL RESPONSES TO PHQ QUESTIONS 1-9: 0
1. LITTLE INTEREST OR PLEASURE IN DOING THINGS: 0
SUM OF ALL RESPONSES TO PHQ QUESTIONS 1-9: 0
SUM OF ALL RESPONSES TO PHQ9 QUESTIONS 1 & 2: 0
SUM OF ALL RESPONSES TO PHQ QUESTIONS 1-9: 0
2. FEELING DOWN, DEPRESSED OR HOPELESS: 0
SUM OF ALL RESPONSES TO PHQ QUESTIONS 1-9: 0
SUM OF ALL RESPONSES TO PHQ QUESTIONS 1-9: 0
2. FEELING DOWN, DEPRESSED OR HOPELESS: 0
1. LITTLE INTEREST OR PLEASURE IN DOING THINGS: 0
SUM OF ALL RESPONSES TO PHQ QUESTIONS 1-9: 0

## 2022-02-23 ASSESSMENT — LIFESTYLE VARIABLES
HOW OFTEN DO YOU HAVE A DRINK CONTAINING ALCOHOL: NEVER
HOW MANY STANDARD DRINKS CONTAINING ALCOHOL DO YOU HAVE ON A TYPICAL DAY: 1 OR 2

## 2022-02-23 NOTE — PATIENT INSTRUCTIONS
Personalized Preventive Plan for Jane Hurtado - 2/23/2022  Medicare offers a range of preventive health benefits. Some of the tests and screenings are paid in full while other may be subject to a deductible, co-insurance, and/or copay. Some of these benefits include a comprehensive review of your medical history including lifestyle, illnesses that may run in your family, and various assessments and screenings as appropriate. After reviewing your medical record and screening and assessments performed today your provider may have ordered immunizations, labs, imaging, and/or referrals for you. A list of these orders (if applicable) as well as your Preventive Care list are included within your After Visit Summary for your review. Other Preventive Recommendations:    · A preventive eye exam performed by an eye specialist is recommended every 1-2 years to screen for glaucoma; cataracts, macular degeneration, and other eye disorders. · A preventive dental visit is recommended every 6 months. · Try to get at least 150 minutes of exercise per week or 10,000 steps per day on a pedometer . · Order or download the FREE \"Exercise & Physical Activity: Your Everyday Guide\" from [employer]. Call 8-200.965.2671 or search [employer] online. · You need 0053-6379 mg of calcium and 7506-0258 IU of vitamin D per day. It is possible to meet your calcium requirement with diet alone, but a vitamin D supplement is usually necessary to meet this goal.  · When exposed to the sun, use a sunscreen that protects against both UVA and UVB radiation with an SPF of 30 or greater. Reapply every 2 to 3 hours or after sweating, drying off with a towel, or swimming. · Always wear a seat belt when traveling in a car. Always wear a helmet when riding a bicycle or motorcycle.

## 2022-02-23 NOTE — PROGRESS NOTES
Medicare Annual Wellness Visit    Trenton Medrano is here for Hyperlipidemia    [address] was seen today for hyperlipidemia. Diagnoses and all orders for this visit:    Initial Medicare annual wellness visit    Hyperlipidemia, unspecified hyperlipidemia type  -     CBC; Future  -     Lipid, Fasting; Future  -     Comprehensive Metabolic Panel, Fasting; Future  -     Hemoglobin A1C; Future  -     TSH; Future    Urinary tract infection without hematuria, site unspecified  -     POCT Urinalysis No Micro (Auto)  -     fosfomycin tromethamine (MONUROL) 3 g PACK; Take 1 packet by mouth once for 1 dose  -     Culture, Urine; Future    Dysuria  -     POCT Urinalysis No Micro (Auto)    Pre-diabetes  -     CBC; Future  -     Lipid, Fasting; Future  -     Comprehensive Metabolic Panel, Fasting; Future  -     Hemoglobin A1C; Future  -     TSH; Future    Skin lesion    Hyperkeratosis  -     [facility]) - VIVEK Dumas MD, Plastic Surgery, [place]    Thyroid disorder screen  -     CBC; Future  -     Lipid, Fasting; Future  -     Comprehensive Metabolic Panel, Fasting; Future  -     Hemoglobin A1C; Future  -     TSH; Future    Colon cancer screening         Recommendations for Preventive Services Due: see orders and patient instructions/AVS.  Recommended screening schedule for the next 5-10 years is provided to the patient in written form: see Patient Instructions/AVS.     Return for Medicare Annual Wellness Visit in 1 year. Reviewed and updated this visit:  Tobacco  Allergies  Meds  Med Hx  Surg Hx  Soc Hx  Fam Hx      Subjective     Here today for medicare annual wellness visit. However has concerns about continued Uti symptoms. Continues to have UTI symptoms. UA today shows a slight UTI. Will send for culture. May need to consider going back to urology or referral to infectious disease.    Dr. Jj Hendricks fitted her for a pessary to help with incontinence but is keeping the vagina in place as that had prolapsed        Patient's complete Health Risk Assessment and screening values have been reviewed and are found in Flowsheets. The following problems were reviewed today and where indicated follow up appointments were made and/or referrals ordered. Positive Risk Factor Screenings with Interventions:               General Health and ACP:       Advance Directives     Power of  Living Will ACP-Advance Directive ACP-Power of     Not on File Not on File Not on File Not on File      General Health Risk Interventions:  · No Living Will: Patient declines ACP discussion/assistance           Objective   Vitals:    02/23/22 1326   BP: 120/80   Site: Left Upper Arm   Position: Sitting   Cuff Size: Medium Adult   Pulse: 63   Resp: 14   Temp: 98.1 °F (36.7 °C)   TempSrc: Temporal   SpO2: 98%   Weight: 162 lb (73.5 kg)   Height: 5' 7\" (1.702 m)      Body mass index is 25.37 kg/m². Allergies   Allergen Reactions    Bactrim Other (See Comments)     Palpitations      Ciprofloxacin     Nitrofurantoin     Sulfa Antibiotics     Zocor [Simvastatin] Other (See Comments)     Muscle cramps     Prior to Visit Medications    Medication Sig Taking?  Authorizing Provider   atorvastatin (LIPITOR) 20 MG tablet Take 1 tablet by mouth daily Yes Mat Mealing, APRN - NP   ibuprofen (ADVIL;MOTRIN) 800 MG tablet Take 1 tablet by mouth every 8 hours as needed for Pain Yes Lavonne Monahan DO   vitamin D 25 MCG (1000 UT) CAPS Take by mouth Yes Historical Provider, MD   GLUCOSAMINE-CHONDROITIN ER PO Take by mouth daily Yes Historical Provider, MD   Ascorbic Acid (VITAMIN C) 1000 MG tablet Take 1,000 mg by mouth daily Yes Historical Provider, MD   Multiple Vitamins-Minerals (MULTIVITAMIN WOMEN 50+ PO) Take by mouth Yes Historical Provider, MD   ciprofloxacin (CIPRO) 500 MG tablet   Historical Provider, MD   indomethacin (INDOCIN) 25 MG capsule Take 1 capsule by mouth 3 times daily for 10 days  Saskia Grijalva [place]       CareTeam (Including outside providers/suppliers regularly involved in providing care):   Patient Care Team:  ALANA Suggs NP as PCP - General (Nurse Practitioner)  ALANA Suggs NP as PCP - [facility]

## 2022-02-24 LAB
ESTIMATED AVERAGE GLUCOSE: 120 MG/DL
HBA1C MFR BLD: 5.8 % (ref 4–6)

## 2022-02-25 ENCOUNTER — NURSE TRIAGE (OUTPATIENT)
Dept: OTHER | Facility: CLINIC | Age: 67
End: 2022-02-25

## 2022-02-25 LAB
CULTURE: ABNORMAL
SPECIMEN DESCRIPTION: ABNORMAL

## 2022-02-25 NOTE — TELEPHONE ENCOUNTER
Received call from Mel Andrews at [facility] with [employer]. Subjective: Caller states \"Woke up with dizziness this morning. \"     Current Symptoms: Dizziness    Onset: This morning     Associated Symptoms: NA    Pain Severity: Burning with urination at times     Temperature: Denies fever and feeling feverish/chills     What has been tried: Went back to bed, food     LMP: NA Pregnant: NA    Recommended disposition: See in Office Today. Advised caller if unable to get an appointment in the suggested time frame to go to an [facility] or walk-in clinic, caller agreeable. Care advice provided, patient verbalizes understanding; denies any other questions or concerns; instructed to call back for any new or worsening symptoms. Patient/Caller agrees with recommended disposition; writer provided warm transfer to Annalise Trinidad at [facility] for appointment scheduling. Attention Provider: Thank you for allowing me to participate in the care of your patient. The patient was connected to triage in response to information provided to the ECC/PSC. Please do not respond through this encounter as the response is not directed to a shared pool.     Reason for Disposition   MODERATE dizziness (e.g., interferes with normal activities) (Exception: dizziness caused by heat exposure, sudden standing, or poor fluid intake)    Protocols used: DIZZINESS-ADULT-OH

## 2022-02-26 ENCOUNTER — OFFICE VISIT (OUTPATIENT)
Dept: PRIMARY CARE CLINIC | Age: 67
End: 2022-02-26
Payer: COMMERCIAL

## 2022-02-26 VITALS
DIASTOLIC BLOOD PRESSURE: 84 MMHG | OXYGEN SATURATION: 99 % | WEIGHT: 162 LBS | HEART RATE: 66 BPM | TEMPERATURE: 98.2 F | BODY MASS INDEX: 25.37 KG/M2 | SYSTOLIC BLOOD PRESSURE: 122 MMHG

## 2022-02-26 DIAGNOSIS — N30.01 ACUTE CYSTITIS WITH HEMATURIA: Primary | ICD-10-CM

## 2022-02-26 DIAGNOSIS — M10.272 ACUTE DRUG-INDUCED GOUT INVOLVING TOE OF LEFT FOOT: ICD-10-CM

## 2022-02-26 PROCEDURE — 99213 OFFICE O/P EST LOW 20 MIN: CPT | Performed by: PHYSICIAN ASSISTANT

## 2022-02-26 RX ORDER — INDOMETHACIN 25 MG/1
25 CAPSULE ORAL 3 TIMES DAILY
Qty: 30 CAPSULE | Refills: 0 | Status: SHIPPED | OUTPATIENT
Start: 2022-02-26 | End: 2022-07-28 | Stop reason: ALTCHOICE

## 2022-02-26 RX ORDER — CEPHALEXIN 500 MG/1
500 CAPSULE ORAL 2 TIMES DAILY
Qty: 14 CAPSULE | Refills: 0 | Status: SHIPPED | OUTPATIENT
Start: 2022-02-26 | End: 2022-03-05

## 2022-02-26 RX ORDER — PREDNISONE 20 MG/1
20 TABLET ORAL 2 TIMES DAILY
Qty: 10 TABLET | Refills: 0 | Status: SHIPPED | OUTPATIENT
Start: 2022-02-26 | End: 2022-03-03

## 2022-02-26 RX ORDER — CIPROFLOXACIN 500 MG/1
TABLET, FILM COATED ORAL
COMMUNITY
Start: 2022-02-25 | End: 2022-05-06

## 2022-02-26 NOTE — PATIENT INSTRUCTIONS
Patient Education        Gout: Care Instructions  Overview     Gout is a form of arthritis caused by a buildup of uric acid crystals in a joint. It causes sudden attacks of pain, swelling, redness, and stiffness, usually in one joint, especially the big toe. Gout usually comes on without a cause. But it can be brought on by drinking alcohol (especially beer), eating or drinking things made with high-fructose corn syrup, or eating seafood or red meat. Taking certain medicines, such as diuretics, can also trigger an attack of gout. Taking your medicines as prescribed and following up with your doctor regularly can help you avoid gout attacks in the future. Follow-up care is a key part of your treatment and safety. Be sure to make and go to all appointments, and call your doctor if you are having problems. It's also a good idea to know your test results and keep a list of the medicines you take. How can you care for yourself at home? · If the joint is swollen, put ice or a cold pack on the area for 10 to 20 minutes at a time. Put a thin cloth between the ice and your skin. · Prop up the sore limb on a pillow when you ice it or anytime you sit or lie down during the next 3 days. Try to keep it above the level of your heart. This can help reduce swelling. · Rest sore joints. Avoid activities that put weight or strain on the joints for a few days. Take short rest breaks from your regular activities during the day. · Take your medicines exactly as prescribed. Call your doctor if you think you are having a problem with your medicine. · Take pain medicines exactly as directed. ? If the doctor gave you a prescription medicine for pain, take it as prescribed. ? If you are not taking a prescription pain medicine, ask your doctor if you can take an over-the-counter medicine. · Eat less seafood and red meat. · Avoid foods or drinks that are made with high-fructose corn syrup.   · Check with your doctor before drinking alcohol. · Losing weight, if you are overweight, may help reduce attacks of gout. But do not go on a diet that causes rapid weight loss. Losing a lot of weight in a short amount of time can cause a gout attack. When should you call for help? Call your doctor now or seek immediate medical care if:    · You have a fever.     · The joint is so painful you cannot use it.     · You have sudden, unexplained swelling, redness, warmth, or severe pain in one or more joints. Watch closely for changes in your health, and be sure to contact your doctor if:    · You have joint pain.     · Your symptoms get worse or are not improving after 2 or 3 days. Where can you learn more? Go to https://Excelsior Industriespe"Become, Inc[employer]eb.Acupera. org and sign in to your [employer]k account. Enter Q819 in the NUMBER26 box to learn more about \"Gout: Care Instructions. \"     If you do not have an account, please click on the \"Sign Up Now\" link. Current as of: April 30, 2021               Content Version: 13.1  © 7015-4997 [employer]. Care instructions adapted under license by [facility] ([facility]). If you have questions about a medical condition or this instruction, always ask your healthcare professional. [address] any warranty or liability for your use of this information. Patient Education        Purine-Restricted Diet: Care Instructions  Your Care Instructions     Purines are substances that are found in some foods. Your body turns purines into uric acid. High levels of uric acid can cause gout, which is a form of arthritis that causes pain and inflammation in joints. You may be able to help control the amount of uric acid in your body by limiting high-purine foods in your diet. Follow-up care is a key part of your treatment and safety. Be sure to make and go to all appointments, and call your doctor if you are having problems.  It's also a good idea to know your test results and keep a list of the medicines you take. How can you care for yourself at home? · Plan your meals and snacks around foods that are low in purines and are safe for you to eat. These foods include:  ? Green vegetables and tomatoes. ? Fruits. ? Whole-grain breads, rice, and cereals. ? Eggs, peanut butter, and nuts. ? Low-fat milk, cheese, and other milk products. ? Popcorn. ? Gelatin desserts, chocolate, cocoa, and cakes and sweets, in small amounts. · You can eat certain foods that are medium-high in purines, but eat them only once in a while. These foods include:  ? Legumes, such as dried beans and dried peas. You can have 1 cup cooked legumes each day. ? Asparagus, cauliflower, spinach, mushrooms, and green peas. ? Fish and seafood (other than very high-purine seafood). ? Oatmeal, wheat bran, and wheat germ. · Limit very high-purine foods, including:  ? Organ meats, such as liver, kidneys, sweetbreads, and brains. ? Meats, including haider, beef, pork, and lamb. ? Game meats and any other meats in large amounts. ? Anchovies, sardines, herring, mackerel, and scallops. ? Gravy. ? Beer. Where can you learn more? Go to https://UIEvolutionpepiceweb.POET Technologies. org and sign in to your Mommy Nearest account. Enter F448 in the [facility] box to learn more about \"Purine-Restricted Diet: Care Instructions. \"     If you do not have an account, please click on the \"Sign Up Now\" link. Current as of: September 8, 2021               Content Version: 13.1  © 2006-2021 Prest[employer]. Care instructions adapted under license by [facility] [employer] [facility] ([facility]). If you have questions about a medical condition or this instruction, always ask your healthcare professional. Joe Olmstead any warranty or liability for your use of this information.          Patient Education        Urinary Tract Infection (UTI) in Women: Care Instructions  Overview     A urinary tract infection, or UTI, is a general term for an infection anywhere between the kidneys and the urethra (where urine comes out). Most UTIs are bladder infections. They often cause pain or burning when you urinate. UTIs are caused by bacteria and can be cured with antibiotics. Be sure to complete your treatment so that the infection does not get worse. Follow-up care is a key part of your treatment and safety. Be sure to make and go to all appointments, and call your doctor if you are having problems. It's also a good idea to know your test results and keep a list of the medicines you take. How can you care for yourself at home? · Take your antibiotics as directed. Do not stop taking them just because you feel better. You need to take the full course of antibiotics. · Drink extra water and other fluids for the next day or two. This will help make the urine less concentrated and help wash out the bacteria that are causing the infection. (If you have kidney, heart, or liver disease and have to limit fluids, talk with your doctor before you increase the amount of fluids you drink.)  · Avoid drinks that are carbonated or have caffeine. They can irritate the bladder. · Urinate often. Try to empty your bladder each time. · To relieve pain, take a hot bath or lay a heating pad set on low over your lower belly or genital area. Never go to sleep with a heating pad in place. To prevent UTIs  · Drink plenty of water each day. This helps you urinate often, which clears bacteria from your system. (If you have kidney, heart, or liver disease and have to limit fluids, talk with your doctor before you increase the amount of fluids you drink.)  · Urinate when you need to. · If you are sexually active, urinate right after you have sex. · Change sanitary pads often. · Avoid douches, bubble baths, feminine hygiene sprays, and other feminine hygiene products that have deodorants. · After going to the bathroom, wipe from front to back. When should you call for help?    Call your doctor now or seek immediate medical care if:    · Symptoms such as fever, chills, nausea, or vomiting get worse or appear for the first time.     · You have new pain in your back just below your rib cage. This is called flank pain.     · There is new blood or pus in your urine.     · You have any problems with your antibiotic medicine. Watch closely for changes in your health, and be sure to contact your doctor if:    · You are not getting better after taking an antibiotic for 2 days.     · Your symptoms go away but then come back. Where can you learn more? Go to https://Piece of CakepeLoudeye.Intellution. org and sign in to your [employer] Power account. Enter S932 in the [facility] box to learn more about \"Urinary Tract Infection (UTI) in Women: Care Instructions. \"     If you do not have an account, please click on the \"Sign Up Now\" link. Current as of: February 10, 2021               Content Version: 13.1  © 2006-2021 Healthwise, Incorporated. Care instructions adapted under license by [facility] ([facility]). If you have questions about a medical condition or this instruction, always ask your healthcare professional. [place] [address] any warranty or liability for your use of this information.

## 2022-02-26 NOTE — PROGRESS NOTES
[place] [address] [place]  [address] [facility]  Phone: [phone]  Fax: Steve Head    Pt Name: Estelle Aguiar  MRN: [id number]  Alfredogfrodney 1955  Date of evaluation: 2/26/2022  Provider: Fiorella Parekh PA-C     CHIEF COMPLAINT       Chief Complaint   Patient presents with    Other     left big toe swollen and red. She states that she has taken 2 doses of the cipro and her toe is red and swollen- she states that she is allergic to this medication. HISTORY OF PRESENT ILLNESS  (Location/Symptom, Timing/Onset, Context/Setting, Quality, Duration, Modifying Factors, Severity.)   Estelle Aguiar is a 77 y.o. White (non-) [1] female who presents to the office for evaluation of      Toe Pain   The incident occurred 6 to 12 hours ago. The incident occurred at home. There was no injury mechanism. The pain is present in the left toes. The quality of the pain is described as aching. The pain is at a severity of 7/10. The pain is moderate. Pertinent negatives include no loss of motion, loss of sensation, numbness or tingling. She reports no foreign bodies present. The symptoms are aggravated by movement, palpation and weight bearing. Nursing Notes were reviewed. REVIEW OF SYSTEMS    (2-9 systems for level 4, 10 or more for level 5)     Review of Systems   Constitutional: Negative for chills, diaphoresis, fatigue and fever. HENT: Negative. Respiratory: Negative. Cardiovascular: Negative. Gastrointestinal: Negative. Genitourinary: Negative. Musculoskeletal: Negative. Skin: Positive for color change. Erythema noted to patient's toe. Pain with palpation. Neurological: Negative for tingling and numbness. Except as noted above the remainder of the review of systems was reviewed andnegative. PAST MEDICAL HISTORY   History reviewed.     Past Medical History:   Diagnosis Date    Arthritis     lower back and knee    Elevated cholesterol     on rx per pcp    Esophageal stricture     GERD (gastroesophageal reflux disease)     otc prilosec    Posterior vaginal wall prolapse     Snores     denies apnea    Vaginal enterocele     Wears prescription eyeglasses     Wellness examination     Altaf WARNER  last seen 8/19/2020         SURGICAL HISTORY     History reviewed. Past Surgical History:   Procedure Laterality Date    BLADDER SUSPENSION  09/10/2020    LYNX MID URETHRAL SLING INSERTION     COLONOSCOPY      2019    COLPORRHAPHY  09/10/2020    posterior colporrhaphy    DILATION AND CURETTAGE      ENDOSCOPY, COLON, DIAGNOSTIC      2019 stricture w/ dilitation .  [facility] FOOT SURGERY      left big toe hemangiomaremoved, benign    HYSTERECTOMY  02/2007    TVH withBSO , A&P repair    HYSTEROSCOPY  03/2007    novasure    LAPAROSCOPY  03/2007    BTL    URETHRAL SURGERY N/A 9/10/2020    LYNX MID URETHRAL SLING INSERTION performed by Katie Nichols MD at [facility] N/A 9/10/2020    POSTERIOR COLPORRHAPHY performed by Bing Cruz MD at [address] [place] [address]       Current Outpatient Medications   Medication Sig Dispense Refill    predniSONE (DELTASONE) 20 MG tablet Take 1 tablet by mouth 2 times daily for 5 days 10 tablet 0    cephALEXin (KEFLEX) 500 MG capsule Take 1 capsule by mouth 2 times daily for 7 days 14 capsule 0    indomethacin (INDOCIN) 25 MG capsule Take 1 capsule by mouth 3 times daily for 10 days 30 capsule 0    atorvastatin (LIPITOR) 20 MG tablet Take 1 tablet by mouth daily 30 tablet 5    ibuprofen (ADVIL;MOTRIN) 800 MG tablet Take 1 tablet by mouth every 8 hours as needed for Pain 30 tablet 0    vitamin D 25 MCG (1000 UT) CAPS Take by mouth      GLUCOSAMINE-CHONDROITIN ER PO Take by mouth daily      Ascorbic Acid (VITAMIN C) 1000 MG tablet Take 1,000 mg by mouth daily      Multiple Vitamins-Minerals (MULTIVITAMIN WOMEN 50+ PO) Take by mouth      ciprofloxacin (CIPRO) 500 MG tablet        No current facility-administered medications for this visit. ALLERGIES     Bactrim, Ciprofloxacin, Nitrofurantoin, Sulfa antibiotics, and Zocor [simvastatin]    FAMILY HISTORY           Problem Relation Age of Onset    Heart Disease Maternal Grandmother         CHF    Stroke Paternal Grandmother     Heart Disease Mother     Cancer Mother     Heart Disease Father     Cancer Father      Family Status   Relation Name Status    MGM      PGM  (Not Specified)    Mother      Father            SOCIAL HISTORY      reports that she has never smoked. She has never used smokeless tobacco. She reports that she does not drink alcohol and does not use drugs. PHYSICAL EXAM    (up to 7 for level 4, 8 or more for level 5)     Vitals:    22 1020   BP: 122/84   Site: Left Upper Arm   Position: Sitting   Cuff Size: Medium Adult   Pulse: 66   Temp: 98.2 °F (36.8 °C)   SpO2: 99%   Weight: 162 lb (73.5 kg)         Physical Exam  Vitals and nursing note reviewed. Constitutional:       General: She is not in acute distress. Appearance: Normal appearance. She is not ill-appearing. HENT:      Head: Normocephalic and atraumatic. Right Ear: External ear normal.      Left Ear: External ear normal.      Nose: Nose normal.      Mouth/Throat:      Mouth: Mucous membranes are moist.   Eyes:      Extraocular Movements: Extraocular movements intact. Conjunctiva/sclera: Conjunctivae normal.      Pupils: Pupils are equal, round, and reactive to light. Cardiovascular:      Rate and Rhythm: Normal rate. Pulmonary:      Effort: Pulmonary effort is normal.   Abdominal:      Palpations: Abdomen is soft. Musculoskeletal:        Feet:    Skin:     General: Skin is warm and dry. Neurological:      Mental Status: She is alert and oriented to person, place, and time.            DIFFERENTIAL DIAGNOSIS:         Cem Rosas reviewed the disposition diagnosis with the patient and or their family/guardian. I have answered their questions and given discharge instructions. They voiced understanding of these instructions and did not have anyfurther questions or complaints. PROCEDURES:  No orders of the defined types were placed in this encounter. No results found for this visit on 02/26/22. FINALIMPRESSION      Visit Diagnoses and Associated Orders     Acute cystitis with hematuria    -  Primary         Acute drug-induced gout involving toe of left foot             ORDERS WITHOUT AN ASSOCIATED DIAGNOSIS    ciprofloxacin (CIPRO) 500 MG tablet [44507]      predniSONE (DELTASONE) 20 MG tablet [6496]      cephALEXin (KEFLEX) 500 MG capsule [9500]      indomethacin (INDOCIN) 25 MG capsule [3897]              PLAN     No follow-ups on file. DISCHARGEMEDICATIONS:  Orders Placed This Encounter   Medications    predniSONE (DELTASONE) 20 MG tablet     Sig: Take 1 tablet by mouth 2 times daily for 5 days     Dispense:  10 tablet     Refill:  0    cephALEXin (KEFLEX) 500 MG capsule     Sig: Take 1 capsule by mouth 2 times daily for 7 days     Dispense:  14 capsule     Refill:  0    indomethacin (INDOCIN) 25 MG capsule     Sig: Take 1 capsule by mouth 3 times daily for 10 days     Dispense:  30 capsule     Refill:  0         Plan:  Patient will be switched to Keflex due to patient's reaction to the Cipro. Patient to take medication as prescribed to treat gout  After visit summary information given with gout information for education  Patient instructed to return to the office if symptoms worsen, return, or have any other concerns. Patient understands and is agreeable.          Jassi Bermeo PA-C 3/1/2022 7:44 PM

## 2022-03-01 ASSESSMENT — ENCOUNTER SYMPTOMS
COLOR CHANGE: 1
RESPIRATORY NEGATIVE: 1
GASTROINTESTINAL NEGATIVE: 1

## 2022-03-28 ENCOUNTER — OFFICE VISIT (OUTPATIENT)
Dept: PRIMARY CARE CLINIC | Age: 67
End: 2022-03-28
Payer: COMMERCIAL

## 2022-03-28 ENCOUNTER — HOSPITAL ENCOUNTER (OUTPATIENT)
Age: 67
Setting detail: SPECIMEN
Discharge: HOME | End: 2022-03-28

## 2022-03-28 VITALS
HEART RATE: 59 BPM | DIASTOLIC BLOOD PRESSURE: 84 MMHG | BODY MASS INDEX: 25.37 KG/M2 | WEIGHT: 162 LBS | OXYGEN SATURATION: 98 % | SYSTOLIC BLOOD PRESSURE: 128 MMHG | TEMPERATURE: 98.4 F

## 2022-03-28 DIAGNOSIS — R31.9 URINARY TRACT INFECTION WITH HEMATURIA, SITE UNSPECIFIED: ICD-10-CM

## 2022-03-28 DIAGNOSIS — N39.0 URINARY TRACT INFECTION WITH HEMATURIA, SITE UNSPECIFIED: ICD-10-CM

## 2022-03-28 DIAGNOSIS — R30.0 DYSURIA: Primary | ICD-10-CM

## 2022-03-28 LAB
BILIRUBIN, POC: ABNORMAL
BLOOD URINE, POC: ABNORMAL
CLARITY, POC: ABNORMAL
COLOR, POC: YELLOW
GLUCOSE URINE, POC: ABNORMAL
KETONES, POC: ABNORMAL
LEUKOCYTE EST, POC: ABNORMAL
NITRITE, POC: ABNORMAL
PH, POC: 5.5
PROTEIN, POC: 30
SPECIFIC GRAVITY, POC: 1.02
UROBILINOGEN, POC: 0.2

## 2022-03-28 PROCEDURE — 81002 URINALYSIS NONAUTO W/O SCOPE: CPT | Performed by: PHYSICIAN ASSISTANT

## 2022-03-28 PROCEDURE — 99213 OFFICE O/P EST LOW 20 MIN: CPT | Performed by: PHYSICIAN ASSISTANT

## 2022-03-28 RX ORDER — CEPHALEXIN 500 MG/1
500 CAPSULE ORAL 3 TIMES DAILY
Qty: 21 CAPSULE | Refills: 0 | Status: SHIPPED | OUTPATIENT
Start: 2022-03-28 | End: 2022-04-04

## 2022-03-28 RX ORDER — PHENAZOPYRIDINE HYDROCHLORIDE 200 MG/1
200 TABLET, FILM COATED ORAL 3 TIMES DAILY PRN
Qty: 6 TABLET | Refills: 0 | Status: SHIPPED | OUTPATIENT
Start: 2022-03-28 | End: 2022-03-30

## 2022-03-28 NOTE — PROGRESS NOTES
[place]  [address] [facility]  Phone: [phone]  Fax: Steve Head    Pt Name: Angelique Marquis  MRN: [id number]  Marlen 1955  Date of evaluation: 3/28/2022  Provider: Juanito Gray Dr       Chief Complaint   Patient presents with    Flank Pain     Flank and bladder pain. Painful urination. HISTORY OF PRESENT ILLNESS  (Location/Symptom, Timing/Onset, Context/Setting, Quality, Duration, Modifying Factors, Severity.)   Angelique Marquis is a 77 y.o. White (non-) [1] female who presents to the office for evaluation of      Urinary Tract Infection   This is a new problem. The current episode started in the past 7 days. The problem has been waxing and waning. The quality of the pain is described as aching. There has been no fever. Associated symptoms include flank pain, frequency and urgency. Pertinent negatives include no chills, nausea or vomiting. Nursing Notes were reviewed. REVIEW OF SYSTEMS    (2-9 systems for level 4, 10 or more for level 5)     Review of Systems   Constitutional: Negative for chills. HENT: Negative. Respiratory: Negative. Cardiovascular: Negative. Gastrointestinal: Negative for nausea and vomiting. Genitourinary: Positive for dysuria, flank pain, frequency and urgency. Negative for difficulty urinating, pelvic pain and vaginal bleeding. Skin: Negative. Except as noted above the remainder of the review of systems was reviewed andnegative. PAST MEDICAL HISTORY   History reviewed.     Past Medical History:   Diagnosis Date    Arthritis     lower back and knee    Elevated cholesterol     on rx per pcp    Esophageal stricture     GERD (gastroesophageal reflux disease)     otc prilosec    Posterior vaginal wall prolapse     Snores     denies apnea    Vaginal enterocele     Wears prescription eyeglasses     Wellness examination     Tavares Martin APRN  last seen 8/19/2020         SURGICAL HISTORY     History reviewed. Past Surgical History:   Procedure Laterality Date    BLADDER SUSPENSION  09/10/2020    LYNX MID URETHRAL SLING INSERTION     COLONOSCOPY      2019    COLPORRHAPHY  09/10/2020    posterior colporrhaphy    DILATION AND CURETTAGE      ENDOSCOPY, COLON, DIAGNOSTIC      2019 stricture w/ dilitation . [facility]GERY      left big toe hemangiomaremoved, benign    HYSTERECTOMY  02/2007    TVH withBSO , A&P repair    HYSTEROSCOPY  03/2007    novasure    LAPAROSCOPY  03/2007    BTL    URETHRAL SURGERY N/A 9/10/2020    LYNX MID URETHRAL SLING INSERTION performed by Janae Mendes MD at [address] [facility] N/A 9/10/2020    POSTERIOR COLPORRHAPHY performed by Godfrey Hernandez MD at [address] [facility]       Current Outpatient Medications   Medication Sig Dispense Refill    cephALEXin (KEFLEX) 500 MG capsule Take 1 capsule by mouth 3 times daily for 7 days 21 capsule 0    ciprofloxacin (CIPRO) 500 MG tablet       atorvastatin (LIPITOR) 20 MG tablet Take 1 tablet by mouth daily 30 tablet 5    ibuprofen (ADVIL;MOTRIN) 800 MG tablet Take 1 tablet by mouth every 8 hours as needed for Pain 30 tablet 0    vitamin D 25 MCG (1000 UT) CAPS Take by mouth      GLUCOSAMINE-CHONDROITIN ER PO Take by mouth daily      Ascorbic Acid (VITAMIN C) 1000 MG tablet Take 1,000 mg by mouth daily      Multiple Vitamins-Minerals (MULTIVITAMIN WOMEN 50+ PO) Take by mouth      indomethacin (INDOCIN) 25 MG capsule Take 1 capsule by mouth 3 times daily for 10 days 30 capsule 0     No current facility-administered medications for this visit.          ALLERGIES     Bactrim, Ciprofloxacin, Nitrofurantoin, Sulfa antibiotics, and Zocor [simvastatin]    FAMILY HISTORY           Problem Relation Age of Onset    Heart Disease Maternal Grandmother         CHF    Stroke Paternal Grandmother     Heart Disease Mother     Cancer Mother  Heart Disease Father     Cancer Father      Family Status   Relation Name Status    MGM      PGM  (Not Specified)    Mother      Father            SOCIAL HISTORY      reports that she has never smoked. She has never used smokeless tobacco. She reports that she does not drink alcohol and does not use drugs. PHYSICAL EXAM    (up to 7 for level 4, 8 or more for level 5)     Vitals:    22 1135   BP: 128/84   Pulse: 59   Temp: 98.4 °F (36.9 °C)   SpO2: 98%   Weight: 162 lb (73.5 kg)         Physical Exam  Vitals and nursing note reviewed. Constitutional:       General: She is not in acute distress. Appearance: Normal appearance. She is not ill-appearing. HENT:      Head: Normocephalic and atraumatic. Right Ear: External ear normal.      Left Ear: External ear normal.      Nose: Nose normal.      Mouth/Throat:      Mouth: Mucous membranes are moist.   Eyes:      Extraocular Movements: Extraocular movements intact. Pupils: Pupils are equal, round, and reactive to light. Pulmonary:      Effort: Pulmonary effort is normal.   Abdominal:      Palpations: Abdomen is soft. Tenderness: There is abdominal tenderness in the suprapubic area. Skin:     General: Skin is warm and dry. Neurological:      Mental Status: She is alert. DIFFERENTIAL DIAGNOSIS:         Shiela Norris reviewed the disposition diagnosis with the patient and or their family/guardian. I have answered their questions and given discharge instructions. They voiced understanding of these instructions and did not have anyfurther questions or complaints. PROCEDURES:  Orders Placed This Encounter   Procedures    Culture, Urine     Standing Status:   Future     Number of Occurrences:   1     Standing Expiration Date:   3/28/2023     Order Specific Question:   Specify (ex-cath, midstream, cysto, etc)?      Answer:   midstream    POCT Urinalysis no Micro       Results for orders placed or performed in visit on 03/28/22   POCT Urinalysis no Micro   Result Value Ref Range    Color, UA yellow     Clarity, UA cloudy     Glucose, UA POC neg     Bilirubin, UA neg     Ketones, UA neg     Spec Grav, UA 1.020     Blood, UA POC moderate     pH, UA 5.5     Protein, UA POC 30     Urobilinogen, UA 0.2     Leukocytes, UA large     Nitrite, UA pos        FINALIMPRESSION      Visit Diagnoses and Associated Orders     Dysuria    -  Primary    POCT Urinalysis no Micro [05067 Custom]      Culture, Urine [20730 Custom]   - Future Order         Urinary tract infection with hematuria, site unspecified             ORDERS WITHOUT AN ASSOCIATED DIAGNOSIS    cephALEXin (KEFLEX) 500 MG capsule [9500]      phenazopyridine (PYRIDIUM) 200 MG tablet [6194]              PLAN     Return if symptoms worsen or fail to improve. DISCHARGEMEDICATIONS:  Orders Placed This Encounter   Medications    cephALEXin (KEFLEX) 500 MG capsule     Sig: Take 1 capsule by mouth 3 times daily for 7 days     Dispense:  21 capsule     Refill:  0    phenazopyridine (PYRIDIUM) 200 MG tablet     Sig: Take 1 tablet by mouth 3 times daily as needed for Pain     Dispense:  6 tablet     Refill:  0         Plan:  Specimen sent for a culture. Possible treatment alteration based on the results. Patient instructed to complete entire antibiotic course. Increase fluid intake. Educational materials regarding UTI given on AVS.  Patient agreeable to treatment plan. Follow up if symptoms do not improve/worsen. Patient Instructed to return to the office if symptoms worsen, return, or have any other concerns. Patient understands and is agreeable.          Tab La PA-C 4/1/2022 6:11 PM

## 2022-03-28 NOTE — PATIENT INSTRUCTIONS
Patient Education        Urinary Tract Infection (UTI) in Women: Care Instructions  Overview     A urinary tract infection, or UTI, is a general term for an infection anywhere between the kidneys and the urethra (where urine comes out). Most UTIs are bladder infections. They often cause pain or burning when you urinate. UTIs are caused by bacteria and can be cured with antibiotics. Be sure to complete your treatment so that the infection does not get worse. Follow-up care is a key part of your treatment and safety. Be sure to make and go to all appointments, and call your doctor if you are having problems. It's also a good idea to know your test results and keep a list of the medicines you take. How can you care for yourself at home? · Take your antibiotics as directed. Do not stop taking them just because you feel better. You need to take the full course of antibiotics. · Drink extra water and other fluids for the next day or two. This will help make the urine less concentrated and help wash out the bacteria that are causing the infection. (If you have kidney, heart, or liver disease and have to limit fluids, talk with your doctor before you increase the amount of fluids you drink.)  · Avoid drinks that are carbonated or have caffeine. They can irritate the bladder. · Urinate often. Try to empty your bladder each time. · To relieve pain, take a hot bath or lay a heating pad set on low over your lower belly or genital area. Never go to sleep with a heating pad in place. To prevent UTIs  · Drink plenty of water each day. This helps you urinate often, which clears bacteria from your system. (If you have kidney, heart, or liver disease and have to limit fluids, talk with your doctor before you increase the amount of fluids you drink.)  · Urinate when you need to. · If you are sexually active, urinate right after you have sex. · Change sanitary pads often.   · Avoid douches, bubble baths, feminine hygiene sprays, and other feminine hygiene products that have deodorants. · After going to the bathroom, wipe from front to back. When should you call for help? Call your doctor now or seek immediate medical care if:    · Symptoms such as fever, chills, nausea, or vomiting get worse or appear for the first time.     · You have new pain in your back just below your rib cage. This is called flank pain.     · There is new blood or pus in your urine.     · You have any problems with your antibiotic medicine. Watch closely for changes in your health, and be sure to contact your doctor if:    · You are not getting better after taking an antibiotic for 2 days.     · Your symptoms go away but then come back. Where can you learn more? Go to https://OKCoin.Phoodeez. org and sign in to your [employer] account. Enter B712 in the [employer] box to learn more about \"Urinary Tract Infection (UTI) in Women: Care Instructions. \"     If you do not have an account, please click on the \"Sign Up Now\" link. Current as of: February 10, 2021               Content Version: 13.1  © 9612-6456 [employer]. Care instructions adapted under license by [facility] ([facility]). If you have questions about a medical condition or this instruction, always ask your healthcare professional. [place] [address] any warranty or liability for your use of this information. Patient Education        Painful Urination (Dysuria): Care Instructions  Your Care Instructions  Burning pain with urination (dysuria) is a common symptom of a urinary tract infection or other urinary problems. The bladder may become inflamed. This can cause pain when the bladder fills and empties. You may also feel pain if the tube that carries urine from the bladder to the outside of the body (urethra) gets irritated or infected. Sexually transmitted infections (STIs) also may cause pain when you urinate.   Sometimes the pain can be caused by things other than an infection. The urethra can be irritated by soaps, perfumes, or foreign objects in the urethra. Kidney stones can cause pain when they pass through the urethra. The cause may be hard to find. You may need tests. Treatment for painful urination depends on the cause. Follow-up care is a key part of your treatment and safety. Be sure to make and go to all appointments, and call your doctor if you are having problems. It's also a good idea to know your test results and keep a list of the medicines you take. How can you care for yourself at home? · Drink extra water for the next day or two. This will help make the urine less concentrated. (If you have kidney, heart, or liver disease and have to limit fluids, talk with your doctor before you increase the amount of fluids you drink.)  · Avoid drinks that are carbonated or have caffeine. They can irritate the bladder. · Urinate often. Try to empty your bladder each time. For women:  · Urinate right after you have sex. · After going to the bathroom, wipe from front to back. · Avoid douches, bubble baths, and feminine hygiene sprays. And avoid other feminine hygiene products that have deodorants. When should you call for help? Call your doctor now or seek immediate medical care if:    · You have new symptoms, such as fever, nausea, or vomiting.     · You have new or worse symptoms of a urinary problem. For example:  ? You have blood or pus in your urine. ? You have chills or body aches. ? It hurts worse to urinate. ? You have groin or belly pain. ? You have pain in your back just below your rib cage (the flank area). Watch closely for changes in your health, and be sure to contact your doctor if you have any problems. Where can you learn more? Go to https://shay.[employer]utenberg Technology. org and sign in to your [employer] account.  Enter G927 in the [employer] box to learn more about \"Painful Urination (Dysuria): Care Instructions. \"     If you do not have an account, please click on the \"Sign Up Now\" link. Current as of: February 10, 2021               Content Version: 13.1  © 3662-2137 [employer] Inc[employer]. Care instructions adapted under license by [facility] ([facility]). If you have questions about a medical condition or this instruction, always ask your healthcare professional. [address] any warranty or liability for your use of this information.

## 2022-03-29 DIAGNOSIS — R30.0 DYSURIA: ICD-10-CM

## 2022-03-30 LAB
CULTURE: ABNORMAL
SPECIMEN DESCRIPTION: ABNORMAL

## 2022-04-01 ASSESSMENT — ENCOUNTER SYMPTOMS
RESPIRATORY NEGATIVE: 1
NAUSEA: 0
VOMITING: 0

## 2022-04-07 DIAGNOSIS — E78.5 HYPERLIPIDEMIA, UNSPECIFIED HYPERLIPIDEMIA TYPE: ICD-10-CM

## 2022-04-07 NOTE — TELEPHONE ENCOUNTER
----- Message from Yenifer Jerry sent at 4/7/2022  2:48 PM EDT -----  Subject: Refill Request    QUESTIONS  Name of Medication? atorvastatin (LIPITOR) 20 MG tablet  Patient-reported dosage and instructions? Take 1 tablet by mouth daily  How many days do you have left? 2  Preferred Pharmacy? [id number] [facility] phone number (if available)? [phone]  ---------------------------------------------------------------------------  --------------  CALL BACK INFO  What is the best way for the office to contact you? OK to leave message on   voicemail  Preferred Call Back Phone Number? [phone]  ---------------------------------------------------------------------------  --------------  SCRIPT ANSWERS  Relationship to Patient?  Self

## 2022-04-08 RX ORDER — ATORVASTATIN CALCIUM 20 MG/1
20 TABLET, FILM COATED ORAL DAILY
Qty: 90 TABLET | Refills: 1 | Status: SHIPPED | OUTPATIENT
Start: 2022-04-08 | End: 2022-08-11 | Stop reason: SDUPTHER

## 2022-05-06 ENCOUNTER — HOSPITAL ENCOUNTER (OUTPATIENT)
Age: 67
Setting detail: SPECIMEN
Discharge: HOME | End: 2022-05-06

## 2022-05-06 ENCOUNTER — OFFICE VISIT (OUTPATIENT)
Dept: PRIMARY CARE CLINIC | Age: 67
End: 2022-05-06
Payer: COMMERCIAL

## 2022-05-06 VITALS
DIASTOLIC BLOOD PRESSURE: 80 MMHG | TEMPERATURE: 96.9 F | HEART RATE: 69 BPM | SYSTOLIC BLOOD PRESSURE: 116 MMHG | BODY MASS INDEX: 25.37 KG/M2 | WEIGHT: 162 LBS | OXYGEN SATURATION: 96 %

## 2022-05-06 DIAGNOSIS — R30.0 DYSURIA: ICD-10-CM

## 2022-05-06 DIAGNOSIS — R30.0 DYSURIA: Primary | ICD-10-CM

## 2022-05-06 LAB
BILIRUBIN, POC: ABNORMAL
BLOOD URINE, POC: ABNORMAL
CLARITY, POC: ABNORMAL
COLOR, POC: YELLOW
GLUCOSE URINE, POC: ABNORMAL
KETONES, POC: ABNORMAL
LEUKOCYTE EST, POC: ABNORMAL
NITRITE, POC: ABNORMAL
PH, POC: 5
PROTEIN, POC: 30
SPECIFIC GRAVITY, POC: 1.03
UROBILINOGEN, POC: 0.2

## 2022-05-06 PROCEDURE — 1036F TOBACCO NON-USER: CPT | Performed by: FAMILY MEDICINE

## 2022-05-06 PROCEDURE — 1090F PRES/ABSN URINE INCON ASSESS: CPT | Performed by: FAMILY MEDICINE

## 2022-05-06 PROCEDURE — 1123F ACP DISCUSS/DSCN MKR DOCD: CPT | Performed by: FAMILY MEDICINE

## 2022-05-06 PROCEDURE — 4040F PNEUMOC VAC/ADMIN/RCVD: CPT | Performed by: FAMILY MEDICINE

## 2022-05-06 PROCEDURE — 81003 URINALYSIS AUTO W/O SCOPE: CPT | Performed by: FAMILY MEDICINE

## 2022-05-06 PROCEDURE — 99213 OFFICE O/P EST LOW 20 MIN: CPT | Performed by: FAMILY MEDICINE

## 2022-05-06 PROCEDURE — G8427 DOCREV CUR MEDS BY ELIG CLIN: HCPCS | Performed by: FAMILY MEDICINE

## 2022-05-06 PROCEDURE — 3017F COLORECTAL CA SCREEN DOC REV: CPT | Performed by: FAMILY MEDICINE

## 2022-05-06 PROCEDURE — G8417 CALC BMI ABV UP PARAM F/U: HCPCS | Performed by: FAMILY MEDICINE

## 2022-05-06 PROCEDURE — G8400 PT W/DXA NO RESULTS DOC: HCPCS | Performed by: FAMILY MEDICINE

## 2022-05-06 RX ORDER — PHENAZOPYRIDINE HYDROCHLORIDE 200 MG/1
200 TABLET, FILM COATED ORAL 3 TIMES DAILY PRN
Qty: 9 TABLET | Refills: 0 | Status: SHIPPED | OUTPATIENT
Start: 2022-05-06 | End: 2022-05-09

## 2022-05-06 RX ORDER — CEPHALEXIN 500 MG/1
500 CAPSULE ORAL 3 TIMES DAILY
Qty: 30 CAPSULE | Refills: 0 | Status: SHIPPED | OUTPATIENT
Start: 2022-05-06 | End: 2022-05-16

## 2022-05-06 NOTE — PROGRESS NOTES
Subjective:  Sabrina Care presents for   Chief Complaint   Patient presents with    Urinary Tract Infection     sx onset on/off for 2 weeks; painful and itchy urination       No fevers. No cva pain    Fluids are good  Had diarrhea when this started. Drinks about 80+ ounces    She has had more than 6 utii's in the past year. Gets a fast heart rate with macrobid, bactrim and cipro. She rmembers breaking out with cipro. Will be seeing her gyne in about 2 weeks- who did pelvic surgery on her. Denies any systemic sx    Has a good fluid intake  Patient Active Problem List   Diagnosis    Rectocele    Stress incontinence    Posterior colporrhaphy 9/10/2020    Post-op pain    Hyperlipidemia    Gastroesophageal reflux disease without esophagitis    Urinary tract infection, site not specified    Prolapse of vaginal cuff after hysterectomy    Presence of pessary       Objective:  Physical Exam   Vitals: Wt Readings from Last 3 Encounters:   05/06/22 162 lb (73.5 kg)   03/28/22 162 lb (73.5 kg)   02/26/22 162 lb (73.5 kg)     Ht Readings from Last 3 Encounters:   02/23/22 5' 7\" (1.702 m)   12/07/21 5' 7\" (1.702 m)   11/09/21 5' 7\" (1.702 m)     Body mass index is 25.37 kg/m². Vitals:    05/06/22 1557   BP: 116/80   Site: Left Upper Arm   Pulse: 69   Temp: 96.9 °F (36.1 °C)   SpO2: 96%   Weight: 162 lb (73.5 kg)       Constitutional: She is oriented to person, place, and time. She appears well-developed and well-nourished and in no acute distress. Answers all my questions appropriately. Heart: RRR without murmur. No S3, S4, or gallop noted. Chest:   Good breath sounds noted. Clear to auscultation bilaterally. No rales, wheezes, or rhonchi noted. No respiratory retractions noted. Wall has symmetrical movement with respirations. No cva tenderness noted    Abdomen: No distension noted.  + bowel sounds in all quadrants which are normoactive. No bruits noted. No masses could be palpated. No unusual pulsatile masses noted. To deep palpation, patient denied any significant pain. No rebound, guarding or rigidity noted to my exam.        UA:Recent Labs     05/06/22  1605   NITRITE pos   COLORU yellow   PHUR 5.0   CLARITYU cloudy   SPECGRAV 1.030   LEUKOCYTESUR moderate   BILIRUBINUR neg   BLOODU moderate   GLUCOSEU neg       Assessment:   Encounter Diagnosis   Name Primary?  Dysuria Yes         Plan:     Medications Discontinued During This Encounter   Medication Reason    ciprofloxacin (CIPRO) 500 MG tablet      THE ABOVE NOTED DISCONTINUED MEDS MAY ONLY BE FROM 'CLEANING UP' THE MED LIST AND WERE NOT ACTUALLY CANCELED;  SEE CHART FOR DETAILS! Orders Placed This Encounter   Medications    cephALEXin (KEFLEX) 500 MG capsule     Sig: Take 1 capsule by mouth 3 times daily for 10 days     Dispense:  30 capsule     Refill:  0    phenazopyridine (PYRIDIUM) 200 MG tablet     Sig: Take 1 tablet by mouth 3 times daily as needed for Pain     Dispense:  9 tablet     Refill:  0     Orders Placed This Encounter   Procedures    Culture, Urine     Standing Status:   Future     Standing Expiration Date:   5/6/2023     Order Specific Question:   Specify (ex-cath, midstream, cysto, etc)? Answer:   midstream    POCT Urinalysis No Micro (Auto)     Return in about 2 weeks (around 5/20/2022). There are no Patient Instructions on file for this visit.   Follow up with your provider        Push fluids    Follow up with gyne as scheduled

## 2022-05-08 LAB
CULTURE: ABNORMAL
SPECIMEN DESCRIPTION: ABNORMAL

## 2022-05-18 ENCOUNTER — TELEPHONE (OUTPATIENT)
Dept: OBGYN CLINIC | Age: 67
End: 2022-05-18

## 2022-05-18 NOTE — TELEPHONE ENCOUNTER
LVM letting pt know she needs to see urology for her urinary issues. Advised pt we could place a referral to Urology if she needs one.

## 2022-06-06 ENCOUNTER — HOSPITAL ENCOUNTER (OUTPATIENT)
Age: 67
Setting detail: SPECIMEN
Discharge: HOME | End: 2022-06-06

## 2022-06-06 ENCOUNTER — OFFICE VISIT (OUTPATIENT)
Dept: PRIMARY CARE CLINIC | Age: 67
End: 2022-06-06
Payer: COMMERCIAL

## 2022-06-06 VITALS
BODY MASS INDEX: 26.02 KG/M2 | WEIGHT: 166.13 LBS | OXYGEN SATURATION: 98 % | TEMPERATURE: 98.7 F | SYSTOLIC BLOOD PRESSURE: 120 MMHG | DIASTOLIC BLOOD PRESSURE: 82 MMHG | HEART RATE: 64 BPM

## 2022-06-06 DIAGNOSIS — N39.0 RECURRENT UTI: ICD-10-CM

## 2022-06-06 DIAGNOSIS — R30.0 DYSURIA: ICD-10-CM

## 2022-06-06 DIAGNOSIS — R11.0 NAUSEA: ICD-10-CM

## 2022-06-06 DIAGNOSIS — J02.9 SORE THROAT: ICD-10-CM

## 2022-06-06 DIAGNOSIS — R30.0 DYSURIA: Primary | ICD-10-CM

## 2022-06-06 DIAGNOSIS — R52 BODY ACHES: ICD-10-CM

## 2022-06-06 LAB
BILIRUBIN, POC: ABNORMAL
BLOOD URINE, POC: ABNORMAL
CLARITY, POC: CLEAR
COLOR, POC: YELLOW
GLUCOSE URINE, POC: ABNORMAL
KETONES, POC: ABNORMAL
LEUKOCYTE EST, POC: ABNORMAL
NITRITE, POC: ABNORMAL
PH, POC: 6.5
PROTEIN, POC: 30
S PYO AG THROAT QL: NORMAL
SPECIFIC GRAVITY, POC: 1.02
UROBILINOGEN, POC: 0.2

## 2022-06-06 PROCEDURE — 99214 OFFICE O/P EST MOD 30 MIN: CPT | Performed by: PHYSICIAN ASSISTANT

## 2022-06-06 PROCEDURE — 1123F ACP DISCUSS/DSCN MKR DOCD: CPT | Performed by: PHYSICIAN ASSISTANT

## 2022-06-06 PROCEDURE — 81003 URINALYSIS AUTO W/O SCOPE: CPT | Performed by: PHYSICIAN ASSISTANT

## 2022-06-06 PROCEDURE — 87880 STREP A ASSAY W/OPTIC: CPT | Performed by: PHYSICIAN ASSISTANT

## 2022-06-06 PROCEDURE — 96372 THER/PROPH/DIAG INJ SC/IM: CPT | Performed by: PHYSICIAN ASSISTANT

## 2022-06-06 RX ORDER — CEFTRIAXONE 1 G/1
1000 INJECTION, POWDER, FOR SOLUTION INTRAMUSCULAR; INTRAVENOUS ONCE
Status: COMPLETED | OUTPATIENT
Start: 2022-06-06 | End: 2022-06-06

## 2022-06-06 RX ORDER — ONDANSETRON 4 MG/1
4 TABLET, ORALLY DISINTEGRATING ORAL EVERY 8 HOURS PRN
Qty: 20 TABLET | Refills: 0 | Status: SHIPPED | OUTPATIENT
Start: 2022-06-06 | End: 2022-07-28 | Stop reason: ALTCHOICE

## 2022-06-06 RX ORDER — ONDANSETRON 4 MG/1
4 TABLET, ORALLY DISINTEGRATING ORAL ONCE
Status: COMPLETED | OUTPATIENT
Start: 2022-06-06 | End: 2022-06-06

## 2022-06-06 RX ORDER — CEPHALEXIN 500 MG/1
500 CAPSULE ORAL 3 TIMES DAILY
Qty: 21 CAPSULE | Refills: 0 | Status: SHIPPED | OUTPATIENT
Start: 2022-06-07 | End: 2022-06-14

## 2022-06-06 RX ORDER — LACTOBACILLUS RHAMNOSUS GG 10B CELL
1 CAPSULE ORAL DAILY
Qty: 30 CAPSULE | Refills: 3 | Status: SHIPPED | OUTPATIENT
Start: 2022-06-06

## 2022-06-06 RX ADMIN — ONDANSETRON 4 MG: 4 TABLET, ORALLY DISINTEGRATING ORAL at 09:27

## 2022-06-06 RX ADMIN — CEFTRIAXONE 1000 MG: 1 INJECTION, POWDER, FOR SOLUTION INTRAMUSCULAR; INTRAVENOUS at 09:29

## 2022-06-06 ASSESSMENT — ENCOUNTER SYMPTOMS
SORE THROAT: 1
NAUSEA: 1
RESPIRATORY NEGATIVE: 1
VOMITING: 0

## 2022-06-06 NOTE — PROGRESS NOTES
[place] [address] [place]  [address] [facility]  Phone: [phone]  Fax: Steve Head    Pt Name: Domingo Arita  MRN: [id number]  Alfredogfrodney 1955  Date of evaluation: 6/6/2022  Provider: Meghann Hutson, [address] [place] [address]       Chief Complaint   Patient presents with    Dysuria     currently seeing urologist-reoccurring utis-current onset x 2 weeks, had been trying suggestions made from urologist to no relief. pt states they may be doing     Pharyngitis     onset this am    Generalized Body Aches    Nausea     this am           HISTORY OF PRESENT ILLNESS  (Location/Symptom, Timing/Onset, Context/Setting, Quality, Duration, Modifying Factors, Severity.)   Domingo Arita is a 79 y.o. White (non-) [1] female who presents to the office for evaluation of      Urinary Tract Infection   This is a new problem. The current episode started 1 to 4 weeks ago. The problem has been gradually worsening. The quality of the pain is described as burning. The pain is mild. There has been no fever. Associated symptoms include chills, frequency, hesitancy, nausea and urgency. Pertinent negatives include no discharge, flank pain or vomiting. She has tried increased fluids for the symptoms. Her past medical history is significant for recurrent UTIs. Pharyngitis  The current episode started today. Associated symptoms include chills, congestion, fatigue, myalgias, nausea and a sore throat. Pertinent negatives include no fever, headaches or vomiting. The symptoms are aggravated by drinking, eating and swallowing. Nursing Notes were reviewed. REVIEW OF SYSTEMS    (2-9 systems for level 4, 10 or more for level 5)     Review of Systems   Constitutional: Positive for chills and fatigue. Negative for fever. HENT: Positive for congestion and sore throat. Negative for ear discharge and ear pain. Respiratory: Negative. Cardiovascular: Negative. Gastrointestinal: Positive for nausea. Negative for vomiting. Genitourinary: Positive for dysuria, frequency, hesitancy and urgency. Negative for flank pain. Musculoskeletal: Positive for myalgias. Skin: Negative. Neurological: Negative for headaches. Except as noted above the remainder of the review of systems was reviewed andnegative. PAST MEDICAL HISTORY   History reviewed. Past Medical History:   Diagnosis Date    Arthritis     lower back and knee    Elevated cholesterol     on rx per pcp    Esophageal stricture     GERD (gastroesophageal reflux disease)     otc prilosec    Posterior vaginal wall prolapse     Snores     denies apnea    Vaginal enterocele     Wears prescription eyeglasses     Wellness examination     Ligia Riding APRN  last seen 8/19/2020         SURGICAL HISTORY     History reviewed. Past Surgical History:   Procedure Laterality Date    BLADDER SUSPENSION  09/10/2020    LYNX MID URETHRAL SLING INSERTION     COLONOSCOPY      2019    COLPORRHAPHY  09/10/2020    posterior colporrhaphy    DILATION AND CURETTAGE      ENDOSCOPY, COLON, DIAGNOSTIC      2019 stricture w/ dilitation .  [facility] FOOT SURGERY      left big toe hemangiomaremoved, benign    HYSTERECTOMY  02/2007    TVH withBSO , A&P repair    HYSTEROSCOPY  03/2007    novasure    LAPAROSCOPY  03/2007    BTL    URETHRAL SURGERY N/A 9/10/2020    LYNX MID URETHRAL SLING INSERTION performed by Sae Aguilera MD at [address] [place] [address] N/A 9/10/2020    POSTERIOR COLPORRHAPHY performed by Alexandrea Osullivan MD at [facility]       Current Outpatient Medications   Medication Sig Dispense Refill    [START ON 6/7/2022] cephALEXin (KEFLEX) 500 MG capsule Take 1 capsule by mouth 3 times daily for 7 days 21 capsule 0    Lactobacillus (CULTURELLE DIGESTIVE WOMENS) CAPS Take 1 capsule by mouth daily 30 capsule 3    atorvastatin (LIPITOR) 20 MG tablet Take 1 tablet by mouth daily 90 tablet 1    indomethacin (INDOCIN) 25 MG capsule Take 1 capsule by mouth 3 times daily for 10 days 30 capsule 0    ibuprofen (ADVIL;MOTRIN) 800 MG tablet Take 1 tablet by mouth every 8 hours as needed for Pain 30 tablet 0    vitamin D 25 MCG (1000 UT) CAPS Take by mouth      GLUCOSAMINE-CHONDROITIN ER PO Take by mouth daily      Ascorbic Acid (VITAMIN C) 1000 MG tablet Take 1,000 mg by mouth daily      Multiple Vitamins-Minerals (MULTIVITAMIN WOMEN 50+ PO) Take by mouth       Current Facility-Administered Medications   Medication Dose Route Frequency Provider Last Rate Last Admin    cefTRIAXone (ROCEPHIN) injection 1,000 mg  1,000 mg IntraMUSCular Once Margy Aase, PA-C             ALLERGIES     Bactrim, Ciprofloxacin, Nitrofurantoin, Sulfa antibiotics, and Zocor [simvastatin]    FAMILY HISTORY           Problem Relation Age of Onset    Heart Disease Maternal Grandmother         CHF    Stroke Paternal Grandmother     Heart Disease Mother     Cancer Mother     Heart Disease Father     Cancer Father      Family Status   Relation Name Status    MGM      PGM  (Not Specified)    Mother      Father            SOCIAL HISTORY      reports that she has never smoked. She has never used smokeless tobacco. She reports that she does not drink alcohol and does not use drugs. PHYSICAL EXAM    (up to 7 for level 4, 8 or more for level 5)     Vitals:    22 0847   BP: 120/82   Site: Left Upper Arm   Pulse: 64   Temp: 98.7 °F (37.1 °C)   SpO2: 98%   Weight: 166 lb 2 oz (75.4 kg)         Physical Exam  Vitals and nursing note reviewed. Constitutional:       General: She is not in acute distress. Appearance: Normal appearance. She is not ill-appearing. HENT:      Head: Normocephalic and atraumatic. Right Ear: External ear normal.      Left Ear: External ear normal.      Nose: Congestion present.       Mouth/Throat:      Mouth: Mucous membranes are moist. Eyes:      Extraocular Movements: Extraocular movements intact. Conjunctiva/sclera: Conjunctivae normal.      Pupils: Pupils are equal, round, and reactive to light. Cardiovascular:      Rate and Rhythm: Normal rate. Pulmonary:      Effort: Pulmonary effort is normal.   Abdominal:      Palpations: Abdomen is soft. Skin:     General: Skin is warm and dry. Neurological:      Mental Status: She is alert and oriented to person, place, and time. DIFFERENTIAL DIAGNOSIS:       Mony Cano reviewed the disposition diagnosis with the patient and or their family/guardian. I have answered their questions and given discharge instructions. They voiced understanding of these instructions and did not have anyfurther questions or complaints. PROCEDURES:  Orders Placed This Encounter   Procedures    Culture, Urine     Standing Status:   Future     Standing Expiration Date:   6/6/2023     Order Specific Question:   Specify (ex-cath, midstream, cysto, etc)? Answer:   midstream    COVID-19     Standing Status:   Future     Standing Expiration Date:   6/6/2023     Scheduling Instructions:      1) Due to current limited availability of the COVID-19 test, tests will be prioritized based on responses to questions above. Testing may be delayed due to volume. 2) Print and instruct patient to adhere to CDC home isolation program. (Link Above)              3) Set up or refer patient for a monitoring program.              4) Have patient sign up for and leverage MyChart (if not previously done). Order Specific Question:   Is this test for diagnosis or screening? Answer:   Diagnosis of ill patient     Order Specific Question:   Symptomatic for COVID-19 as defined by CDC? Answer:   Yes     Order Specific Question:   Date of Symptom Onset     Answer:   6/6/2022     Order Specific Question:   Hospitalized for COVID-19?      Answer:   No     Order Specific Question:   Admitted to ICU for COVID-19? Answer:   No     Order Specific Question:   Employed in healthcare setting? Answer:   No     Order Specific Question:   Resident in a congregate (group) care setting? Answer:   No     Order Specific Question:   Pregnant? Answer:   No     Order Specific Question:   Previously tested for COVID-19? Answer: Yes    POCT Urinalysis No Micro (Auto)    POCT rapid strep A       No results found for this visit on 06/06/22. [address]      Visit Diagnoses and Associated Orders     Dysuria    -  Primary    POCT Urinalysis No Micro (Auto) [15309 Custom]      POCT rapid strep A [81914 Custom]      COVID-19 [[id number] Custom]   - Future Order    Culture, Urine [[id number] Custom]   - Future Order         Sore throat        POCT Urinalysis No Micro (Auto) [29963 Custom]      POCT rapid strep A [65700 Custom]      COVID-19 [[id number] Custom]   - Future Order    Culture, Urine [[id number] Custom]   - Future Order         Body aches        POCT Urinalysis No Micro (Auto) [78165 Custom]      POCT rapid strep A [71997 Custom]      COVID-19 [[id number] Custom]   - Future Order    Culture, Urine [[id number] Custom]   - Future Order         Nausea        POCT Urinalysis No Micro (Auto) [05306 Custom]      POCT rapid strep A [74458 Custom]      COVID-19 [[id number] Custom]   - Future Order    Culture, Urine [[id number] Custom]   - Future Order         Recurrent UTI             ORDERS WITHOUT AN ASSOCIATED DIAGNOSIS    cephALEXin (KEFLEX) 500 MG capsule [8160]      cefTRIAXone (ROCEPHIN) injection 1,000 mg [4787]      Lactobacillus (CULTURELLE DIGESTIVE WOMENS) CAPS [325739]              PLAN     Return if symptoms worsen or fail to improve.       DISCHARGEMEDICATIONS:  Orders Placed This Encounter   Medications    cephALEXin (KEFLEX) 500 MG capsule     Sig: Take 1 capsule by mouth 3 times daily for 7 days     Dispense:  21 capsule     Refill:  0    cefTRIAXone (ROCEPHIN) injection 1,000 mg     Order Specific Question: Antimicrobial Indications     Answer:   Urinary Tract Infection     Order Specific Question:   UTI duration of therapy     Answer:   7 days     Order Specific Question:   Suspected Organism(s)     Answer:   e-coli    Lactobacillus (CULTURELLE DIGESTIVE WOMENS) CAPS     Sig: Take 1 capsule by mouth daily     Dispense:  30 capsule     Refill:  3         Plan:  Specimen sent for a culture. Possible treatment alteration based on the results. Patient instructed to complete entire antibiotic course. Increase fluid intake. Educational materials regarding UTI given on AVS.  Patient agreeable to treatment plan. Follow up if symptoms do not improve/worsen. Specimen sent for a culture. Possible treatment alteration based on the results. I believe that this is likely a viral illness based on the physical exam findings. Tylenol/Motrin for fever/discomfort. Patient agreeable to treatment plan. Educational materials provided on AVS.  Follow up if symptoms do not improve/worsen. Patient instructed to return to the office if symptoms worsen, return, or have any other concerns. Patient understands and is agreeable.          Tab La PA-C 6/6/2022 9:13 AM

## 2022-06-06 NOTE — LETTER
[place] [address]  [address] [place] [address]  Phone: [phone]  Fax: [phone]    Ty Rubin, [place]        June 6, 2022     Patient: Ty Zuniga   YOB: 1955   Date of Visit: 6/6/2022       To Whom it May Concern:    Renetta Coulter was seen in my clinic on 6/6/2022. She is to remain off work pending her [facility] results     If you have any questions or concerns, please don't hesitate to call.     Sincerely,         Ty Rubin PA-C

## 2022-06-06 NOTE — PATIENT INSTRUCTIONS
Patient Education        Urinary Tract Infection (UTI) in Women: Care Instructions  Overview     A urinary tract infection, or UTI, is a general term for an infection anywhere between the kidneys and the urethra (where urine comes out). Most UTIs arebladder infections. They often cause pain or burning when you urinate. UTIs are caused by bacteria and can be cured with antibiotics. Be sure tocomplete your treatment so that the infection does not get worse. Follow-up care is a key part of your treatment and safety. Be sure to make and go to all appointments, and call your doctor if you are having problems. It's also a good idea to know your test results and keep alist of the medicines you take. How can you care for yourself at home?  Take your antibiotics as directed. Do not stop taking them just because you feel better. You need to take the full course of antibiotics.  Drink extra water and other fluids for the next day or two. This will help make the urine less concentrated and help wash out the bacteria that are causing the infection. (If you have kidney, heart, or liver disease and have to limit fluids, talk with your doctor before you increase the amount of fluids you drink.)   Avoid drinks that are carbonated or have caffeine. They can irritate the bladder.  Urinate often. Try to empty your bladder each time.  To relieve pain, take a hot bath or lay a heating pad set on low over your lower belly or genital area. Never go to sleep with a heating pad in place. To prevent UTIs   Drink plenty of water each day. This helps you urinate often, which clears bacteria from your system. (If you have kidney, heart, or liver disease and have to limit fluids, talk with your doctor before you increase the amount of fluids you drink.)   Urinate when you need to.  If you are sexually active, urinate right after you have sex.  Change sanitary pads often.    Avoid douches, bubble baths, feminine hygiene sprays, and other feminine hygiene products that have deodorants.  After going to the bathroom, wipe from front to back. When should you call for help? Call your doctor now or seek immediate medical care if:     Symptoms such as fever, chills, nausea, or vomiting get worse or appear for the first time.      You have new pain in your back just below your rib cage. This is called flank pain.      There is new blood or pus in your urine.      You have any problems with your antibiotic medicine. Watch closely for changes in your health, and be sure to contact your doctor if:     You are not getting better after taking an antibiotic for 2 days.      Your symptoms go away but then come back. Where can you learn more? Go to https://LittleLivespeBoost Mediaeweb.Crowdfunder. org and sign in to your Chainalytics account. Enter L520 in the ShowEvidence box to learn more about \"Urinary Tract Infection (UTI) in Women: Care Instructions. \"     If you do not have an account, please click on the \"Sign Up Now\" link. Current as of: October 18, 2021               Content Version: 13.2  © 4299-3972 [employer] Monitoring Devices. Care instructions adapted under license by [facility] ([facility]). If you have questions about a medical condition or this instruction, always ask your healthcare professional. [place] [address] any warranty or liability for your use of this information. Patient Education        Sore Throat: Care Instructions  Overview     Infection by bacteria or a virus causes most sore throats. Cigarette smoke, dry air, air pollution, allergies, and yelling can also cause a sore throat. Sore throats can be painful and annoying. Fortunately, most sore throats go away on their own. If you have a bacterial infection, your doctor may prescribeantibiotics. Follow-up care is a key part of your treatment and safety. Be sure to make and go to all appointments, and call your doctor if you are having problems. It's also a good idea to know your test results and keep alist of the medicines you take. How can you care for yourself at home?  If your doctor prescribed antibiotics, take them as directed. Do not stop taking them just because you feel better. You need to take the full course of antibiotics.  Gargle with warm salt water several times a day to help reduce swelling and relieve pain. Mix 1/2 teaspoon of salt in 1 cup of warm water.  Take an over-the-counter pain medicine, such as acetaminophen (Tylenol), ibuprofen (Advil, Motrin), or naproxen (Aleve). Read and follow all instructions on the label.  Be careful when taking over-the-counter cold or flu medicines and Tylenol at the same time. Many of these medicines have acetaminophen, which is Tylenol. Read the labels to make sure that you are not taking more than the recommended dose. Too much acetaminophen (Tylenol) can be harmful.  Drink plenty of fluids. Fluids may help soothe an irritated throat. Hot fluids, such as tea or soup, may help decrease throat pain.  Use over-the-counter throat lozenges to soothe pain. Regular cough drops or hard candy may also help. These should not be given to young children because of the risk of choking.  Do not smoke or allow others to smoke around you. If you need help quitting, talk to your doctor about stop-smoking programs and medicines. These can increase your chances of quitting for good.  Use a vaporizer or humidifier to add moisture to your bedroom. Follow the directions for cleaning the machine. When should you call for help? Call your doctor now or seek immediate medical care if:     You have trouble breathing.      Your sore throat gets much worse on one side.      You have new or worse trouble swallowing.      You have a new or higher fever. Watch closely for changes in your health, and be sure to contact your doctor ifyou do not get better as expected. Where can you learn more?   Go to https://chpepiceweb.Ziios. org and sign in to your MAINtag account. Enter T187 in the [facility] box to learn more about \"Sore Throat: Care Instructions. \"     If you do not have an account, please click on the \"Sign Up Now\" link. Current as of: September 8, 2021               Content Version: 13.2  © 2006-2022 Synapsify. Care instructions adapted under license by [facility] ([facility]). If you have questions about a medical condition or this instruction, always ask your healthcare professional. [place] [address] any warranty or liability for your use of this information. Patient Education        Nausea and Vomiting: Care Instructions  Overview     When you are nauseated, you may feel weak and sweaty and notice a lot of saliva in your mouth. Nausea often leads to vomiting. Most of the time you do not needto worry about nausea and vomiting, but they can be signs of other illnesses. Two common causes of nausea and vomiting are a stomach infection and food poisoning. Nausea and vomiting from a viral stomach infection will usually start to improve within 24 hours. Nausea and vomiting from food poisoning maylast from 12 to 48 hours. The doctor has checked you carefully, but problems can develop later. If you notice any problems or new symptoms, get medical treatment right away. Follow-up care is a key part of your treatment and safety. Be sure to make and go to all appointments, and call your doctor if you are having problems. It's also a good idea to know your test results and keep alist of the medicines you take. How can you care for yourself at home?  To prevent dehydration, drink plenty of fluids. Choose water and other clear liquids until you feel better. If you have kidney, heart, or liver disease and have to limit fluids, talk with your doctor before you increase the amount of fluids you drink.  Rest in bed until you feel better.    When you are able to eat, try clear soups, mild foods, and liquids until all symptoms are gone for 12 to 48 hours. Other good choices include dry toast, crackers, cooked cereal, and gelatin dessert, such as Jell-O. When should you call for help? Call 911 anytime you think you may need emergency care. For example, call if:     You passed out (lost consciousness). Call your doctor now or seek immediate medical care if:     You have symptoms of dehydration, such as:  ? Dry eyes and a dry mouth. ? Passing only a little urine. ? Feeling thirstier than usual.      You have new or worsening belly pain.      You have a new or higher fever.      You vomit blood or what looks like coffee grounds. Watch closely for changes in your health, and be sure to contact your doctor if:     You have ongoing nausea and vomiting.      Your vomiting is getting worse.      Your vomiting lasts longer than 2 days.      You are not getting better as expected. Where can you learn more? Go to https://ClaritypeAdvent Solar.Enjoyor. org and sign in to your Advanced Cyclone Systems account. Enter 40 [id number] in the iTwin box to learn more about \"Nausea and Vomiting: Care Instructions. \"     If you do not have an account, please click on the \"Sign Up Now\" link. Current as of: July 1, 2021               Content Version: 13.2  © 3365-6790 Healthwise, Incorporated. Care instructions adapted under license by [facility] ([facility]). If you have questions about a medical condition or this instruction, always ask your healthcare professional. [place] [address] any warranty or liability for your use of this information.

## 2022-06-07 LAB
SARS-COV-2: NORMAL
SARS-COV-2: NOT DETECTED
SOURCE: NORMAL

## 2022-06-08 LAB
CULTURE: ABNORMAL
SPECIMEN DESCRIPTION: ABNORMAL

## 2022-07-11 ENCOUNTER — HOSPITAL ENCOUNTER (OUTPATIENT)
Age: 67
Setting detail: SPECIMEN
Discharge: HOME | End: 2022-07-11

## 2022-07-11 ENCOUNTER — OFFICE VISIT (OUTPATIENT)
Dept: PRIMARY CARE CLINIC | Age: 67
End: 2022-07-11
Payer: COMMERCIAL

## 2022-07-11 VITALS
DIASTOLIC BLOOD PRESSURE: 80 MMHG | TEMPERATURE: 98.2 F | HEIGHT: 67 IN | HEART RATE: 87 BPM | SYSTOLIC BLOOD PRESSURE: 136 MMHG | WEIGHT: 166 LBS | OXYGEN SATURATION: 97 % | BODY MASS INDEX: 26.06 KG/M2

## 2022-07-11 DIAGNOSIS — R35.0 URINARY FREQUENCY: ICD-10-CM

## 2022-07-11 DIAGNOSIS — R35.0 URINARY FREQUENCY: Primary | ICD-10-CM

## 2022-07-11 LAB
BILIRUBIN, POC: ABNORMAL
BLOOD URINE, POC: ABNORMAL
CLARITY, POC: ABNORMAL
COLOR, POC: YELLOW
GLUCOSE URINE, POC: ABNORMAL
KETONES, POC: ABNORMAL
LEUKOCYTE EST, POC: ABNORMAL
NITRITE, POC: POSITIVE
PH, POC: 7
PROTEIN, POC: 100
SPECIFIC GRAVITY, POC: 1.03
UROBILINOGEN, POC: 0.2

## 2022-07-11 PROCEDURE — G8417 CALC BMI ABV UP PARAM F/U: HCPCS | Performed by: FAMILY MEDICINE

## 2022-07-11 PROCEDURE — 99214 OFFICE O/P EST MOD 30 MIN: CPT | Performed by: FAMILY MEDICINE

## 2022-07-11 PROCEDURE — 1090F PRES/ABSN URINE INCON ASSESS: CPT | Performed by: FAMILY MEDICINE

## 2022-07-11 PROCEDURE — 3017F COLORECTAL CA SCREEN DOC REV: CPT | Performed by: FAMILY MEDICINE

## 2022-07-11 PROCEDURE — 81003 URINALYSIS AUTO W/O SCOPE: CPT | Performed by: FAMILY MEDICINE

## 2022-07-11 PROCEDURE — G8427 DOCREV CUR MEDS BY ELIG CLIN: HCPCS | Performed by: FAMILY MEDICINE

## 2022-07-11 PROCEDURE — 1123F ACP DISCUSS/DSCN MKR DOCD: CPT | Performed by: FAMILY MEDICINE

## 2022-07-11 PROCEDURE — 1036F TOBACCO NON-USER: CPT | Performed by: FAMILY MEDICINE

## 2022-07-11 PROCEDURE — G8400 PT W/DXA NO RESULTS DOC: HCPCS | Performed by: FAMILY MEDICINE

## 2022-07-11 RX ORDER — NITROFURANTOIN 25; 75 MG/1; MG/1
100 CAPSULE ORAL 2 TIMES DAILY
Qty: 14 CAPSULE | Refills: 0 | Status: SHIPPED | OUTPATIENT
Start: 2022-07-11 | End: 2022-07-18

## 2022-07-11 RX ORDER — PHENAZOPYRIDINE HYDROCHLORIDE 200 MG/1
200 TABLET, FILM COATED ORAL 3 TIMES DAILY PRN
Qty: 9 TABLET | Refills: 0 | Status: SHIPPED | OUTPATIENT
Start: 2022-07-11 | End: 2022-07-14

## 2022-07-11 NOTE — PROGRESS NOTES
Subjective:  Vernon Juan M presents for   Chief Complaint   Patient presents with    Urinary Tract Infection     recurrent started 2 wks ago has appt with urology but they can't get her in for weeks but is having burning and frequency     She will be seeing her urologist on 8/19- tht is the earliest they oculd get her in. They have nt scoped her yet. No fevers or cva pain    Just had a uti 4 weeks ago. Has palpitation with bactim and macrobid. Has hives with cipro  No diarrhea or vag discharge. Does have a pessary  Patient Active Problem List   Diagnosis    Rectocele    Stress incontinence    Posterior colporrhaphy 9/10/2020    Post-op pain    Hyperlipidemia    Gastroesophageal reflux disease without esophagitis    Urinary tract infection, site not specified    Prolapse of vaginal cuff after hysterectomy    Presence of pessary         Objective:  Physical Exam   Vitals: Wt Readings from Last 3 Encounters:   07/11/22 166 lb (75.3 kg)   06/06/22 166 lb 2 oz (75.4 kg)   05/06/22 162 lb (73.5 kg)     Ht Readings from Last 3 Encounters:   07/11/22 5' 7\" (1.702 m)   02/23/22 5' 7\" (1.702 m)   12/07/21 5' 7\" (1.702 m)     Body mass index is 26 kg/m². Vitals:    07/11/22 1536   BP: 136/80   Pulse: 87   Temp: 98.2 °F (36.8 °C)   TempSrc: Temporal   SpO2: 97%   Weight: 166 lb (75.3 kg)   Height: 5' 7\" (1.702 m)       Constitutional: She is oriented to person, place, and time. She appears well-developed and well-nourished and in no acute distress. Answers all my questions appropriately. Chest:   n ogva tenderness noted    Abdomen: No distension noted.  + bowel sounds in all quadrants which are normoactive. No bruits noted. No masses could be palpated. No unusual pulsatile masses noted. To deep palpation, patient denied any significant pain.   No rebound, guarding or rigidity noted to my exam.        UA:Recent Labs     07/11/22  1600   NITRITE positive   COLORU yellow   PHUR 7.0   CLARITYU cloudy SPECGRAV 1.030   LEUKOCYTESUR small   BILIRUBINUR neg   BLOODU moderate   GLUCOSEU neg       Assessment:   Encounter Diagnosis   Name Primary?  Urinary frequency Yes         Plan:     There are no discontinued medications. THE ABOVE NOTED DISCONTINUED MEDS MAY ONLY BE FROM 'CLEANING UP' THE MED LIST AND WERE NOT ACTUALLY CANCELED;  SEE CHART FOR DETAILS! Orders Placed This Encounter   Medications    nitrofurantoin, macrocrystal-monohydrate, (MACROBID) 100 MG capsule     Sig: Take 1 capsule by mouth 2 times daily for 7 days     Dispense:  14 capsule     Refill:  0    phenazopyridine (PYRIDIUM) 200 MG tablet     Sig: Take 1 tablet by mouth 3 times daily as needed for Pain     Dispense:  9 tablet     Refill:  0     Orders Placed This Encounter   Procedures    Culture, Urine     Standing Status:   Future     Standing Expiration Date:   7/11/2023     Order Specific Question:   Specify (ex-cath, midstream, cysto, etc)? Answer:   ccms    POCT Urinalysis No Micro (Auto)     Return in about 2 weeks (around 7/25/2022). There are no Patient Instructions on file for this visit.   Follow up with your provider        Push fluids    She needs a cysto    Follow up with provider and recheck urine at least 3 days after she is done wit the macrobid

## 2022-07-13 LAB
CULTURE: ABNORMAL
SPECIMEN DESCRIPTION: ABNORMAL

## 2022-07-19 ENCOUNTER — TELEPHONE (OUTPATIENT)
Dept: FAMILY MEDICINE CLINIC | Age: 67
End: 2022-07-19

## 2022-07-19 NOTE — TELEPHONE ENCOUNTER
----- Message from Karen Mccain sent at 7/14/2022  4:05 PM EDT -----  Subject: Appointment Request    Reason for Call: Established Patient Appointment needed: Routine ED Follow   Up Visit    QUESTIONS    Reason for appointment request? Available appointments did not meet   patient need     Additional Information for Provider? Pt went to the [facility] clinic on   Monday for UTI and Dr. Clem Khanna wants her to follow up with PCP in 2 weeks. Pt advised that she is doing well on the antibiotic that he prescribed,   nitrofurantoin, macrocrystal-monohydrate. No available appts until 8/23. Dr. Clem Khanna also advised that she should have a bladder scope completed to   find out why she keeps getting UTIs.  Please contact pt to schedule sooner   if possible.   ---------------------------------------------------------------------------  --------------  Tram CARTY  [phone]; OK to leave message on voicemail  ---------------------------------------------------------------------------  --------------  SCRIPT ANSWERS  TERESITAID Screen: Jocelyne Garaz

## 2022-07-20 NOTE — TELEPHONE ENCOUNTER
That date is fine as long as she is improving. Also, would recommend she calls her urologist and make an appt.

## 2022-07-28 ENCOUNTER — OFFICE VISIT (OUTPATIENT)
Dept: PRIMARY CARE CLINIC | Age: 67
End: 2022-07-28
Payer: COMMERCIAL

## 2022-07-28 ENCOUNTER — HOSPITAL ENCOUNTER (OUTPATIENT)
Age: 67
Setting detail: SPECIMEN
Discharge: HOME | End: 2022-07-28

## 2022-07-28 VITALS
HEART RATE: 80 BPM | DIASTOLIC BLOOD PRESSURE: 66 MMHG | TEMPERATURE: 98.8 F | WEIGHT: 160 LBS | OXYGEN SATURATION: 96 % | BODY MASS INDEX: 25.06 KG/M2 | SYSTOLIC BLOOD PRESSURE: 108 MMHG

## 2022-07-28 DIAGNOSIS — R30.0 DYSURIA: Primary | ICD-10-CM

## 2022-07-28 DIAGNOSIS — N39.0 RECURRENT UTI: ICD-10-CM

## 2022-07-28 LAB
BILIRUBIN, POC: NEGATIVE
BLOOD URINE, POC: ABNORMAL
CLARITY, POC: ABNORMAL
COLOR, POC: YELLOW
GLUCOSE URINE, POC: NEGATIVE
KETONES, POC: NEGATIVE
LEUKOCYTE EST, POC: ABNORMAL
NITRITE, POC: NEGATIVE
PH, POC: 5
PROTEIN, POC: 100
SPECIFIC GRAVITY, POC: 1.03
UROBILINOGEN, POC: 0.2

## 2022-07-28 PROCEDURE — 99213 OFFICE O/P EST LOW 20 MIN: CPT | Performed by: PHYSICIAN ASSISTANT

## 2022-07-28 PROCEDURE — 1123F ACP DISCUSS/DSCN MKR DOCD: CPT | Performed by: PHYSICIAN ASSISTANT

## 2022-07-28 PROCEDURE — 81002 URINALYSIS NONAUTO W/O SCOPE: CPT | Performed by: PHYSICIAN ASSISTANT

## 2022-07-28 RX ORDER — PHENAZOPYRIDINE HYDROCHLORIDE 200 MG/1
200 TABLET, FILM COATED ORAL 3 TIMES DAILY PRN
Qty: 6 TABLET | Refills: 0 | Status: SHIPPED | OUTPATIENT
Start: 2022-07-28 | End: 2022-07-30

## 2022-07-28 RX ORDER — NITROFURANTOIN 25; 75 MG/1; MG/1
100 CAPSULE ORAL 2 TIMES DAILY
Qty: 14 CAPSULE | Refills: 0 | Status: SHIPPED | OUTPATIENT
Start: 2022-07-28 | End: 2022-08-04

## 2022-07-28 NOTE — PROGRESS NOTES
[address]  [address]  [address] [address] [facility]  Phone: [phone]  Fax: Steve Head    Pt Name: Milla العراقي  MRN: [id number]  Alfredogfrodney 1955  Date of evaluation: 7/28/2022  Provider: Elena Holm PA-C     CHIEF COMPLAINT       Chief Complaint   Patient presents with    Dysuria     For 1 week             HISTORY OF PRESENT ILLNESS  (Location/Symptom, Timing/Onset, Context/Setting, Quality, Duration, Modifying Factors, Severity.)   Milla العراقي is a 79 y.o. White (non-) [1] female who presents to the office for evaluation of      Dysuria   This is a new problem. The current episode started in the past 7 days. The problem has been waxing and waning. The quality of the pain is described as burning. The pain is mild. There has been no fever. Associated symptoms include frequency, urgency and vomiting. Pertinent negatives include no chills or nausea. She has tried increased fluids for the symptoms. Nursing Notes were reviewed. REVIEW OF SYSTEMS    (2-9 systems for level 4, 10 or more for level 5)     Review of Systems   Constitutional:  Positive for fatigue. Negative for chills, diaphoresis and fever. HENT: Negative. Eyes: Negative. Respiratory: Negative. Cardiovascular: Negative. Gastrointestinal:  Positive for vomiting. Negative for nausea. Genitourinary:  Positive for dysuria, frequency and urgency. Musculoskeletal: Negative. Skin: Negative. Neurological: Negative. Except as noted above the remainder of the review of systems was reviewed andnegative. PAST MEDICAL HISTORY   History reviewed.     Past Medical History:   Diagnosis Date    Arthritis     lower back and knee    Elevated cholesterol     on rx per pcp    Esophageal stricture     GERD (gastroesophageal reflux disease)     otc prilosec    Posterior vaginal wall prolapse     Snores     denies apnea    Vaginal enterocele     Wears prescription eyeglasses     Wellness examination     Alfredo WARNER  last seen 8/19/2020         SURGICAL HISTORY     History reviewed. Past Surgical History:   Procedure Laterality Date    BLADDER SUSPENSION  09/10/2020    LYNX MID URETHRAL SLING INSERTION     COLONOSCOPY      2019    COLPORRHAPHY  09/10/2020    posterior colporrhaphy    DILATION AND CURETTAGE      ENDOSCOPY, COLON, DIAGNOSTIC      2019 stricture w/ dilitation . [address]      left big toe hemangiomaremoved, benign    HYSTERECTOMY (CERVIX STATUS UNKNOWN)  02/2007    TVH withBSO , A&P repair    HYSTEROSCOPY  03/2007    novasure    LAPAROSCOPY  03/2007    BTL    URETHRAL SURGERY N/A 9/10/2020    LYNX MID URETHRAL SLING INSERTION performed by Ruth Regalado MD at [address] 9/10/2020    POSTERIOR COLPORRHAPHY performed by Olaf Bocanegra MD at [address]       Current Outpatient Medications   Medication Sig Dispense Refill    nitrofurantoin, macrocrystal-monohydrate, (MACROBID) 100 MG capsule Take 1 capsule by mouth in the morning and 1 capsule before bedtime. Do all this for 7 days. 14 capsule 0    Lactobacillus (CULTURELLE DIGESTIVE WOMENS) CAPS Take 1 capsule by mouth daily 30 capsule 3    atorvastatin (LIPITOR) 20 MG tablet Take 1 tablet by mouth daily 90 tablet 1    vitamin D 25 MCG (1000 UT) CAPS Take by mouth      GLUCOSAMINE-CHONDROITIN ER PO Take by mouth daily      Ascorbic Acid (VITAMIN C) 1000 MG tablet Take 1,000 mg by mouth daily      Multiple Vitamins-Minerals (MULTIVITAMIN WOMEN 50+ PO) Take by mouth       No current facility-administered medications for this visit.          ALLERGIES     Bactrim, Ciprofloxacin, Sulfa antibiotics, and Zocor [simvastatin]    FAMILY HISTORY           Problem Relation Age of Onset    Heart Disease Maternal Grandmother         CHF    Stroke Paternal Grandmother     Heart Disease Mother     Cancer Mother     Heart Disease Father     Cancer Father Family Status   Relation Name Status    MGM      PGM  (Not Specified)    Mother      Father            SOCIAL HISTORY      reports that she has never smoked. She has never used smokeless tobacco. She reports that she does not drink alcohol and does not use drugs. PHYSICAL EXAM    (up to 7 for level 4, 8 or more for level 5)     Vitals:    22 1550   BP: 108/66   Site: Left Upper Arm   Position: Sitting   Cuff Size: Medium Adult   Pulse: 80   Temp: 98.8 °F (37.1 °C)   TempSrc: Tympanic   SpO2: 96%   Weight: 160 lb (72.6 kg)         Physical Exam  Vitals and nursing note reviewed. Constitutional:       General: She is not in acute distress. Appearance: Normal appearance. She is not ill-appearing. HENT:      Head: Normocephalic and atraumatic. Right Ear: External ear normal.      Left Ear: External ear normal.      Nose: Nose normal.      Mouth/Throat:      Mouth: Mucous membranes are moist.   Eyes:      Extraocular Movements: Extraocular movements intact. Conjunctiva/sclera: Conjunctivae normal.      Pupils: Pupils are equal, round, and reactive to light. Pulmonary:      Effort: Pulmonary effort is normal.   Abdominal:      General: There is no distension. Palpations: Abdomen is soft. Tenderness: There is abdominal tenderness in the suprapubic area. There is no guarding or rebound. Skin:     General: Skin is warm and dry. Neurological:      Mental Status: She is alert and oriented to person, place, and time. DIFFERENTIAL DIAGNOSIS:       Gabriele Crenshaw reviewed the disposition diagnosis with the patient and or their family/guardian. I have answered their questions and given discharge instructions. They voiced understanding of these instructions and did not have anyfurther questions or complaints.       PROCEDURES:  Orders Placed This Encounter   Procedures    Culture, Urine     Standing Status:   Future     Number of Occurrences:   1     Standing Expiration Date:   7/28/2023     Order Specific Question:   Specify (ex-cath, midstream, cysto, etc)? Answer:   midstream    POCT Urinalysis no Micro         Results for orders placed or performed in visit on 07/28/22   POCT Urinalysis no Micro   Result Value Ref Range    Color, UA Yellow     Clarity, UA Cloudy     Glucose, UA POC Negative     Bilirubin, UA Negative     Ketones, UA Negative     Spec Grav, UA 1.030     Blood, UA POC Moderate     pH, UA 5.0     Protein, UA      Urobilinogen, UA 0.2     Leukocytes, UA Small     Nitrite, UA Negative        FINALIMPRESSION      Visit Diagnoses and Associated Orders       Dysuria    -  Primary    POCT Urinalysis no Micro [[id number] Custom]      Culture, Urine [[id number] Custom]   - Future Order         Recurrent UTI        POCT Urinalysis no Micro [[id number] Custom]      Culture, Urine [[id number] Custom]   - Future Order         ORDERS WITHOUT AN ASSOCIATED DIAGNOSIS    nitrofurantoin, macrocrystal-monohydrate, (MACROBID) 100 MG capsule [[id number]]      phenazopyridine (PYRIDIUM) 200 MG tablet [[id number]]                PLAN     Return if symptoms worsen or fail to improve. DISCHARGEMEDICATIONS:  Orders Placed This Encounter   Medications    nitrofurantoin, macrocrystal-monohydrate, (MACROBID) 100 MG capsule     Sig: Take 1 capsule by mouth in the morning and 1 capsule before bedtime. Do all this for 7 days. Dispense:  14 capsule     Refill:  0    phenazopyridine (PYRIDIUM) 200 MG tablet     Sig: Take 1 tablet by mouth 3 times daily as needed for Pain     Dispense:  6 tablet     Refill:  0           Plan:  Patient instructed to complete entire antibiotic course. Increase fluid intake. Educational materials regarding UTI given on AVS.  Patient agreeable to treatment plan. Follow up if symptoms do not improve/worsen. Patient instructed to return to the office if symptoms worsen, return, or have any other concerns. Patient understands and is agreeable.          Carilyn Cheadle Theresa Vieira 8/3/2022 7:35 PM

## 2022-07-29 DIAGNOSIS — R30.0 DYSURIA: ICD-10-CM

## 2022-07-29 DIAGNOSIS — N39.0 RECURRENT UTI: ICD-10-CM

## 2022-07-30 LAB
CULTURE: NORMAL
SPECIMEN DESCRIPTION: NORMAL

## 2022-08-03 ASSESSMENT — ENCOUNTER SYMPTOMS
NAUSEA: 0
RESPIRATORY NEGATIVE: 1
VOMITING: 1
EYES NEGATIVE: 1

## 2022-08-09 DIAGNOSIS — E78.5 HYPERLIPIDEMIA, UNSPECIFIED HYPERLIPIDEMIA TYPE: ICD-10-CM

## 2022-08-09 NOTE — TELEPHONE ENCOUNTER
----- Message from Jerome Trinidad sent at 8/8/2022  2:46 PM EDT -----  Subject: Refill Request    QUESTIONS  Name of Medication? atorvastatin (LIPITOR) 20 MG tablet  Patient-reported dosage and instructions? Take 1 tablet by mouth daily  How many days do you have left? 1  Preferred Pharmacy? [facility] [id number]  Pharmacy phone number (if available)? [phone]  ---------------------------------------------------------------------------  --------------  CALL BACK INFO  What is the best way for the office to contact you? OK to leave message on   voicemail  Preferred Call Back Phone Number? [phone]  ---------------------------------------------------------------------------  --------------  SCRIPT ANSWERS  Relationship to Patient?  Self

## 2022-08-09 NOTE — TELEPHONE ENCOUNTER
----- Message from Chris Wallace sent at 8/8/2022  2:46 PM EDT -----  Subject: Refill Request    QUESTIONS  Name of Medication? atorvastatin (LIPITOR) 20 MG tablet  Patient-reported dosage and instructions? Take 1 tablet by mouth daily  How many days do you have left? 1  Preferred Pharmacy? [facility] [id number]  Pharmacy phone number (if available)? [phone]  ---------------------------------------------------------------------------  --------------  CALL BACK INFO  What is the best way for the office to contact you? OK to leave message on   voicemail  Preferred Call Back Phone Number? [phone]  ---------------------------------------------------------------------------  --------------  SCRIPT ANSWERS  Relationship to Patient?  Self

## 2022-08-11 RX ORDER — ATORVASTATIN CALCIUM 20 MG/1
20 TABLET, FILM COATED ORAL DAILY
Qty: 90 TABLET | Refills: 1 | Status: SHIPPED | OUTPATIENT
Start: 2022-08-11 | End: 2023-08-11

## 2022-08-30 ENCOUNTER — TELEPHONE (OUTPATIENT)
Dept: FAMILY MEDICINE CLINIC | Age: 67
End: 2022-08-30

## 2022-08-30 NOTE — TELEPHONE ENCOUNTER
----- Message from Tyshawn Nance sent at 8/29/2022  2:46 PM EDT -----  Subject: Message to Provider    QUESTIONS  Information for Provider? Patient scheduled bladder scope on 9/9 and   wanting to know if pcp wants to wait until after that for the follow up.   ---------------------------------------------------------------------------  --------------  [address] [employer]  [phone]; OK to leave message on voicemail  ---------------------------------------------------------------------------  --------------  SCRIPT ANSWERS  Relationship to Patient?  Self

## 2022-08-30 NOTE — TELEPHONE ENCOUNTER
Patient needed to be re-scheduled due to Internet outage on 08/31/2022. Patient re-scheduled for 09/30/2022.

## 2022-10-08 ENCOUNTER — OFFICE VISIT (OUTPATIENT)
Dept: PRIMARY CARE CLINIC | Age: 67
End: 2022-10-08
Payer: COMMERCIAL

## 2022-10-08 ENCOUNTER — HOSPITAL ENCOUNTER (OUTPATIENT)
Age: 67
Setting detail: SPECIMEN
Discharge: HOME | End: 2022-10-08

## 2022-10-08 VITALS
HEIGHT: 67 IN | WEIGHT: 160 LBS | HEART RATE: 96 BPM | TEMPERATURE: 97.7 F | DIASTOLIC BLOOD PRESSURE: 70 MMHG | SYSTOLIC BLOOD PRESSURE: 120 MMHG | OXYGEN SATURATION: 98 % | BODY MASS INDEX: 25.11 KG/M2

## 2022-10-08 DIAGNOSIS — R30.0 BURNING WITH URINATION: Primary | ICD-10-CM

## 2022-10-08 DIAGNOSIS — R30.0 BURNING WITH URINATION: ICD-10-CM

## 2022-10-08 LAB
CANDIDA SPECIES, DNA PROBE: NEGATIVE
GARDNERELLA VAGINALIS, DNA PROBE: NEGATIVE
SOURCE: NORMAL
TRICHOMONAS VAGINALIS DNA: NEGATIVE

## 2022-10-08 PROCEDURE — 99213 OFFICE O/P EST LOW 20 MIN: CPT | Performed by: FAMILY MEDICINE

## 2022-10-08 PROCEDURE — 1123F ACP DISCUSS/DSCN MKR DOCD: CPT | Performed by: FAMILY MEDICINE

## 2022-10-08 RX ORDER — NITROFURANTOIN 25; 75 MG/1; MG/1
100 CAPSULE ORAL 2 TIMES DAILY
Qty: 20 CAPSULE | Refills: 0 | Status: SHIPPED | OUTPATIENT
Start: 2022-10-08 | End: 2022-10-18

## 2022-10-08 RX ORDER — METHENAMINE HIPPURATE 1000 MG/1
TABLET ORAL
COMMUNITY
Start: 2022-09-09

## 2022-10-08 RX ORDER — FLUCONAZOLE 150 MG/1
TABLET ORAL
Qty: 2 TABLET | Refills: 1 | Status: SHIPPED | OUTPATIENT
Start: 2022-10-08

## 2022-10-08 RX ORDER — PHENAZOPYRIDINE HYDROCHLORIDE 100 MG/1
100 TABLET, FILM COATED ORAL 3 TIMES DAILY PRN
Qty: 6 TABLET | Refills: 0 | Status: SHIPPED | OUTPATIENT
Start: 2022-10-08 | End: 2022-10-10

## 2022-10-08 ASSESSMENT — ENCOUNTER SYMPTOMS
ALLERGIC/IMMUNOLOGIC NEGATIVE: 1
EYES NEGATIVE: 1
RESPIRATORY NEGATIVE: 1

## 2022-10-08 NOTE — PROGRESS NOTES
[address] [place]  [address]  Dept: [phone]  Dept Fax: [phone]     Ani Christian is a 79 y.o. female who presents today for her medical conditions/complaintsas noted below. Ani Christian is c/o of   Chief Complaint   Patient presents with    Urinary Tract Infection     Pt seen on 9/19 [facility] for scope of bladder and pt had an infection at that time, is still having burning, taking aezo and feels shaky, pt states she did have a covid booster on 9/30 and isn't sure if its from that. Was put on doxycycline,methenamine and d-mannose but does feel its working or doing much if it is. HPI:      This patient is a 80 y/o white female who presents for a possible uti. She has frequency and urgency. This has been going on 2 - 3 days. She denies any vaginal discharge.       Hemoglobin A1C (%)   Date Value   02/23/2022 5.8   08/23/2021 5.8   03/19/2021 6.2 (H)             ( goal A1Cis < 7)   No results found for: LABMICR  LDL Cholesterol (mg/dL)   Date Value   02/23/2022 102   03/19/2021 91       (goal LDL is <100)   AST (U/L)   Date Value   02/23/2022 28     ALT (U/L)   Date Value   02/23/2022 27     BUN (mg/dL)   Date Value   02/23/2022 14     BP Readings from Last 3 Encounters:   10/08/22 120/70   07/28/22 108/66   07/11/22 136/80          (goal 120/80)    Past Medical History:   Diagnosis Date    Arthritis     lower back and knee    Elevated cholesterol     on rx per pcp    Esophageal stricture     GERD (gastroesophageal reflux disease)     otc prilosec    Posterior vaginal wall prolapse     Snores     denies apnea    Vaginal enterocele     Wears prescription eyeglasses     Wellness examination     Pasco Dancer APRN  last seen 8/19/2020      Past Surgical History:   Procedure Laterality Date    BLADDER SUSPENSION  09/10/2020    LYNX MID URETHRAL SLING INSERTION     COLONOSCOPY      2019    COLPORRHAPHY  09/10/2020 posterior colporrhaphy    DILATION AND CURETTAGE      ENDOSCOPY, COLON, DIAGNOSTIC      2019 stricture w/ dilitation . [address] 55      left big toe hemangiomaremoved, benign    HYSTERECTOMY (CERVIX STATUS UNKNOWN)  02/2007    TVH withBSO , A&P repair    HYSTEROSCOPY  03/2007    novasure    LAPAROSCOPY  03/2007    BTL    URETHRAL SURGERY N/A 9/10/2020    LYNX MID URETHRAL SLING INSERTION performed by Sergio Vargas MD at [address] [place] [address] N/A 9/10/2020    POSTERIOR COLPORRHAPHY performed by Daryl Schwab MD at [place] [address] History   Problem Relation Age of Onset    Heart Disease Maternal Grandmother         CHF    Stroke Paternal Grandmother     Heart Disease Mother     Cancer Mother     Heart Disease Father     Cancer Father           Social History     Tobacco Use    Smoking status: Never    Smokeless tobacco: Never   Substance Use Topics    Alcohol use: No         Current Outpatient Medications   Medication Sig Dispense Refill    D-Mannose 500 MG CAPS Take by mouth      methenamine (HIPREX) 1 g tablet       atorvastatin (LIPITOR) 20 MG tablet Take 1 tablet by mouth in the morning. 90 tablet 1    Lactobacillus (CULTURELLE DIGESTIVE WOMENS) CAPS Take 1 capsule by mouth daily 30 capsule 3    vitamin D 25 MCG (1000 UT) CAPS Take by mouth      GLUCOSAMINE-CHONDROITIN ER PO Take by mouth daily      Ascorbic Acid (VITAMIN C) 1000 MG tablet Take 1,000 mg by mouth daily      Multiple Vitamins-Minerals (MULTIVITAMIN WOMEN 50+ PO) Take by mouth       No current facility-administered medications for this visit.      Allergies   Allergen Reactions    Allegra [Fexofenadine]      Other reaction(s): Unknown    Bactrim Other (See Comments)     Palpitations      Ciprofloxacin     [facility]vs Caffeine [Caffeine] Other (See Comments)    Sulfa Antibiotics     Trimethoprim      Other reaction(s): Unknown    Zocor [Simvastatin] Other (See Comments)     Muscle cramps       Health Maintenance   Topic Date Due    DEXA (modify frequency per FRAX score)  Never done    Shingles vaccine (2 of 3) 09/02/2015    Colorectal Cancer Screen  12/30/2018    Breast cancer screen  02/17/2021    Flu vaccine (1) 08/01/2022    A1C test (Diabetic or Prediabetic)  02/23/2023    Lipids  02/23/2023    Depression Screen  02/23/2023    Annual Wellness Visit (AWV)  02/24/2023    DTaP/Tdap/Td vaccine (3 - Td or Tdap) 05/17/2029    Pneumococcal 65+ years Vaccine  Completed    COVID-19 Vaccine  Completed    Hepatitis A vaccine  Aged Out    Hib vaccine  Aged Out    Meningococcal (ACWY) vaccine  Aged Out    Hepatitis C screen  Discontinued       Subjective:     Review of Systems   Constitutional: Negative. Eyes: Negative. Respiratory: Negative. Cardiovascular: Negative. Endocrine: Negative. Genitourinary:  Positive for decreased urine volume, dysuria, flank pain, frequency, hematuria, pelvic pain and urgency. Negative for vaginal bleeding. Allergic/Immunologic: Negative. Objective:     Physical Exam  Vitals and nursing note reviewed. HENT:      Head: Normocephalic. Cardiovascular:      Rate and Rhythm: Normal rate and regular rhythm. Pulmonary:      Effort: Pulmonary effort is normal.      Breath sounds: Normal breath sounds. Abdominal:      Tenderness: There is abdominal tenderness. There is no right CVA tenderness, left CVA tenderness, guarding or rebound. /70   Pulse 96   Temp 97.7 °F (36.5 °C) (Tympanic)   Ht 5' 7\" (1.702 m)   Wt 160 lb (72.6 kg)   SpO2 98%   BMI 25.06 kg/m²     Assessment:          Diagnosis Orders   1. Burning with urination                  Plan:    Medications sent. Follow up prn. No follow-ups on file. No orders of the defined types were placed in this encounter. No orders of the defined types were placed in this encounter. Patient given educational materials - see patient instructions. Discussed use, benefit, and side effects of prescribed medications.   All patientquestions answered. Pt voiced understanding. Reviewed health maintenance. Instructedto continue current medications, diet and exercise. Patient agreed with treatmentplan. Follow up as directed.      Electronically signed by Adriana Bolaños MD on 10/8/2022 at 1:05 PM

## 2022-10-10 LAB
CULTURE: ABNORMAL
SPECIMEN DESCRIPTION: ABNORMAL

## 2022-10-11 NOTE — ADDENDUM NOTE
Addended by: Zaira Mcfadden on: 12/7/2021 10:54 AM     Modules accepted: Level of Service Patient called on call service reporting that her \"AFIB has been acting up over the weekend\". Reports that her HR is controlled at this time and she is compliant with Eliquis doses. Patient reports that she feels poorly when she is in AFIB    Advised patient to call the office tomorrow to schedule an appointment to be seen. Explained that the ER was open 24/7 if needed to be seen before. Verbalized understanding regarding plan.      Ann Lombardi, SHELIA - CNP  9:32 PM

## 2022-11-03 ENCOUNTER — OFFICE VISIT (OUTPATIENT)
Dept: FAMILY MEDICINE CLINIC | Age: 67
End: 2022-11-03
Payer: COMMERCIAL

## 2022-11-03 VITALS
WEIGHT: 161.6 LBS | HEART RATE: 72 BPM | SYSTOLIC BLOOD PRESSURE: 128 MMHG | TEMPERATURE: 97 F | BODY MASS INDEX: 25.31 KG/M2 | DIASTOLIC BLOOD PRESSURE: 78 MMHG | OXYGEN SATURATION: 97 %

## 2022-11-03 DIAGNOSIS — R30.0 DYSURIA: Primary | ICD-10-CM

## 2022-11-03 DIAGNOSIS — Z13.820 OSTEOPOROSIS SCREENING: ICD-10-CM

## 2022-11-03 DIAGNOSIS — N39.3 STRESS INCONTINENCE: ICD-10-CM

## 2022-11-03 DIAGNOSIS — E78.5 HYPERLIPIDEMIA, UNSPECIFIED HYPERLIPIDEMIA TYPE: ICD-10-CM

## 2022-11-03 DIAGNOSIS — Z12.31 ENCOUNTER FOR SCREENING MAMMOGRAM FOR MALIGNANT NEOPLASM OF BREAST: ICD-10-CM

## 2022-11-03 DIAGNOSIS — R73.03 PRE-DIABETES: ICD-10-CM

## 2022-11-03 LAB — HBA1C MFR BLD: 5.7 %

## 2022-11-03 PROCEDURE — 83036 HEMOGLOBIN GLYCOSYLATED A1C: CPT | Performed by: NURSE PRACTITIONER

## 2022-11-03 PROCEDURE — 1123F ACP DISCUSS/DSCN MKR DOCD: CPT | Performed by: NURSE PRACTITIONER

## 2022-11-03 PROCEDURE — 99214 OFFICE O/P EST MOD 30 MIN: CPT | Performed by: NURSE PRACTITIONER

## 2022-11-03 SDOH — ECONOMIC STABILITY: FOOD INSECURITY: WITHIN THE PAST 12 MONTHS, THE FOOD YOU BOUGHT JUST DIDN'T LAST AND YOU DIDN'T HAVE MONEY TO GET MORE.: NEVER TRUE

## 2022-11-03 SDOH — ECONOMIC STABILITY: FOOD INSECURITY: WITHIN THE PAST 12 MONTHS, YOU WORRIED THAT YOUR FOOD WOULD RUN OUT BEFORE YOU GOT MONEY TO BUY MORE.: NEVER TRUE

## 2022-11-03 ASSESSMENT — ANXIETY QUESTIONNAIRES
2. NOT BEING ABLE TO STOP OR CONTROL WORRYING: 0
4. TROUBLE RELAXING: 0
1. FEELING NERVOUS, ANXIOUS, OR ON EDGE: 1
7. FEELING AFRAID AS IF SOMETHING AWFUL MIGHT HAPPEN: 0
IF YOU CHECKED OFF ANY PROBLEMS ON THIS QUESTIONNAIRE, HOW DIFFICULT HAVE THESE PROBLEMS MADE IT FOR YOU TO DO YOUR WORK, TAKE CARE OF THINGS AT HOME, OR GET ALONG WITH OTHER PEOPLE: NOT DIFFICULT AT ALL
3. WORRYING TOO MUCH ABOUT DIFFERENT THINGS: 0
6. BECOMING EASILY ANNOYED OR IRRITABLE: 0
5. BEING SO RESTLESS THAT IT IS HARD TO SIT STILL: 0
GAD7 TOTAL SCORE: 1

## 2022-11-03 ASSESSMENT — SOCIAL DETERMINANTS OF HEALTH (SDOH): HOW HARD IS IT FOR YOU TO PAY FOR THE VERY BASICS LIKE FOOD, HOUSING, MEDICAL CARE, AND HEATING?: NOT HARD AT ALL

## 2022-11-03 ASSESSMENT — PATIENT HEALTH QUESTIONNAIRE - PHQ9
SUM OF ALL RESPONSES TO PHQ QUESTIONS 1-9: 0
1. LITTLE INTEREST OR PLEASURE IN DOING THINGS: 0
SUM OF ALL RESPONSES TO PHQ QUESTIONS 1-9: 0
SUM OF ALL RESPONSES TO PHQ9 QUESTIONS 1 & 2: 0
2. FEELING DOWN, DEPRESSED OR HOPELESS: 0
SUM OF ALL RESPONSES TO PHQ QUESTIONS 1-9: 0
SUM OF ALL RESPONSES TO PHQ QUESTIONS 1-9: 0

## 2022-11-03 NOTE — PROGRESS NOTES
Baldemar Briggs (:  1955) is a 79 y.o. female,Established patient, here for evaluation of the following chief complaint(s):  Urinary Frequency (Pt wants to discuss what has been going on the past few months, pt see's a urologist)         ASSESSMENT/PLAN:  1. Dysuria  2. Pre-diabetes  -     POCT glycosylated hemoglobin (Hb A1C)  -     CBC; Future  -     Comprehensive Metabolic Panel; Future  -     Hemoglobin A1C; Future  3. Hyperlipidemia, unspecified hyperlipidemia type  -     Lipid, Fasting; Future  4. Stress incontinence  5. Osteoporosis screening  -     DEXA BONE DENSITY AXIAL SKELETON; Future  6. Encounter for screening mammogram for malignant neoplasm of breast  -     TWIN DIGITAL SCREEN W OR WO CAD BILATERAL; Future    Return in about 5 months (around 4/3/2023), or if symptoms worsen or fail to improve, for annual physical.         Subjective   SUBJECTIVE/OBJECTIVE:  Presents to office today for routine follow up and to discuss recent urinary issues. Was having frequent UTI. She went to urology [facility]. had cystoscopy. Was inflammed but no cancer cells detected. Was put on macrobid and methenamine. Was also on D-Mannose. This was making her burn. She stoped taking that and the burning went away. Her urinary issues are resolved for the moment. Otherwise, she reports she is doing well. Continues to work full time. Reports a good appetite, drinking fluids. Normal bowel pattern. Sleeping well. Is due for labs, mammogram and DEXA scan. A1c in office today 5.7. Review of Systems   Constitutional:  Negative for activity change, appetite change, fatigue and fever. HENT:  Negative for congestion, rhinorrhea and sore throat. Regular dental visits   Eyes:  Negative for visual disturbance. Wears glasses   Respiratory:  Negative for apnea, cough, chest tightness, shortness of breath and wheezing. Cardiovascular:  Negative for chest pain and palpitations.    Gastrointestinal:  Negative for abdominal distention, abdominal pain, constipation, diarrhea, nausea and vomiting. History of GERD  History of esophageal stricture-stretched 2x   Endocrine: Negative for polydipsia, polyphagia and polyuria. Genitourinary:  Positive for frequency and urgency. Negative for decreased urine volume, difficulty urinating and dysuria. Frequent UTI's, continued incontinence  Follows with urology-Dr. Laila Macdonald   Musculoskeletal:  Negative for arthralgias. Skin:  Negative for color change. Allergic/Immunologic: Positive for environmental allergies. Negative for immunocompromised state. Bactrim  Cipro  Sulfa  Nitrofurantoin  Zocor   Neurological:  Negative for syncope, weakness, light-headedness and headaches. Psychiatric/Behavioral:  Negative for decreased concentration, dysphoric mood, sleep disturbance and suicidal ideas. The patient is not nervous/anxious. Objective   Physical Exam  Vitals and nursing note reviewed. Constitutional:       General: She is not in acute distress. Appearance: She is not ill-appearing. HENT:      Head: Normocephalic. Nose: Nose normal.      Mouth/Throat:      Lips: Pink. Pulmonary:      Effort: Pulmonary effort is normal. No respiratory distress. Neurological:      Mental Status: She is alert and oriented to person, place, and time. Psychiatric:         Attention and Perception: Attention normal.         Speech: Speech normal.         Behavior: Behavior is cooperative. An electronic signature was used to authenticate this note.     --ALANA Thurman - CRISTAL

## 2022-11-09 ENCOUNTER — HOSPITAL ENCOUNTER (OUTPATIENT)
Age: 67
Setting detail: SPECIMEN
Discharge: HOME | End: 2022-11-09

## 2022-11-09 ENCOUNTER — OFFICE VISIT (OUTPATIENT)
Dept: PRIMARY CARE CLINIC | Age: 67
End: 2022-11-09
Payer: COMMERCIAL

## 2022-11-09 VITALS
OXYGEN SATURATION: 98 % | DIASTOLIC BLOOD PRESSURE: 74 MMHG | WEIGHT: 161 LBS | TEMPERATURE: 97.9 F | SYSTOLIC BLOOD PRESSURE: 126 MMHG | HEART RATE: 88 BPM | BODY MASS INDEX: 25.22 KG/M2

## 2022-11-09 DIAGNOSIS — J02.9 SORE THROAT: ICD-10-CM

## 2022-11-09 DIAGNOSIS — K52.9 GASTROENTERITIS: ICD-10-CM

## 2022-11-09 DIAGNOSIS — R19.7 DIARRHEA, UNSPECIFIED TYPE: Primary | ICD-10-CM

## 2022-11-09 DIAGNOSIS — R68.83 CHILLS: ICD-10-CM

## 2022-11-09 LAB
INFLUENZA A ANTIBODY: NORMAL
INFLUENZA B ANTIBODY: NORMAL

## 2022-11-09 PROCEDURE — 1123F ACP DISCUSS/DSCN MKR DOCD: CPT | Performed by: PHYSICIAN ASSISTANT

## 2022-11-09 PROCEDURE — 87804 INFLUENZA ASSAY W/OPTIC: CPT | Performed by: PHYSICIAN ASSISTANT

## 2022-11-09 PROCEDURE — 99213 OFFICE O/P EST LOW 20 MIN: CPT | Performed by: PHYSICIAN ASSISTANT

## 2022-11-09 ASSESSMENT — ENCOUNTER SYMPTOMS
SINUS PAIN: 0
SORE THROAT: 1
SINUS PRESSURE: 0
VOMITING: 0
COUGH: 0
DIARRHEA: 1

## 2022-11-09 NOTE — PROGRESS NOTES
[place] [address]  [address] [facility]  Phone: [phone]  Fax: Steve Head    Pt Name: Baldemar Briggs  MRN: [id number]  Alfredogfrodney 1955  Date of evaluation: 11/9/2022  Provider: Juanito Norman Dr       Chief Complaint   Patient presents with    Diarrhea     Started Sunday with Diarrhea, then had terrible chills yesterday. She is afraid she has Covid and is afraid that she will get another UTI due to the diarrhea. Chills    Pharyngitis    Fatigue           HISTORY OF PRESENT ILLNESS  (Location/Symptom, Timing/Onset, Context/Setting, Quality, Duration, Modifying Factors, Severity.)   Baldemar Briggs is a 79 y.o. White (non-) [1] female who presents to the office for evaluation of      Diarrhea   This is a new problem. The current episode started in the past 7 days. The problem has been gradually improving. The stool consistency is described as Mucous. Associated symptoms include chills, headaches and myalgias. Pertinent negatives include no coughing, fever or vomiting. There are no known risk factors. She has tried bismuth subsalicylate for the symptoms. Nursing Notes were reviewed. REVIEW OF SYSTEMS    (2-9 systems for level 4, 10 or more for level 5)     Review of Systems   Constitutional:  Positive for chills and fatigue. Negative for fever. HENT:  Positive for congestion, postnasal drip and sore throat. Negative for ear discharge, ear pain, sinus pressure and sinus pain. Respiratory:  Negative for cough. Cardiovascular: Negative. Gastrointestinal:  Positive for diarrhea. Negative for vomiting. Genitourinary: Negative. Musculoskeletal:  Positive for myalgias. Skin: Negative. Neurological:  Positive for headaches. Except as noted above the remainder of the review of systems was reviewed andnegative. PAST MEDICAL HISTORY   History reviewed.     Past Medical History:   Diagnosis Date Arthritis     lower back and knee    Elevated cholesterol     on rx per pcp    Esophageal stricture     GERD (gastroesophageal reflux disease)     otc prilosec    Posterior vaginal wall prolapse     Snores     denies apnea    Vaginal enterocele     Wears prescription eyeglasses     Wellness examination     Thao WARNER  last seen 8/19/2020         SURGICAL HISTORY     History reviewed. Past Surgical History:   Procedure Laterality Date    BLADDER SUSPENSION  09/10/2020    LYNX MID URETHRAL SLING INSERTION     COLONOSCOPY      2019    COLPORRHAPHY  09/10/2020    posterior colporrhaphy    DILATION AND CURETTAGE      ENDOSCOPY, COLON, DIAGNOSTIC      2019 stricture w/ dilitation . [address]      left big toe hemangiomaremoved, benign    HYSTERECTOMY (CERVIX STATUS UNKNOWN)  02/2007    TVH withBSO , A&P repair    HYSTEROSCOPY  03/2007    novasure    LAPAROSCOPY  03/2007    BTL    URETHRAL SURGERY N/A 9/10/2020    LYNX MID URETHRAL SLING INSERTION performed by Salvador Madison MD at [address] 9/10/2020    POSTERIOR COLPORRHAPHY performed by Vanessa Cole MD at [facility]       Current Outpatient Medications   Medication Sig Dispense Refill    methenamine (HIPREX) 1 g tablet       atorvastatin (LIPITOR) 20 MG tablet Take 1 tablet by mouth in the morning. 90 tablet 1    Lactobacillus (CULTURELLE DIGESTIVE WOMENS) CAPS Take 1 capsule by mouth daily 30 capsule 3    vitamin D 25 MCG (1000 UT) CAPS Take by mouth      GLUCOSAMINE-CHONDROITIN ER PO Take by mouth daily      Ascorbic Acid (VITAMIN C) 1000 MG tablet Take 1,000 mg by mouth daily      Multiple Vitamins-Minerals (MULTIVITAMIN WOMEN 50+ PO) Take by mouth      D-Mannose 500 MG CAPS Take by mouth (Patient not taking: No sig reported)      fluconazole (DIFLUCAN) 150 MG tablet Take 1 tablet after antibiotic use. . Then take 1 in 7 days. . (Patient not taking: No sig reported) 2 tablet 1     No current facility-administered medications for this visit. ALLERGIES     Allegra [fexofenadine], Bactrim, Ciprofloxacin, Cvs caffeine [caffeine], Sulfa antibiotics, Trimethoprim, and Zocor [simvastatin]    FAMILY HISTORY           Problem Relation Age of Onset    Heart Disease Maternal Grandmother         CHF    Stroke Paternal Grandmother     Heart Disease Mother     Cancer Mother     Heart Disease Father     Cancer Father      Family Status   Relation Name Status    MGM      PGM  (Not Specified)    Mother      Father            SOCIAL HISTORY      reports that she has never smoked. She has never used smokeless tobacco. She reports that she does not drink alcohol and does not use drugs. PHYSICAL EXAM    (up to 7 for level 4, 8 or more for level 5)     Vitals:    22 0840   BP: 126/74   Site: Left Upper Arm   Position: Sitting   Cuff Size: Medium Adult   Pulse: 88   Temp: 97.9 °F (36.6 °C)   SpO2: 98%   Weight: 161 lb (73 kg)         Physical Exam  Vitals and nursing note reviewed. Constitutional:       General: She is not in acute distress. Appearance: Normal appearance. She is not ill-appearing. HENT:      Head: Normocephalic. Right Ear: External ear normal.      Left Ear: External ear normal.      Nose: Nose normal.      Mouth/Throat:      Mouth: Mucous membranes are moist.   Eyes:      Extraocular Movements: Extraocular movements intact. Conjunctiva/sclera: Conjunctivae normal.      Pupils: Pupils are equal, round, and reactive to light. Cardiovascular:      Rate and Rhythm: Normal rate. Pulmonary:      Effort: Pulmonary effort is normal.   Abdominal:      Palpations: Abdomen is soft. Tenderness: There is no abdominal tenderness. Skin:     General: Skin is warm and dry. Neurological:      Mental Status: She is alert and oriented to person, place, and time.          DIFFERENTIAL DIAGNOSIS:       Felipe reviewed the disposition diagnosis with the patient and or their family/guardian. I have answered their questions and given discharge instructions. They voiced understanding of these instructions and did not have anyfurther questions or complaints. PROCEDURES:  Orders Placed This Encounter   Procedures    COVID-19     Scheduling Instructions:      1) Due to current limited availability of the COVID-19 test, tests will be prioritized based on responses to questions above. Testing may be delayed due to volume. 2) Print and instruct patient to adhere to CDC home isolation program. (Link Above)              3) Set up or refer patient for a monitoring program.              4) Have patient sign up for and leverage MyChart (if not previously done). Order Specific Question:   Is this test for diagnosis or screening? Answer:   Diagnosis of ill patient     Order Specific Question:   Symptomatic for COVID-19 as defined by CDC? Answer:   Yes     Order Specific Question:   Date of Symptom Onset     Answer:   11/6/2022     Order Specific Question:   Hospitalized for COVID-19? Answer:   No     Order Specific Question:   Admitted to ICU for COVID-19? Answer:   No     Order Specific Question:   Employed in healthcare setting? Answer:   No     Order Specific Question:   Resident in a congregate (group) care setting? Answer:   No     Order Specific Question:   Pregnant? Answer:   No     Order Specific Question:   Previously tested for COVID-19?      Answer:   Yes    POCT Influenza A/B       Results for orders placed or performed in visit on 11/09/22   POCT Influenza A/B   Result Value Ref Range    Influenza A Ab neg     Influenza B Ab neg        FINALIMPRESSION      Visit Diagnoses and Associated Orders       Diarrhea, unspecified type    -  Primary    COVID-19 [[id number] Custom]      POCT Influenza A/B [19041 Custom]           Chills        COVID-19 [[id number] Custom]      POCT Influenza A/B [03891 Custom]           Sore throat        COVID-19 [[id number] Custom]      POCT Influenza A/B [87437 Custom]                     PLAN     Return if symptoms worsen or fail to improve. DISCHARGEMEDICATIONS:  No orders of the defined types were placed in this encounter. Plan:  Specimen sent for a culture. Possible treatment alteration based on the results. Based on the clinical exam findings and patient's reported symptoms, I do not suspect acute abdomen at this time. I believe that this is viral gastroenteritis based on the physical exam findings. I recommend prn Zofran as needed for the nausea. Encouraged to keep self hydrated by increasing fluid intake. Tylenol/Motrin for fever/discomfort. Patient agreeable to treatment plan. Educational materials provided on AVS.  Follow up if symptoms do not improve/worsen. Patient instructed to return to the office if symptoms worsen, return, or have any other concerns. Patient understands and is agreeable.          Esme Gardiner PA-C 11/9/2022 9:21 AM

## 2022-11-09 NOTE — LETTER
[place] [address]  [address] [place] [address]  Phone: [phone]  Fax: [phone]    Edel Rosy        November 9, 2022     Patient: Corina López   YOB: 1955   Date of Visit: 11/9/2022       To Whom it May Concern:    Sherry Arshad was seen in my clinic on 11/9/2022. She is to remain off pending COVID testing   If you have any questions or concerns, please don't hesitate to call.     Sincerely,         Braxton Nissen, PA-C

## 2022-11-10 LAB
SARS-COV-2: NORMAL
SARS-COV-2: NOT DETECTED
SOURCE: NORMAL

## 2022-11-14 ASSESSMENT — ENCOUNTER SYMPTOMS
SHORTNESS OF BREATH: 0
NAUSEA: 0
WHEEZING: 0
APNEA: 0
DIARRHEA: 0
RHINORRHEA: 0
CONSTIPATION: 0
CHEST TIGHTNESS: 0
SORE THROAT: 0
COLOR CHANGE: 0
VOMITING: 0
COUGH: 0
ABDOMINAL DISTENTION: 0
ABDOMINAL PAIN: 0

## 2023-01-05 ENCOUNTER — NURSE ONLY (OUTPATIENT)
Dept: FAMILY MEDICINE CLINIC | Age: 68
End: 2023-01-05
Payer: COMMERCIAL

## 2023-01-05 VITALS — OXYGEN SATURATION: 98 % | HEART RATE: 78 BPM | TEMPERATURE: 98.6 F

## 2023-01-05 DIAGNOSIS — Z23 NEEDS FLU SHOT: Primary | ICD-10-CM

## 2023-01-05 PROCEDURE — 90694 VACC AIIV4 NO PRSRV 0.5ML IM: CPT | Performed by: NURSE PRACTITIONER

## 2023-01-05 PROCEDURE — 90471 IMMUNIZATION ADMIN: CPT | Performed by: NURSE PRACTITIONER

## 2023-01-05 NOTE — PROGRESS NOTES
Pt is here for flu vaccination. Pt presents with no complaints. Flu vaccine given in left arm. Tolerated immunization well.

## 2023-02-20 ENCOUNTER — HOSPITAL ENCOUNTER (OUTPATIENT)
Age: 68
Setting detail: SPECIMEN
Discharge: HOME | End: 2023-02-20

## 2023-02-20 ENCOUNTER — OFFICE VISIT (OUTPATIENT)
Dept: PRIMARY CARE CLINIC | Age: 68
End: 2023-02-20
Payer: COMMERCIAL

## 2023-02-20 VITALS
DIASTOLIC BLOOD PRESSURE: 86 MMHG | HEART RATE: 61 BPM | WEIGHT: 161 LBS | OXYGEN SATURATION: 97 % | BODY MASS INDEX: 25.22 KG/M2 | TEMPERATURE: 97.9 F | SYSTOLIC BLOOD PRESSURE: 122 MMHG

## 2023-02-20 DIAGNOSIS — E78.5 HYPERLIPIDEMIA, UNSPECIFIED HYPERLIPIDEMIA TYPE: ICD-10-CM

## 2023-02-20 DIAGNOSIS — R73.03 PRE-DIABETES: ICD-10-CM

## 2023-02-20 DIAGNOSIS — H00.11 CHALAZION OF RIGHT UPPER EYELID: Primary | ICD-10-CM

## 2023-02-20 LAB
ALBUMIN SERPL-MCNC: 4.5 G/DL (ref 3.5–5.2)
ALBUMIN/GLOBULIN RATIO: 1.7 (ref 1–2.5)
ALP SERPL-CCNC: 116 U/L (ref 35–104)
ALT SERPL-CCNC: 22 U/L (ref 5–33)
ANION GAP SERPL CALCULATED.3IONS-SCNC: 18 MMOL/L (ref 9–17)
AST SERPL-CCNC: 26 U/L
BILIRUB SERPL-MCNC: 0.7 MG/DL (ref 0.3–1.2)
BUN SERPL-MCNC: 15 MG/DL (ref 8–23)
CALCIUM SERPL-MCNC: 9.7 MG/DL (ref 8.6–10.4)
CHLORIDE SERPL-SCNC: 107 MMOL/L (ref 98–107)
CHOLEST SERPL-MCNC: 168 MG/DL
CHOLESTEROL/HDL RATIO: 3.8
CO2 SERPL-SCNC: 22 MMOL/L (ref 20–31)
CREAT SERPL-MCNC: 0.7 MG/DL (ref 0.5–0.9)
EST. AVERAGE GLUCOSE BLD GHB EST-MCNC: 117 MG/DL
GFR SERPL CREATININE-BSD FRML MDRD: >60 ML/MIN/1.73M2
GLUCOSE SERPL-MCNC: 97 MG/DL (ref 70–99)
HBA1C MFR BLD: 5.7 % (ref 4–6)
HCT VFR BLD AUTO: 44.3 % (ref 36.3–47.1)
HDLC SERPL-MCNC: 44 MG/DL
HGB BLD-MCNC: 14 G/DL (ref 11.9–15.1)
LDLC SERPL CALC-MCNC: 104 MG/DL (ref 0–130)
MCH RBC QN AUTO: 28.7 PG (ref 25.2–33.5)
MCHC RBC AUTO-ENTMCNC: 31.6 G/DL (ref 28.4–34.8)
MCV RBC AUTO: 91 FL (ref 82.6–102.9)
NRBC AUTOMATED: 0 PER 100 WBC
PDW BLD-RTO: 13.2 % (ref 11.8–14.4)
PLATELET # BLD AUTO: 185 K/UL (ref 138–453)
PMV BLD AUTO: 12.6 FL (ref 8.1–13.5)
POTASSIUM SERPL-SCNC: 4.3 MMOL/L (ref 3.7–5.3)
PROT SERPL-MCNC: 7.1 G/DL (ref 6.4–8.3)
RBC # BLD: 4.87 M/UL (ref 3.95–5.11)
SODIUM SERPL-SCNC: 147 MMOL/L (ref 135–144)
TRIGL SERPL-MCNC: 98 MG/DL
WBC # BLD AUTO: 6.1 K/UL (ref 3.5–11.3)

## 2023-02-20 PROCEDURE — 99214 OFFICE O/P EST MOD 30 MIN: CPT | Performed by: FAMILY MEDICINE

## 2023-02-20 PROCEDURE — 1123F ACP DISCUSS/DSCN MKR DOCD: CPT | Performed by: FAMILY MEDICINE

## 2023-02-20 NOTE — PROGRESS NOTES
Subjective:  Guyann Pallas presents for   Chief Complaint   Patient presents with    Eye Problem     Right eye swollen and painful. Started 2 days ago. It started 2 days ago. No discharge    Vision is fine    Tried nothing for it    Does not wear contacts. Patient Active Problem List   Diagnosis    Rectocele    Stress incontinence    Posterior colporrhaphy 9/10/2020    Post-op pain    Hyperlipidemia    Gastroesophageal reflux disease without esophagitis    Urinary tract infection, site not specified    Prolapse of vaginal cuff after hysterectomy    Presence of pessary       Objective:  Physical Exam   Vitals: Wt Readings from Last 3 Encounters:   02/20/23 161 lb (73 kg)   11/09/22 161 lb (73 kg)   11/03/22 161 lb 9.6 oz (73.3 kg)     Ht Readings from Last 3 Encounters:   10/08/22 5' 7\" (1.702 m)   07/11/22 5' 7\" (1.702 m)   02/23/22 5' 7\" (1.702 m)     Body mass index is 25.22 kg/m². Vitals:    02/20/23 0951   BP: 122/86   Site: Left Upper Arm   Position: Sitting   Cuff Size: Medium Adult   Pulse: 61   Temp: 97.9 °F (36.6 °C)   SpO2: 97%   Weight: 161 lb (73 kg)       Constitutional: She is oriented to person, place, and time. She appears well-developed and well-nourished and in no acute distress. Answers all my questions appropriately. Head: Normocephalic and atraumatic. Eyes:conjunctiva appear normal on the left. Right lower conjunctiva is normal.  Slight irritation of right upper eyelid and small chalazion is presnent. With her eyelids closed you can see the chalzoion . PERRLA and EOMI in both eyes      Assessment:   Encounter Diagnosis   Name Primary? Chalazion of right upper eyelid Yes         Plan:     Discussed the patholphysiology of the chalazion. It is more of a mechanilc irritation than infection. She does not need antibitoics at this time    Use warm compresses (or any heat) QID for 30 mintues    Advised these usually only slowly resolve on their own.     There are no discontinued medications. THE ABOVE NOTED DISCONTINUED MEDS MAY ONLY BE FROM 'CLEANING UP' THE MED LIST AND WERE NOT ACTUALLY CANCELED;  SEE CHART FOR DETAILS! No orders of the defined types were placed in this encounter. No orders of the defined types were placed in this encounter. Return in about 2 weeks (around 3/6/2023). There are no Patient Instructions on file for this visit.   Follow up with your provider

## 2023-02-27 ENCOUNTER — HOSPITAL ENCOUNTER (OUTPATIENT)
Dept: GENERAL RADIOLOGY | Facility: CLINIC | Age: 68
Discharge: HOME | End: 2023-03-01
Payer: COMMERCIAL

## 2023-02-27 ENCOUNTER — OFFICE VISIT (OUTPATIENT)
Dept: PRIMARY CARE CLINIC | Age: 68
End: 2023-02-27
Payer: COMMERCIAL

## 2023-02-27 ENCOUNTER — HOSPITAL ENCOUNTER (OUTPATIENT)
Facility: CLINIC | Age: 68
Discharge: HOME | End: 2023-03-01
Payer: COMMERCIAL

## 2023-02-27 VITALS
HEART RATE: 80 BPM | TEMPERATURE: 98.2 F | WEIGHT: 161 LBS | DIASTOLIC BLOOD PRESSURE: 86 MMHG | OXYGEN SATURATION: 100 % | SYSTOLIC BLOOD PRESSURE: 146 MMHG | BODY MASS INDEX: 25.22 KG/M2

## 2023-02-27 DIAGNOSIS — M25.562 LEFT KNEE PAIN, UNSPECIFIED CHRONICITY: Primary | ICD-10-CM

## 2023-02-27 DIAGNOSIS — M25.562 LEFT KNEE PAIN, UNSPECIFIED CHRONICITY: ICD-10-CM

## 2023-02-27 PROCEDURE — 99214 OFFICE O/P EST MOD 30 MIN: CPT | Performed by: FAMILY MEDICINE

## 2023-02-27 PROCEDURE — 73560 X-RAY EXAM OF KNEE 1 OR 2: CPT

## 2023-02-27 PROCEDURE — 1123F ACP DISCUSS/DSCN MKR DOCD: CPT | Performed by: FAMILY MEDICINE

## 2023-02-27 RX ORDER — DICLOFENAC SODIUM 75 MG/1
75 TABLET, DELAYED RELEASE ORAL 2 TIMES DAILY WITH MEALS
Qty: 60 TABLET | Refills: 0 | Status: SHIPPED | OUTPATIENT
Start: 2023-02-27

## 2023-02-27 NOTE — PROGRESS NOTES
Subjective:  Terence Pedro presents for   Chief Complaint   Patient presents with    Knee Pain     Left knee pain. She thinks she hyper-extended it at work. She is not going to report for workmans comp. She has been icing it, taking ibuprofen. Doesn't reemember any specific incident    Is very stiff after sitting for a while. Then she can walk and it improves    Has treid ice and tyelnol. No help    Tried topical biofreeze and it helped a litlle    Hx of GERD - well contorlled    No hx of eshopagiti     No clicking heard    Has known oa of the right knee    Patient Active Problem List   Diagnosis    Rectocele    Stress incontinence    Posterior colporrhaphy 9/10/2020    Post-op pain    Hyperlipidemia    Gastroesophageal reflux disease without esophagitis    Urinary tract infection, site not specified    Prolapse of vaginal cuff after hysterectomy    Presence of pessary     Objective:  Physical Exam   Vitals: Wt Readings from Last 3 Encounters:   02/27/23 161 lb (73 kg)   02/20/23 161 lb (73 kg)   11/09/22 161 lb (73 kg)     Ht Readings from Last 3 Encounters:   10/08/22 5' 7\" (1.702 m)   07/11/22 5' 7\" (1.702 m)   02/23/22 5' 7\" (1.702 m)     Body mass index is 25.22 kg/m². Vitals:    02/27/23 1558   BP: (!) 146/86   Site: Left Upper Arm   Position: Sitting   Cuff Size: Medium Adult   Pulse: 80   Temp: 98.2 °F (36.8 °C)   SpO2: 100%   Weight: 161 lb (73 kg)       Constitutional: She is oriented to person, place, and time. She appears well-developed and well-nourished and in no acute distress. Answers all my questions appropriately. Knees - to visual exam , the right knee actually looks swollen comprared to  the left. Anil Armenta does have palpabel discomfort along the left joint line  neg lachmans sign. Unable to squat with that knee due to discomfort. No patella tnednerness      Assessment:   Encounter Diagnosis   Name Primary?     Left knee pain, unspecified chronicity Yes         Plan:     Use heat QID for 30 minutes  Use topicals    There are no discontinued medications. THE ABOVE NOTED DISCONTINUED MEDS MAY ONLY BE FROM 'CLEANING UP' THE MED LIST AND WERE NOT ACTUALLY CANCELED;  SEE CHART FOR DETAILS! Orders Placed This Encounter   Medications    diclofenac (VOLTAREN) 75 MG EC tablet     Sig: Take 1 tablet by mouth 2 times daily (with meals)     Dispense:  60 tablet     Refill:  0     Orders Placed This Encounter   Procedures    XR KNEE LEFT (1-2 VIEWS)     Standing Status:   Future     Standing Expiration Date:   2/27/2024    XR KNEE RIGHT (1-2 VIEWS)     Standing Status:   Future     Standing Expiration Date:   2/27/2024     Return in about 2 weeks (around 3/13/2023). There are no Patient Instructions on file for this visit.   Follow up with your provider

## 2023-03-09 ENCOUNTER — OFFICE VISIT (OUTPATIENT)
Dept: FAMILY MEDICINE CLINIC | Age: 68
End: 2023-03-09
Payer: COMMERCIAL

## 2023-03-09 VITALS
OXYGEN SATURATION: 97 % | SYSTOLIC BLOOD PRESSURE: 118 MMHG | TEMPERATURE: 97.5 F | RESPIRATION RATE: 16 BRPM | WEIGHT: 160.4 LBS | DIASTOLIC BLOOD PRESSURE: 84 MMHG | BODY MASS INDEX: 25.12 KG/M2 | HEART RATE: 77 BPM

## 2023-03-09 DIAGNOSIS — G89.29 CHRONIC PAIN OF LEFT ANKLE: ICD-10-CM

## 2023-03-09 DIAGNOSIS — M25.562 CHRONIC PAIN OF LEFT KNEE: Primary | ICD-10-CM

## 2023-03-09 DIAGNOSIS — M25.572 CHRONIC PAIN OF LEFT ANKLE: ICD-10-CM

## 2023-03-09 DIAGNOSIS — G89.29 CHRONIC PAIN OF LEFT KNEE: Primary | ICD-10-CM

## 2023-03-09 PROCEDURE — 1123F ACP DISCUSS/DSCN MKR DOCD: CPT | Performed by: PEDIATRICS

## 2023-03-09 PROCEDURE — 99213 OFFICE O/P EST LOW 20 MIN: CPT | Performed by: PEDIATRICS

## 2023-03-09 RX ORDER — MAGNESIUM 200 MG
200 TABLET ORAL DAILY
COMMUNITY

## 2023-03-09 RX ORDER — OMEPRAZOLE 20 MG/1
20 CAPSULE, DELAYED RELEASE ORAL DAILY
COMMUNITY

## 2023-03-09 ASSESSMENT — ENCOUNTER SYMPTOMS
STRIDOR: 0
SHORTNESS OF BREATH: 0
DIARRHEA: 1
COLOR CHANGE: 0
WHEEZING: 0
COUGH: 0
CONSTIPATION: 0
ABDOMINAL PAIN: 0

## 2023-03-09 ASSESSMENT — PATIENT HEALTH QUESTIONNAIRE - PHQ9
SUM OF ALL RESPONSES TO PHQ QUESTIONS 1-9: 0
2. FEELING DOWN, DEPRESSED OR HOPELESS: 0
1. LITTLE INTEREST OR PLEASURE IN DOING THINGS: 0
SUM OF ALL RESPONSES TO PHQ9 QUESTIONS 1 & 2: 0
SUM OF ALL RESPONSES TO PHQ QUESTIONS 1-9: 0

## 2023-03-09 NOTE — PROGRESS NOTES
Octavio Helms (:  1955) is a 79 y.o. female,Established patient, here for evaluation of the following chief complaint(s):    ED Follow-up [facility] ED XR previously with walk in. Osteoarthritis severe- LT leg. US completed at [facility]. Had a fall when she got home from the ER. Hx of fractured ankle to that leg- 4 years ago.)         ASSESSMENT/PLAN:    1. Chronic pain of left knee  -     External Referral To Physical Therapy  -     [address] [place] [address], , Orthopedic Surgery, [place]  2. Chronic pain of left ankle  -     External Referral To Physical Therapy  -     [address] [place] [address], , Orthopedic Surgery, [place]      Reviewed recent x-rays  Reviewed recent Doppler ultrasound  Continue using cane for stability  Recommend bracing until evaluated by Ortho  Continue diclofenac short-term  Referral to physical therapy  Referral to Ortho for urgent evaluation  Call with concerns        Return if symptoms worsen or fail to improve. Subjective     HPI    Patient presents today for evaluation of left leg pain that started approximately 1 month ago. She denied any injury. She states that she noticed some ankle pain initially and this started to radiate up her left leg ultimately ended up in the knee. She did come to the walk-in clinic for evaluation. X-rays were done and her left knee x-ray showed a small joint effusion and moderate to severe narrowing of the patellofemoral compartment. There was no fracture or dislocation. A right knee x-ray was also done which showed degenerative changes of the medial and patellofemoral compartments. She was given a prescription for Voltaren which she actually just started this past Monday. She is not sure if this has been helpful. She had persistent symptoms so she did go to the emergency room at [facility] where they did do a lower extremity Doppler which was negative for DVT.   She is having difficulty bearing weight on this leg and has been using a cane because of this. She primarily complains of right medial knee pain that radiates just to below the knee. cmw      Review of Systems   Constitutional:  Negative for appetite change, fatigue and fever. Respiratory:  Negative for cough, shortness of breath, wheezing and stridor. Cardiovascular:  Negative for chest pain, palpitations and leg swelling. Gastrointestinal:  Positive for diarrhea (from diclofenac). Negative for abdominal pain and constipation. Endocrine: Negative for polydipsia, polyphagia and polyuria. Genitourinary:  Negative for dysuria and hematuria. Musculoskeletal:  Positive for arthralgias and gait problem. Skin:  Negative for color change and rash. Allergic/Immunologic: Negative for immunocompromised state. Hematological:  Negative for adenopathy. Does not bruise/bleed easily. Objective     Physical Exam  Vitals and nursing note reviewed. Constitutional:       General: She is not in acute distress. Appearance: Normal appearance. She is normal weight. She is not ill-appearing, toxic-appearing or diaphoretic. Eyes:      General: No scleral icterus. Right eye: No discharge. Extraocular Movements: Extraocular movements intact. Pupils: Pupils are equal, round, and reactive to light. Pulmonary:      Effort: Pulmonary effort is normal.   Musculoskeletal:      Right knee: Deformity and crepitus present. Normal alignment. Left knee: Swelling, deformity, effusion and crepitus present. Decreased range of motion. Tenderness present over the medial joint line and MCL. Normal alignment. Left ankle: Swelling (mild) and deformity (OA deformity) present. No tenderness. Normal range of motion. Legs:    Neurological:      Mental Status: She is alert. An electronic signature was used to authenticate this note.     --Yazan Banerjee MD

## 2023-03-09 NOTE — LETTER
March 9, 2023       Raymon Dacosta YOB: 1955   [address]  [facility] Date of Visit:  3/9/2023       To Whom It May Concern: It is my medical opinion that Pete Dobbins may return to work on 3/20/2023. Please excuse her the week of 3/14/2023 through 3/17/2023. If you have any questions or concerns, please don't hesitate to call.     Sincerely,        Derrick Hernandez MD

## 2023-03-13 ENCOUNTER — OFFICE VISIT (OUTPATIENT)
Dept: ORTHOPEDIC SURGERY | Age: 68
End: 2023-03-13
Payer: COMMERCIAL

## 2023-03-13 VITALS — WEIGHT: 160 LBS | HEIGHT: 67 IN | BODY MASS INDEX: 25.11 KG/M2 | RESPIRATION RATE: 14 BRPM

## 2023-03-13 DIAGNOSIS — M17.12 PRIMARY OSTEOARTHRITIS OF LEFT KNEE: ICD-10-CM

## 2023-03-13 DIAGNOSIS — M25.562 ACUTE PAIN OF LEFT KNEE: Primary | ICD-10-CM

## 2023-03-13 PROCEDURE — 1123F ACP DISCUSS/DSCN MKR DOCD: CPT | Performed by: PHYSICIAN ASSISTANT

## 2023-03-13 PROCEDURE — 99204 OFFICE O/P NEW MOD 45 MIN: CPT | Performed by: PHYSICIAN ASSISTANT

## 2023-03-13 RX ORDER — DICLOFENAC SODIUM 75 MG/1
75 TABLET, DELAYED RELEASE ORAL 2 TIMES DAILY WITH MEALS
Qty: 60 TABLET | Refills: 0 | Status: SHIPPED | OUTPATIENT
Start: 2023-03-13

## 2023-03-13 NOTE — PATIENT INSTRUCTIONS
Patient Education        Knee: Exercises  Introduction  Here are some examples of exercises for you to try. The exercises may be suggested for a condition or for rehabilitation. Start each exercise slowly. Ease off the exercises if you start to have pain. You will be told when to start these exercises and which ones will work best for you. How to do the exercises  Quad sets  Sit with your leg straight and supported on the floor or a firm bed. (If you feel discomfort in the front or back of your knee, place a small towel roll under your knee.)  Tighten the muscles on top of your thigh by pressing the back of your knee flat down to the floor. (If you feel discomfort under your kneecap, place a small towel roll under your knee.)  Hold for about 6 seconds, then rest for up to 10 seconds. Do 8 to 12 repetitions several times a day. Straight-leg raises to the front  Lie on your back with your good knee bent so that your foot rests flat on the floor. Your injured leg should be straight. Make sure that your low back has a normal curve. You should be able to slip your flat hand in between the floor and the small of your back, with your palm touching the floor and your back touching the back of your hand. Tighten the thigh muscles in the injured leg by pressing the back of your knee flat down to the floor. Hold your knee straight. Keeping the thigh muscles tight, lift your injured leg up so that your heel is about 12 inches off the floor. Hold for about 6 seconds and then lower slowly. Do 8 to 12 repetitions, 3 times a day. Straight-leg raises to the outside  Lie on your side, with your injured leg on top. Tighten the front thigh muscles of your injured leg to keep your knee straight. Keep your hip and your leg straight in line with the rest of your body, and keep your knee pointing forward. Do not drop your hip back. Lift your injured leg straight up toward the ceiling, about 12 inches off the floor.  Hold for about 6 seconds, then slowly lower your leg. Do 8 to 12 repetitions. Straight-leg raises to the back  Lie on your stomach, and lift your leg straight up behind you (toward the ceiling). Lift your toes about 6 inches off the floor, hold for about 6 seconds, then lower slowly. Do 8 to 12 repetitions. Straight-leg raises to the inside  Lie on the side of your body with the injured leg. You can either prop your other (good) leg up on a chair, or you can bend your good knee and put that foot in front of your injured knee. Do not drop your hip back. Tighten the muscles on the front of your thigh to straighten your injured knee. Keep your kneecap pointing forward, and lift your whole leg up toward the ceiling about 6 inches. Hold for about 6 seconds, then lower slowly. Do 8 to 12 repetitions. Heel dig bridging  Lie on your back with both knees bent and your ankles bent so that only your heels are digging into the floor. Your knees should be bent about 90 degrees. Then push your heels into the floor, squeeze your buttocks, and lift your hips off the floor until your shoulders, hips, and knees are all in a straight line. Hold for about 6 seconds as you continue to breathe normally, and then slowly lower your hips back down to the floor and rest for up to 10 seconds. Do 8 to 12 repetitions. Hamstring curls  Lie on your stomach with your knees straight. If your kneecap is uncomfortable, roll up a washcloth and put it under your leg just above your kneecap. Lift the foot of your injured leg by bending the knee so that you bring the foot up toward your buttock. If this motion hurts, try it without bending your knee quite as far. This may help you avoid any painful motion. Slowly lower your leg back to the floor. Do 8 to 12 repetitions. With permission from your doctor or physical therapist, you may also want to add a cuff weight to your ankle (not more than 5 pounds).  With weight, you do not have to lift your leg more than 12 inches to get a hamstring workout. Shallow standing knee bends  Do this exercise only if you have very little pain; if you have no clicking, locking, or giving way if you have an injured knee; and if it does not hurt while you are doing 8 to 12 repetitions. Stand with your hands lightly resting on a counter or chair in front of you. Put your feet shoulder-width apart. Slowly bend your knees so that you squat down like you are going to sit in a chair. Make sure your knees do not go in front of your toes. Lower yourself about 6 inches. Your heels should remain on the floor at all times. Rise slowly to a standing position. Heel raises  Stand with your feet a few inches apart, with your hands lightly resting on a counter or chair in front of you. Slowly raise your heels off the floor while keeping your knees straight. Hold for about 6 seconds, then slowly lower your heels to the floor. Do 8 to 12 repetitions several times during the day. Follow-up care is a key part of your treatment and safety. Be sure to make and go to all appointments, and call your doctor if you are having problems. It's also a good idea to know your test results and keep a list of the medicines you take. Current as of: March 9, 2022               Content Version: 13.5  © 2006-2022 [employer]. Care instructions adapted under license by [facility] ([facility]). If you have questions about a medical condition or this instruction, always ask your healthcare professional. [place] [address] any warranty or liability for your use of this information.

## 2023-03-13 NOTE — PROGRESS NOTES
[address] [facility], [address] [place] [address], [address] [place] [address], [id number] [place] [address]           Dept Phone: [phone]           Dept Fax:  [phone] [address] [facility]           [place]          Dept Phone: [phone]           Dept Fax:  [phone]      Chief Compliant:  Chief Complaint   Patient presents with    Knee Pain     Left        History of Present Illness: This is a 79 y.o. female who presents to the clinic today for evaluation of had concerns including Knee Pain (Left). Ms. Taniya Artis is a 40-year-old female who presents for evaluation of 1 month history of left knee pain. She does not recall any specific injury or trauma around that time but does note she had a fall about 4 years ago in which she broke her left ankle which was treated nonoperatively in a walking boot. She reports she has had intermittent issues with the left lower leg including the ankle and knee but this pain has been constant over the last 1 month and feels slightly different. Localized pain most severely to the medial and posterior aspect of the left knee. She was actually evaluated at local urgent care where she had weightbearing x-rays of the left knee which demonstrated osteoarthritis but no evidence of acute fracture. An ultrasound was done and ruled out DVT. Patient was seen by PCP and actually started on diclofenac 75 mg twice daily and states that this is actually helping with her pain quite a bit and she is overall feeling better today. Patient has been out of work for the last several days and anticipating on returning on Monday, 3/20/2022 requesting a note as she reports her knee pain is feeling better.        Past History:    Current Outpatient Medications:     magnesium 200 MG TABS tablet, Take 200 mg by mouth daily, Disp: , Rfl:     omeprazole (PRILOSEC) 20 MG delayed release capsule, Take 20 mg by mouth daily, Disp: , Rfl:     diclofenac (VOLTAREN) 75 MG EC tablet, Take 1 tablet by mouth 2 times daily (with meals), Disp: 60 tablet, Rfl: 0    methenamine (HIPREX) 1 g tablet, Take 1 g by mouth daily, Disp: , Rfl:     D-Mannose 500 MG CAPS, Take by mouth (Patient not taking: No sig reported), Disp: , Rfl:     fluconazole (DIFLUCAN) 150 MG tablet, Take 1 tablet after antibiotic use. . Then take 1 in 7 days. . (Patient not taking: No sig reported), Disp: 2 tablet, Rfl: 1    atorvastatin (LIPITOR) 20 MG tablet, Take 1 tablet by mouth in the morning., Disp: 90 tablet, Rfl: 1    Lactobacillus (CULTURELLE DIGESTIVE WOMENS) CAPS, Take 1 capsule by mouth daily, Disp: 30 capsule, Rfl: 3    vitamin D 25 MCG (1000 UT) CAPS, Take by mouth, Disp: , Rfl:     GLUCOSAMINE-CHONDROITIN ER PO, Take by mouth daily, Disp: , Rfl:     Ascorbic Acid (VITAMIN C) 1000 MG tablet, Take 1,000 mg by mouth daily, Disp: , Rfl:     Multiple Vitamins-Minerals (MULTIVITAMIN WOMEN 50+ PO), Take by mouth, Disp: , Rfl:   Allergies   Allergen Reactions    Allegra [Fexofenadine]      Other reaction(s): Unknown    Bactrim Other (See Comments)     Palpitations      Ciprofloxacin     Cvs Caffeine [Caffeine] Other (See Comments)    Sulfa Antibiotics     Trimethoprim      Other reaction(s): Unknown    Zocor [Simvastatin] Other (See Comments)     Muscle cramps     Social History     Socioeconomic History    Marital status:       Spouse name: Not on file    Number of children: 3    Years of education: Not on file    Highest education level: Not on file   Occupational History    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   Tobacco Use    Smoking status: Never    Smokeless tobacco: Never   Vaping Use    Vaping Use: Never used   Substance and Sexual Activity    Alcohol use: No    Drug use: No    Sexual activity: Not Currently   Other Topics Concern    Not on file   Social History Narrative    Not on file     Social Determinants of Health Financial Resource Strain: Low Risk     Difficulty of Paying Living Expenses: Not hard at all   Food Insecurity: No Food Insecurity    Worried About Running Out of Food in the Last Year: Never true    Ran Out of Food in the Last Year: Never true   Transportation Needs: Not on file   Physical Activity: Not on file   Stress: Not on file   Social Connections: Not on file   Intimate Partner Violence: Not on file   Housing Stability: Not on file     Past Medical History:   Diagnosis Date    Arthritis     lower back and knee    Elevated cholesterol     on rx per pcp    Esophageal stricture     GERD (gastroesophageal reflux disease)     otc prilosec    Posterior vaginal wall prolapse     Snores     denies apnea    Vaginal enterocele     Wears prescription eyeglasses     Wellness examination     Ebb Kelly APRN  last seen 8/19/2020     Past Surgical History:   Procedure Laterality Date    BLADDER SUSPENSION  09/10/2020    LYNX MID URETHRAL SLING INSERTION     COLONOSCOPY      2019    COLPORRHAPHY  09/10/2020    posterior colporrhaphy    DILATION AND CURETTAGE      ENDOSCOPY, COLON, DIAGNOSTIC      2019 stricture w/ dilitation . [address] 55      left big toe hemangiomaremoved, benign    HYSTERECTOMY (CERVIX STATUS UNKNOWN)  02/2007    TVH withBSO , A&P repair    HYSTEROSCOPY  03/2007    novasure    LAPAROSCOPY  03/2007    BTL    URETHRAL SURGERY N/A 9/10/2020    LYNX MID URETHRAL SLING INSERTION performed by Dom Torres MD at [address]  9/10/2020    POSTERIOR COLPORRHAPHY performed by Patricia Palacio MD at [address] [facility] History   Problem Relation Age of Onset    Heart Disease Maternal Grandmother         CHF    Stroke Paternal Grandmother     Heart Disease Mother     Cancer Mother     Heart Disease Father     Cancer Father         Review of Systems   Constitutional: Negative for fever, chills, sweats. Eyes: Negative for changes in vision, or pain.    HENT: Negative for ear ache, epistaxis, or sore throat. Respiratory/Cardio: Negative for Chest pain, palpitations, SOB, or cough. Gastrointestinal: Negative for abdominal pain, N/V/D. Genitourinary: Negative for dysuria, frequency, urgency, or hematuria. Neurological: Negative for headache, numbness, or weakness. Integumentary: Negative for rash, itching, laceration, or abrasion. Musculoskeletal: Positive for Knee Pain (Left)       Physical Exam:  Constitutional: Patient is oriented to person, place, and time. Patient appears well-developed and well nourished. HENT: Negative otherwise noted  Head: Normocephalic and Atraumatic  Nose: Normal  Eyes: Conjunctivae and EOM are normal  Neck: Normal range of motion Neck supple. Respiratory/Cardio: Effort normal. No respiratory distress. Musculoskeletal:    Left Knee:     Skin: warm and dry, no rash or erythema  Vasculature: 2+ pedal pulses bilaterally  Neuro: Sensation grossly intact to light touch diffusely  Alignment: Normal  Tenderness: Minimal tenderness to medial joint line. No tenderness to quad/patellar tendon, pes anserine bursa or posterior knee. Effusion: Small    ROM: (Degrees)       A P       Extension  0 0       Flexion   120 125       Crepitation  Yes       Muscle strength:         Flexion   5      Extension  5      SLR   5        Extensor lag   y          Special testing:  y    Pain with deep knee flexion     y    Patellar grind       y    Patellar apprehension      n    Patellar glide         n    Lachman       n    Anterior drawer      n    Pivot shift       n    Posterior drawer      n    Dial test       n    Posterolateral drawer      n    Posterior Sag       n    MCL        n    LCL          y    Medial joint line tenderness     n    Lateral joint line tenderness     n    McMurrey's         Neurological: Patient is alert and oriented to person, place, and time. Normal strenght. No sensory deficit.   Skin: Skin is warm and dry  Psychiatric: Behavior is normal. Thought content normal.  Nursing note and vitals reviewed. Labs and Imaging:     XR KNEE RIGHT (1-2 VIEWS)  Narrative: EXAMINATION:  TWO XRAY VIEWS OF THE LEFT KNEE; TWO XRAY VIEWS OF THE RIGHT KNEE    2/27/2023 5:26 pm    COMPARISON:  None. HISTORY:  ORDERING SYSTEM PROVIDED HISTORY: Left knee pain, unspecified chronicity  TECHNOLOGIST PROVIDED HISTORY:  Reason for Exam: Chronic knee pain with left hurting more than right- pt  states a lot of twisting moves at work and 4 yrs ago she fell onto right knee  and broke left ankle. Has had issues ever since that fall. 59-year-old female with bilateral knee pain    FINDINGS:  Right knee:    No sizable joint effusion is identified. Osseous alignment is normal.  Moderate narrowing and osteophyte spurring of  the medial compartment. Moderate to severe narrowing of the patellofemoral  compartment. No acute fracture or gross dislocation is seen. No suspicious  osteolytic or osteoblastic lesions are identified. Left knee:    Small joint effusion. Osseous alignment is normal.  Moderate to severe narrowing of the  patellofemoral compartment. No acute fracture or gross dislocation is seen. No suspicious osteolytic or osteoblastic lesions are identified. Impression: Right knee:    1. Degenerative changes of the medial and patellofemoral compartments. 2. No acute fracture or dislocation. Left knee:    1. Small joint effusion. 2. Moderate to severe narrowing of the patellofemoral compartment. 3. No acute fracture or dislocation. XR KNEE LEFT (1-2 VIEWS)  Narrative: EXAMINATION:  TWO XRAY VIEWS OF THE LEFT KNEE; TWO XRAY VIEWS OF THE RIGHT KNEE    2/27/2023 5:26 pm    COMPARISON:  None.     HISTORY:  ORDERING SYSTEM PROVIDED HISTORY: Left knee pain, unspecified chronicity  TECHNOLOGIST PROVIDED HISTORY:  Reason for Exam: Chronic knee pain with left hurting more than right- pt  states a lot of twisting moves at work and 4 yrs ago she fell onto right knee  and broke left ankle. Has had issues ever since that fall. 71-year-old female with bilateral knee pain    FINDINGS:  Right knee:    No sizable joint effusion is identified. Osseous alignment is normal.  Moderate narrowing and osteophyte spurring of  the medial compartment. Moderate to severe narrowing of the patellofemoral  compartment. No acute fracture or gross dislocation is seen. No suspicious  osteolytic or osteoblastic lesions are identified. Left knee:    Small joint effusion. Osseous alignment is normal.  Moderate to severe narrowing of the  patellofemoral compartment. No acute fracture or gross dislocation is seen. No suspicious osteolytic or osteoblastic lesions are identified. Impression: Right knee:    1. Degenerative changes of the medial and patellofemoral compartments. 2. No acute fracture or dislocation. Left knee:    1. Small joint effusion. 2. Moderate to severe narrowing of the patellofemoral compartment. 3. No acute fracture or dislocation. No new x-rays taken today however those from 2/27/2023 available for independent review left knee with fairly advanced patellofemoral narrowing with tricompartmental osteophytosis. Medial compartment of the left knee with mild narrowing and spurring. No evidence of acute fracture. No orders of the defined types were placed in this encounter. Assessment and Plan:  1. Acute pain of left knee    2. Primary osteoarthritis of left knee          PLAN:  Hernán Beal is a 79 y.o. old female with left knee osteoarthritis. Current pain is consistent with osteoarthritic flare. Also considered the differential includes MCL sprain, medial meniscus tear, medial hamstring sprain as well as acute fracture. .   I had a discussion with the patient with regards to the nature and extent of her problem. We also discussed treatment options available to her including non-operative and operative intervention.  To this end we discussed use of NSAIDs, cortisone and viscosupplementation injections, weight loss, activity modification, physical therapy, bracing, and use of assistive walking devices. As outlined above she has attempted treatment to include use of diclofenac 75 mg twice daily which continues to provide adequate relief. At this time she would like to proceed with over-the-counter bracing, refill on diclofenac and will plan to return to work on 3/20/2023. Follow-up in 4 weeks however patient may call or concern for any questions or concerns. Electronically signed by TASIA Purcell on 3/13/23 at 8:30 AM EDT        Please note that this chart was generated using voice recognition Dragon dictation software. Although every effort was made to ensure the accuracy of this automated transcription, some errors in transcription may have occurred.

## 2023-03-13 NOTE — LETTER
March 13, 2023       [address] YOB: 1955   [address]  [facility] Date of Visit:  3/13/2023       To Whom It May Concern: It is my medical opinion that Migdalia Cornelius may return to work on 3/20/2023 without restrictions. If you have any questions or concerns, please don't hesitate to call.     Sincerely,          TASIA Elizabeth

## 2023-03-17 ENCOUNTER — TELEPHONE (OUTPATIENT)
Dept: ORTHOPEDIC SURGERY | Age: 68
End: 2023-03-17

## 2023-03-17 NOTE — TELEPHONE ENCOUNTER
Pt called in stating that juice has her returning to work on 3/20/23 and that her knee is now swollen and she is barley able to walk.  Pt is wanting to know what she can do to help with the swelling

## 2023-03-17 NOTE — TELEPHONE ENCOUNTER
Are we able to extend he time off for her knee pain as she works at [employer] and her knee is swelling and very painful

## 2023-03-20 NOTE — TELEPHONE ENCOUNTER
Okay to provide extension for one more week. Also able to provide lifting restrictions of no more than 20 pounds if desired to return with restrictions instead.

## 2023-03-20 NOTE — TELEPHONE ENCOUNTER
I spoke with patient she stated she has taken vacation time this week through Friday to rest her knee. She did get the brace and has been wearing it. She continues with the diclofenac as directed, elevating her knee as well as icing it as needed. She stated at this time she will not need a note for her work.

## 2023-03-23 ENCOUNTER — TELEPHONE (OUTPATIENT)
Dept: ORTHOPEDIC SURGERY | Age: 68
End: 2023-03-23

## 2023-03-23 NOTE — TELEPHONE ENCOUNTER
Called pt and advised that Troy Parnell ok'ed her extended time off. She will stop into the [place] office to  a copy of her letter.

## 2023-03-23 NOTE — TELEPHONE ENCOUNTER
Tommy Peace, please see message below and advise. She was given a note allowing her to return without restrictions on 3/20/23. Are you ok with providing letter to extend time off until 3/27/23 as requested?

## 2023-03-23 NOTE — TELEPHONE ENCOUNTER
Patient called and would like a letter to return to work on Monday 03/27/2023 without restrictions? Please reach out to pt at [phone] so she would be able to pick it up, thank you    *pt was told to call today to let Tiara Kevan know how she was doing- pt still having some swelling on left knee and ankle, still having pain, she wanted to know should she be seen before next appt. * please advice and call pt, thank you

## 2023-03-23 NOTE — LETTER
March 23, 2023       Meme Mauricio YOB: 1955   [address]  [facility] Date of Visit:  3/23/2023       To Whom It May Concern: It is my medical opinion that Justino Mohamud may return to work on 3/27/2023 without restrictions. .    If you have any questions or concerns, please don't hesitate to call.     Sincerely,        TASIA Mcdonald

## 2023-03-28 ENCOUNTER — TELEPHONE (OUTPATIENT)
Dept: ORTHOPEDIC SURGERY | Age: 68
End: 2023-03-28

## 2023-03-28 NOTE — TELEPHONE ENCOUNTER
Called pt back and she stated that she is still taking Diclofenac but was advised that she can take tylenol additionally. She was also advised to ice her knee during her breaks. I also talked to her about coming into the office to try a different brace than one that she bought OTC to see if they would be more comfortable. She requested to stop into the [place] office so I told her that I will have to talk with the staff out there to see if they would be available to help her if she stopped over there tomorrow afternoon.

## 2023-03-28 NOTE — TELEPHONE ENCOUNTER
Pt called stating she started back to work yesterday. She has L knee pain and while wearing a brace with velco it left red marks on the side of her leg. She tried a sleeve brace with similar problems. She states the brace did help in terms of walking but it is still painful. She is on her feet for the better part of 8 hours and would like advice about other options.

## 2023-03-29 ENCOUNTER — OFFICE VISIT (OUTPATIENT)
Dept: ORTHOPEDIC SURGERY | Age: 68
End: 2023-03-29

## 2023-03-29 VITALS — WEIGHT: 160 LBS | BODY MASS INDEX: 25.11 KG/M2 | HEIGHT: 67 IN | RESPIRATION RATE: 14 BRPM

## 2023-03-29 DIAGNOSIS — R21 RASH, SKIN: Primary | ICD-10-CM

## 2023-03-29 DIAGNOSIS — M17.12 PRIMARY OSTEOARTHRITIS OF LEFT KNEE: ICD-10-CM

## 2023-03-29 NOTE — PROGRESS NOTES
[address] [facility] SPORTS MEDICINE  [address] [address] [place] [address] [place]  [address] [address]  Dept: [phone]  Dept Fax: [phone]        Ambulatory Follow Up      Subjective:   John Shukla is a 79y.o. year old female who presents to our office today for routine followup regarding her   1. Rash, skin    2. Primary osteoarthritis of left knee        Chief Complaint   Patient presents with    Pain     Lower leg swelling       HPI John Shukla  is a 79 y.o.  female who presents today in follow for Left knee pain and a new onset of skin rash x 3 days. The patient was last seen on 3/13/2023 by [facility] and underwent treatment in the form of prescription for diclofenac 75 mg twice daily, recommendations for over-the-counter knee brace and a note to return to work on 3/28/2023. The patient to our office today to be fitted for a economy knee brace and up on fitting our office staff noted that she had a diffuse rash on the anterior aspect of the left tibia. Patient requested evaluation therefore she is being seen today for the new onset rash. Notes that she has the rash approximately 3 days ago and denies any new lotions, soaps or skin irritants that could have caused the rash. She notes that it was initially itchy but is no longer itchy. She does have some discomfort along the proximal medial side of the knee. She notes that she has been utilizing IcyHot for the left lower extremity and knee, but was using it for long time prior to the rash occurring. She notes that there is not a rash on the other areas of her leg that she has been using the IcyHot cream.    She denies new medication or food that may have caused the issue. Review of Systems   Constitutional:  Negative for activity change and fever. HENT:  Negative for sneezing. Respiratory:  Negative for cough and shortness of breath.     Cardiovascular:

## 2023-03-29 NOTE — TELEPHONE ENCOUNTER
Spoke with pt and advised her that she can go to the [address] [place] [address] office this afternoon to look at the different brace options.

## 2023-03-30 ENCOUNTER — TELEPHONE (OUTPATIENT)
Dept: FAMILY MEDICINE CLINIC | Age: 68
End: 2023-03-30

## 2023-03-30 NOTE — TELEPHONE ENCOUNTER
Patient is calling to see about an appointment here before her next Ortho visit. Patient states that she was seen there yesterday (note in chart) and they were concerned that the problem with her left leg may be vascular in nature. Patient states that her left knee and lower leg is swollen and red. (See photo in Ortho note)Patient states that it is sensitive to touch, and difficult to walk because of it. Patient states that she is to start PT, do you want her to continue with it? Please advise do you want to see the patient or can you do a referral to vascular without the visit. Ortho aware of appointment on the 14th.

## 2023-03-31 ENCOUNTER — OFFICE VISIT (OUTPATIENT)
Dept: PRIMARY CARE CLINIC | Age: 68
End: 2023-03-31
Payer: COMMERCIAL

## 2023-03-31 VITALS
HEART RATE: 72 BPM | TEMPERATURE: 98.7 F | SYSTOLIC BLOOD PRESSURE: 126 MMHG | OXYGEN SATURATION: 97 % | DIASTOLIC BLOOD PRESSURE: 76 MMHG

## 2023-03-31 DIAGNOSIS — M79.662 PAIN OF LEFT LOWER LEG: Primary | ICD-10-CM

## 2023-03-31 DIAGNOSIS — R21 RASH: ICD-10-CM

## 2023-03-31 PROCEDURE — 1123F ACP DISCUSS/DSCN MKR DOCD: CPT | Performed by: PHYSICIAN ASSISTANT

## 2023-03-31 PROCEDURE — 99213 OFFICE O/P EST LOW 20 MIN: CPT | Performed by: PHYSICIAN ASSISTANT

## 2023-03-31 RX ORDER — METHYLPREDNISOLONE 4 MG/1
TABLET ORAL
Qty: 1 KIT | Refills: 0 | Status: SHIPPED | OUTPATIENT
Start: 2023-03-31

## 2023-03-31 ASSESSMENT — ENCOUNTER SYMPTOMS
COUGH: 0
SHORTNESS OF BREATH: 0
VOMITING: 0
COLOR CHANGE: 0

## 2023-03-31 NOTE — TELEPHONE ENCOUNTER
Maybe offer her walk-in for evaluation? I did speak to Ortho via phone and she did not describe the tenderness Dodouglas Ayala is reporting.

## 2023-03-31 NOTE — PROGRESS NOTES
light. Cardiovascular:      Rate and Rhythm: Normal rate. Pulmonary:      Effort: Pulmonary effort is normal.   Abdominal:      Palpations: Abdomen is soft. Skin:     General: Skin is warm and dry. Findings: Rash present. Neurological:      Mental Status: She is alert and oriented to person, place, and time. DIFFERENTIAL DIAGNOSIS:   Dermatitis vas vasculitis     Felipe reviewed the disposition diagnosis with the patient and or their family/guardian. I have answered their questions and given discharge instructions. They voiced understanding of these instructions and did not have anyfurther questions or complaints. PROCEDURES:  Orders Placed This Encounter   Procedures    PHIL Purcell MD, Vascular Surgery, [place]     Referral Priority:   Routine     Referral Type:   Eval and Treat     Referral Reason:   Specialty Services Required     Referred to Provider:   Danni Kennedy MD     Requested Specialty:   Vascular Surgery     Number of Visits Requested:   1       No results found for this visit on 03/31/23. FINALIMPRESSION      Visit Diagnoses and Associated Orders       Pain of left lower leg    -  Primary    PHIL Purcell MD, Vascular Surgery, [facility] [[id number] Custom]           Rash        PHIL Purcell MD, Vascular Surgery, [facility] [[id number] Custom]           ORDERS WITHOUT AN ASSOCIATED DIAGNOSIS    methylPREDNISolone (MEDROL DOSEPACK) 4 MG tablet [4991]                PLAN     Return if symptoms worsen or fail to improve. DISCHARGEMEDICATIONS:  Orders Placed This Encounter   Medications    methylPREDNISolone (MEDROL DOSEPACK) 4 MG tablet     Sig: Take by mouth. Dispense:  1 kit     Refill:  0         Plan:  1. Keep the rash clean and dry  2. Apply all medication as prescribed  3. Take all medication as prescribed  4. Patient educated on signs/ symptoms of infection  5. Patient to follow up with PCP if rash persists or gets worse.    6. Patient to follow up with Vascular

## 2023-04-04 ASSESSMENT — ENCOUNTER SYMPTOMS
GASTROINTESTINAL NEGATIVE: 1
RESPIRATORY NEGATIVE: 1

## 2023-04-13 ENCOUNTER — OFFICE VISIT (OUTPATIENT)
Dept: ORTHOPEDIC SURGERY | Age: 68
End: 2023-04-13
Payer: COMMERCIAL

## 2023-04-13 VITALS — WEIGHT: 160 LBS | RESPIRATION RATE: 14 BRPM | HEIGHT: 67 IN | BODY MASS INDEX: 25.11 KG/M2

## 2023-04-13 DIAGNOSIS — S83.242D TEAR OF MEDIAL MENISCUS OF LEFT KNEE, CURRENT, UNSPECIFIED TEAR TYPE, SUBSEQUENT ENCOUNTER: Primary | ICD-10-CM

## 2023-04-13 PROCEDURE — 99213 OFFICE O/P EST LOW 20 MIN: CPT | Performed by: PHYSICIAN ASSISTANT

## 2023-04-13 PROCEDURE — 1123F ACP DISCUSS/DSCN MKR DOCD: CPT | Performed by: PHYSICIAN ASSISTANT

## 2023-04-14 ENCOUNTER — OFFICE VISIT (OUTPATIENT)
Dept: FAMILY MEDICINE CLINIC | Age: 68
End: 2023-04-14
Payer: COMMERCIAL

## 2023-04-14 VITALS
DIASTOLIC BLOOD PRESSURE: 72 MMHG | OXYGEN SATURATION: 97 % | TEMPERATURE: 98.5 F | HEIGHT: 67 IN | BODY MASS INDEX: 24.48 KG/M2 | WEIGHT: 156 LBS | SYSTOLIC BLOOD PRESSURE: 130 MMHG | HEART RATE: 94 BPM

## 2023-04-14 DIAGNOSIS — N39.3 STRESS INCONTINENCE: ICD-10-CM

## 2023-04-14 DIAGNOSIS — R73.03 PRE-DIABETES: ICD-10-CM

## 2023-04-14 DIAGNOSIS — G89.29 CHRONIC PAIN OF LEFT KNEE: Primary | ICD-10-CM

## 2023-04-14 DIAGNOSIS — E78.5 HYPERLIPIDEMIA, UNSPECIFIED HYPERLIPIDEMIA TYPE: ICD-10-CM

## 2023-04-14 DIAGNOSIS — M25.562 CHRONIC PAIN OF LEFT KNEE: Primary | ICD-10-CM

## 2023-04-14 PROCEDURE — 99213 OFFICE O/P EST LOW 20 MIN: CPT | Performed by: NURSE PRACTITIONER

## 2023-04-14 PROCEDURE — 1123F ACP DISCUSS/DSCN MKR DOCD: CPT | Performed by: NURSE PRACTITIONER

## 2023-04-14 RX ORDER — ATORVASTATIN CALCIUM 20 MG/1
20 TABLET, FILM COATED ORAL DAILY
Qty: 90 TABLET | Refills: 1 | Status: SHIPPED | OUTPATIENT
Start: 2023-04-14 | End: 2024-04-13

## 2023-04-14 SDOH — ECONOMIC STABILITY: FOOD INSECURITY: WITHIN THE PAST 12 MONTHS, YOU WORRIED THAT YOUR FOOD WOULD RUN OUT BEFORE YOU GOT MONEY TO BUY MORE.: NEVER TRUE

## 2023-04-14 SDOH — ECONOMIC STABILITY: FOOD INSECURITY: WITHIN THE PAST 12 MONTHS, THE FOOD YOU BOUGHT JUST DIDN'T LAST AND YOU DIDN'T HAVE MONEY TO GET MORE.: NEVER TRUE

## 2023-04-14 SDOH — ECONOMIC STABILITY: HOUSING INSECURITY
IN THE LAST 12 MONTHS, WAS THERE A TIME WHEN YOU DID NOT HAVE A STEADY PLACE TO SLEEP OR SLEPT IN A SHELTER (INCLUDING NOW)?: NO

## 2023-04-14 SDOH — ECONOMIC STABILITY: INCOME INSECURITY: HOW HARD IS IT FOR YOU TO PAY FOR THE VERY BASICS LIKE FOOD, HOUSING, MEDICAL CARE, AND HEATING?: NOT HARD AT ALL

## 2023-04-14 ASSESSMENT — ENCOUNTER SYMPTOMS
COUGH: 0
NAUSEA: 0
CONSTIPATION: 0
WHEEZING: 0
COLOR CHANGE: 0
APNEA: 0
VOMITING: 0
SORE THROAT: 0
ABDOMINAL PAIN: 0
RHINORRHEA: 0
SHORTNESS OF BREATH: 0
DIARRHEA: 0
ABDOMINAL DISTENTION: 0
CHEST TIGHTNESS: 0

## 2023-04-14 ASSESSMENT — PATIENT HEALTH QUESTIONNAIRE - PHQ9
SUM OF ALL RESPONSES TO PHQ9 QUESTIONS 1 & 2: 0
SUM OF ALL RESPONSES TO PHQ QUESTIONS 1-9: 0
1. LITTLE INTEREST OR PLEASURE IN DOING THINGS: 0
2. FEELING DOWN, DEPRESSED OR HOPELESS: 0
SUM OF ALL RESPONSES TO PHQ QUESTIONS 1-9: 0

## 2023-04-14 ASSESSMENT — ANXIETY QUESTIONNAIRES
1. FEELING NERVOUS, ANXIOUS, OR ON EDGE: 0
7. FEELING AFRAID AS IF SOMETHING AWFUL MIGHT HAPPEN: 0
4. TROUBLE RELAXING: 0
3. WORRYING TOO MUCH ABOUT DIFFERENT THINGS: 0
6. BECOMING EASILY ANNOYED OR IRRITABLE: 0
GAD7 TOTAL SCORE: 0
2. NOT BEING ABLE TO STOP OR CONTROL WORRYING: 0
5. BEING SO RESTLESS THAT IT IS HARD TO SIT STILL: 0
IF YOU CHECKED OFF ANY PROBLEMS ON THIS QUESTIONNAIRE, HOW DIFFICULT HAVE THESE PROBLEMS MADE IT FOR YOU TO DO YOUR WORK, TAKE CARE OF THINGS AT HOME, OR GET ALONG WITH OTHER PEOPLE: NOT DIFFICULT AT ALL

## 2023-04-25 ENCOUNTER — HOSPITAL ENCOUNTER (OUTPATIENT)
Dept: MRI IMAGING | Facility: CLINIC | Age: 68
Discharge: HOME | End: 2023-04-27
Payer: COMMERCIAL

## 2023-04-25 DIAGNOSIS — S83.242D TEAR OF MEDIAL MENISCUS OF LEFT KNEE, CURRENT, UNSPECIFIED TEAR TYPE, SUBSEQUENT ENCOUNTER: ICD-10-CM

## 2023-04-25 PROCEDURE — 73721 MRI JNT OF LWR EXTRE W/O DYE: CPT

## 2023-04-26 ENCOUNTER — TELEPHONE (OUTPATIENT)
Dept: ORTHOPEDIC SURGERY | Age: 68
End: 2023-04-26

## 2023-04-28 ENCOUNTER — TELEPHONE (OUTPATIENT)
Dept: ORTHOPEDIC SURGERY | Age: 68
End: 2023-04-28

## 2023-04-28 NOTE — TELEPHONE ENCOUNTER
----- Message from [place], [place] sent at 4/27/2023  4:15 PM EDT -----  Tearing of the medial and lateral meniscus, grade 1 sprain of the MCL. Questionable subchondral fracture.   Recommend follow with Dr. Vanessa Yu to discuss MRI results and possible arthroscopic intervention

## 2023-05-01 ENCOUNTER — OFFICE VISIT (OUTPATIENT)
Dept: ORTHOPEDIC SURGERY | Age: 68
End: 2023-05-01
Payer: COMMERCIAL

## 2023-05-01 DIAGNOSIS — S83.242D TEAR OF MEDIAL MENISCUS OF LEFT KNEE, CURRENT, UNSPECIFIED TEAR TYPE, SUBSEQUENT ENCOUNTER: Primary | ICD-10-CM

## 2023-05-01 PROCEDURE — 1123F ACP DISCUSS/DSCN MKR DOCD: CPT | Performed by: ORTHOPAEDIC SURGERY

## 2023-05-01 PROCEDURE — 99213 OFFICE O/P EST LOW 20 MIN: CPT | Performed by: ORTHOPAEDIC SURGERY

## 2023-05-01 NOTE — PROGRESS NOTES
Ck Mcnair M.D.            [address] [facility] [address] [facility],[address], [address] [facility]           Dept Phone: [phone]           Dept Fax:  [address], [place]          Dept Phone: [phone]           Dept Fax:  [phone]      Chief Compliant:  Chief Complaint   Patient presents with    Pain     Lt knee        History of Present Illness: This is a 79 y.o. female who presents to the clinic today for evaluation / follow up of acute left knee pain. 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 She has seen Nicolette Bumps in the past and noted to have increasing catching and stabbing pain of her left knee. She states that she has a known history of some early arthritis of her knee but this got acutely worse and with an episode outside of work. She did have a recent MRI as indicated below. Review of Systems   Constitutional: Negative for fever, chills, sweats. Eyes: Negative for changes in vision, or pain. HENT: Negative for ear ache, epistaxis, or sore throat. Respiratory/Cardio: Negative for Chest pain, palpitations, SOB, or cough. Gastrointestinal: Negative for abdominal pain, N/V/D. Genitourinary: Negative for dysuria, frequency, urgency, or hematuria. Neurological: Negative for headache, numbness, or weakness. Integumentary: Negative for rash, itching, laceration, or abrasion. Musculoskeletal: Positive for Pain (Lt knee)       Physical Exam:  Constitutional: Patient is oriented to person, place, and time. Patient appears well-developed and well nourished. HENT: Negative otherwise noted  Head: Normocephalic and Atraumatic  Nose: Normal  Eyes: Conjunctivae and EOM are normal  Neck: Normal range of motion Neck supple.     Respiratory/Cardio: Effort normal. No respiratory

## 2023-05-17 ENCOUNTER — HOSPITAL ENCOUNTER (OUTPATIENT)
Dept: PREADMISSION TESTING | Age: 68
Discharge: HOME | End: 2023-05-17
Payer: COMMERCIAL

## 2023-05-17 VITALS
DIASTOLIC BLOOD PRESSURE: 71 MMHG | HEIGHT: 67 IN | HEART RATE: 73 BPM | SYSTOLIC BLOOD PRESSURE: 122 MMHG | BODY MASS INDEX: 24.48 KG/M2 | OXYGEN SATURATION: 98 % | WEIGHT: 156 LBS | RESPIRATION RATE: 18 BRPM | TEMPERATURE: 97.3 F

## 2023-05-17 LAB
ANION GAP SERPL CALCULATED.3IONS-SCNC: 7 MMOL/L (ref 9–17)
BASOPHILS # BLD: 0.1 K/UL (ref 0–0.2)
BASOPHILS NFR BLD: 1 % (ref 0–2)
BUN SERPL-MCNC: 14 MG/DL (ref 8–23)
CALCIUM SERPL-MCNC: 9.3 MG/DL (ref 8.6–10.4)
CHLORIDE SERPL-SCNC: 105 MMOL/L (ref 98–107)
CO2 SERPL-SCNC: 31 MMOL/L (ref 20–31)
CREAT SERPL-MCNC: 0.66 MG/DL (ref 0.5–0.9)
EOSINOPHIL # BLD: 0.1 K/UL (ref 0–0.4)
EOSINOPHILS RELATIVE PERCENT: 1 % (ref 0–4)
ERYTHROCYTE [DISTWIDTH] IN BLOOD BY AUTOMATED COUNT: 13.7 % (ref 11.5–14.9)
GFR SERPL CREATININE-BSD FRML MDRD: >60 ML/MIN/1.73M2
GLUCOSE SERPL-MCNC: 91 MG/DL (ref 70–99)
HCT VFR BLD AUTO: 38.1 % (ref 36–46)
HGB BLD-MCNC: 12.7 G/DL (ref 12–16)
LYMPHOCYTES # BLD: 35 % (ref 24–44)
LYMPHOCYTES NFR BLD: 2.3 K/UL (ref 1–4.8)
MCH RBC QN AUTO: 28.9 PG (ref 26–34)
MCHC RBC AUTO-ENTMCNC: 33.4 G/DL (ref 31–37)
MCV RBC AUTO: 86.7 FL (ref 80–100)
MONOCYTES NFR BLD: 0.6 K/UL (ref 0.1–1.3)
MONOCYTES NFR BLD: 10 % (ref 1–7)
NEUTROPHILS NFR BLD: 53 % (ref 36–66)
NEUTS SEG NFR BLD: 3.5 K/UL (ref 1.3–9.1)
PLATELET # BLD AUTO: 164 K/UL (ref 150–450)
PMV BLD AUTO: 10.1 FL (ref 6–12)
POTASSIUM SERPL-SCNC: 3.8 MMOL/L (ref 3.7–5.3)
RBC # BLD AUTO: 4.39 M/UL (ref 4–5.2)
SODIUM SERPL-SCNC: 143 MMOL/L (ref 135–144)
WBC OTHER # BLD: 6.6 K/UL (ref 3.5–11)

## 2023-05-17 PROCEDURE — 85025 COMPLETE CBC W/AUTO DIFF WBC: CPT

## 2023-05-17 PROCEDURE — 80048 BASIC METABOLIC PNL TOTAL CA: CPT

## 2023-05-17 NOTE — DISCHARGE INSTRUCTIONS
Pre-op Instructions For Out-Patient Surgery    Medication Instructions:  Please stop herbs and any supplements now (includes vitamins and minerals). Please contact your surgeon and prescribing physician for pre-op instructions for any blood thinners. If you have inhalers/aerosol treatments at home, please use them the morning of your surgery and bring the inhalers with you to the hospital.    Please take the following medications the morning of your surgery with a sip of water:    omeprazole    Surgery Instructions:  After midnight before surgery:  Do not eat or drink anything, including water, mints, gum, and hard candy. You may brush your teeth without swallowing. No smoking, chewing tobacco, or street drugs. Please shower or bathe before surgery. If you were given Surgical Scrub Chlorhexidine Gluconate Liquid (CHG), please shower the night before and the morning of your surgery following the detailed instructions you received during your pre-admission visit. Please do not wear any cologne, lotion, powder, deodorant, jewelry, piercings, perfume, makeup, nail polish, hair accessories, or hair spray on the day of surgery. Wear loose comfortable clothing. Leave your valuables at home. Bring a storage case for any glasses/contacts. An adult who is responsible for you MUST drive you home and should be with you for the first 24 hours after surgery. If having out-patient knee and foot surgeries, please arrange for planned crutches, walker, or wheelchair before arriving to the hospital.    The Day of Surgery:  Arrive at [facility] Entr[facility] at the time directed by your surgeon and check in at the desk. If you have a living will or healthcare power of , please bring a copy. You will be taken to the pre-op holding area where you will be prepared for surgery.   A physical assessment will be performed by a nurse practitioner or house

## 2023-05-25 ENCOUNTER — TELEPHONE (OUTPATIENT)
Dept: ORTHOPEDIC SURGERY | Facility: CLINIC | Age: 68
End: 2023-05-25

## 2023-05-31 ENCOUNTER — ANESTHESIA EVENT (OUTPATIENT)
Dept: OPERATING ROOM | Age: 68
End: 2023-05-31
Payer: COMMERCIAL

## 2023-05-31 NOTE — PRE-PROCEDURE INSTRUCTIONS
No answer, left message ? Unable to leave message ? When were you told to arrive at hospital ?  0830    Do you have a  ? yes    Are you on any blood thinners ? no              If yes when did you stop taking ? Do you have your prep Rx filled and instruction ? Nothing to eat the day before , only clear liquids. Are you experiencing any covid symptoms ?   no  Do you have any infections or rash we should be aware of ?  none    Do you have the Hibiclens soap to use the night before and the morning of surgery ? yes    Nothing to eat or drink after midnight, only a sip of water to take any medication instructed to take the night before. Wear comfortable clothing, leave any valuables at home, remove any jewelry and body piercing .     Notified

## 2023-06-01 ENCOUNTER — HOSPITAL ENCOUNTER (OUTPATIENT)
Age: 68
Setting detail: OUTPATIENT SURGERY
Discharge: HOME | End: 2023-06-01
Attending: ORTHOPAEDIC SURGERY | Admitting: STUDENT IN AN ORGANIZED HEALTH CARE EDUCATION/TRAINING PROGRAM
Payer: COMMERCIAL

## 2023-06-01 ENCOUNTER — ANESTHESIA (OUTPATIENT)
Dept: OPERATING ROOM | Age: 68
End: 2023-06-01
Payer: COMMERCIAL

## 2023-06-01 VITALS
HEIGHT: 67 IN | BODY MASS INDEX: 24.48 KG/M2 | DIASTOLIC BLOOD PRESSURE: 72 MMHG | OXYGEN SATURATION: 99 % | HEART RATE: 62 BPM | RESPIRATION RATE: 16 BRPM | SYSTOLIC BLOOD PRESSURE: 136 MMHG | TEMPERATURE: 96.9 F | WEIGHT: 156 LBS

## 2023-06-01 DIAGNOSIS — S83.282A ACUTE LATERAL MENISCUS TEAR OF LEFT KNEE, INITIAL ENCOUNTER: Primary | ICD-10-CM

## 2023-06-01 PROCEDURE — 7100000011 HC PHASE II RECOVERY - ADDTL 15 MIN: Performed by: ORTHOPAEDIC SURGERY

## 2023-06-01 PROCEDURE — 29880 ARTHRS KNE SRG MNISECTMY M&L: CPT | Performed by: ORTHOPAEDIC SURGERY

## 2023-06-01 PROCEDURE — 6370000000 HC RX 637 (ALT 250 FOR IP): Performed by: ANESTHESIOLOGY

## 2023-06-01 PROCEDURE — 2500000003 HC RX 250 WO HCPCS: Performed by: NURSE ANESTHETIST, CERTIFIED REGISTERED

## 2023-06-01 PROCEDURE — 6360000002 HC RX W HCPCS: Performed by: ORTHOPAEDIC SURGERY

## 2023-06-01 PROCEDURE — 6360000002 HC RX W HCPCS: Performed by: NURSE ANESTHETIST, CERTIFIED REGISTERED

## 2023-06-01 PROCEDURE — 7100000001 HC PACU RECOVERY - ADDTL 15 MIN: Performed by: ORTHOPAEDIC SURGERY

## 2023-06-01 PROCEDURE — 7100000031 HC ASPR PHASE II RECOVERY - ADDTL 15 MIN: Performed by: ORTHOPAEDIC SURGERY

## 2023-06-01 PROCEDURE — 2709999900 HC NON-CHARGEABLE SUPPLY: Performed by: ORTHOPAEDIC SURGERY

## 2023-06-01 PROCEDURE — 2580000003 HC RX 258: Performed by: ANESTHESIOLOGY

## 2023-06-01 PROCEDURE — 7100000030 HC ASPR PHASE II RECOVERY - FIRST 15 MIN: Performed by: ORTHOPAEDIC SURGERY

## 2023-06-01 PROCEDURE — 7100000010 HC PHASE II RECOVERY - FIRST 15 MIN: Performed by: ORTHOPAEDIC SURGERY

## 2023-06-01 PROCEDURE — 3600000013 HC SURGERY LEVEL 3 ADDTL 15MIN: Performed by: ORTHOPAEDIC SURGERY

## 2023-06-01 PROCEDURE — 3700000000 HC ANESTHESIA ATTENDED CARE: Performed by: ORTHOPAEDIC SURGERY

## 2023-06-01 PROCEDURE — 3700000001 HC ADD 15 MINUTES (ANESTHESIA): Performed by: ORTHOPAEDIC SURGERY

## 2023-06-01 PROCEDURE — 2500000003 HC RX 250 WO HCPCS: Performed by: ANESTHESIOLOGY

## 2023-06-01 PROCEDURE — 3600000003 HC SURGERY LEVEL 3 BASE: Performed by: ORTHOPAEDIC SURGERY

## 2023-06-01 PROCEDURE — 7100000000 HC PACU RECOVERY - FIRST 15 MIN: Performed by: ORTHOPAEDIC SURGERY

## 2023-06-01 RX ORDER — ROPIVACAINE HYDROCHLORIDE 5 MG/ML
INJECTION, SOLUTION EPIDURAL; INFILTRATION; PERINEURAL PRN
Status: DISCONTINUED | OUTPATIENT
Start: 2023-06-01 | End: 2023-06-01 | Stop reason: ALTCHOICE

## 2023-06-01 RX ORDER — FENTANYL CITRATE 50 UG/ML
INJECTION, SOLUTION INTRAMUSCULAR; INTRAVENOUS PRN
Status: DISCONTINUED | OUTPATIENT
Start: 2023-06-01 | End: 2023-06-01 | Stop reason: SDUPTHER

## 2023-06-01 RX ORDER — GABAPENTIN 100 MG/1
100 CAPSULE ORAL ONCE
Status: COMPLETED | OUTPATIENT
Start: 2023-06-01 | End: 2023-06-01

## 2023-06-01 RX ORDER — METOCLOPRAMIDE HYDROCHLORIDE 5 MG/ML
10 INJECTION INTRAMUSCULAR; INTRAVENOUS
Status: DISCONTINUED | OUTPATIENT
Start: 2023-06-01 | End: 2023-06-01 | Stop reason: HOSPADM

## 2023-06-01 RX ORDER — HYDROCODONE BITARTRATE AND ACETAMINOPHEN 5; 325 MG/1; MG/1
1 TABLET ORAL EVERY 6 HOURS PRN
Status: DISCONTINUED | OUTPATIENT
Start: 2023-06-01 | End: 2023-06-01 | Stop reason: HOSPADM

## 2023-06-01 RX ORDER — FENTANYL CITRATE 0.05 MG/ML
25 INJECTION, SOLUTION INTRAMUSCULAR; INTRAVENOUS EVERY 5 MIN PRN
Status: DISCONTINUED | OUTPATIENT
Start: 2023-06-01 | End: 2023-06-01 | Stop reason: HOSPADM

## 2023-06-01 RX ORDER — HYDRALAZINE HYDROCHLORIDE 20 MG/ML
10 INJECTION INTRAMUSCULAR; INTRAVENOUS
Status: DISCONTINUED | OUTPATIENT
Start: 2023-06-01 | End: 2023-06-01 | Stop reason: HOSPADM

## 2023-06-01 RX ORDER — PROPOFOL 10 MG/ML
INJECTION, EMULSION INTRAVENOUS PRN
Status: DISCONTINUED | OUTPATIENT
Start: 2023-06-01 | End: 2023-06-01 | Stop reason: SDUPTHER

## 2023-06-01 RX ORDER — ONDANSETRON 2 MG/ML
4 INJECTION INTRAMUSCULAR; INTRAVENOUS
Status: DISCONTINUED | OUTPATIENT
Start: 2023-06-01 | End: 2023-06-01 | Stop reason: HOSPADM

## 2023-06-01 RX ORDER — LIDOCAINE HYDROCHLORIDE 10 MG/ML
1 INJECTION, SOLUTION EPIDURAL; INFILTRATION; INTRACAUDAL; PERINEURAL
Status: COMPLETED | OUTPATIENT
Start: 2023-06-01 | End: 2023-06-01

## 2023-06-01 RX ORDER — DIPHENHYDRAMINE HYDROCHLORIDE 50 MG/ML
12.5 INJECTION INTRAMUSCULAR; INTRAVENOUS
Status: DISCONTINUED | OUTPATIENT
Start: 2023-06-01 | End: 2023-06-01 | Stop reason: HOSPADM

## 2023-06-01 RX ORDER — SODIUM CHLORIDE 0.9 % (FLUSH) 0.9 %
5-40 SYRINGE (ML) INJECTION PRN
Status: DISCONTINUED | OUTPATIENT
Start: 2023-06-01 | End: 2023-06-01 | Stop reason: HOSPADM

## 2023-06-01 RX ORDER — SODIUM CHLORIDE 0.9 % (FLUSH) 0.9 %
5-40 SYRINGE (ML) INJECTION EVERY 12 HOURS SCHEDULED
Status: DISCONTINUED | OUTPATIENT
Start: 2023-06-01 | End: 2023-06-01 | Stop reason: HOSPADM

## 2023-06-01 RX ORDER — LIDOCAINE HYDROCHLORIDE 10 MG/ML
INJECTION, SOLUTION EPIDURAL; INFILTRATION; INTRACAUDAL; PERINEURAL PRN
Status: DISCONTINUED | OUTPATIENT
Start: 2023-06-01 | End: 2023-06-01 | Stop reason: SDUPTHER

## 2023-06-01 RX ORDER — SODIUM CHLORIDE, SODIUM LACTATE, POTASSIUM CHLORIDE, CALCIUM CHLORIDE 600; 310; 30; 20 MG/100ML; MG/100ML; MG/100ML; MG/100ML
INJECTION, SOLUTION INTRAVENOUS CONTINUOUS
Status: DISCONTINUED | OUTPATIENT
Start: 2023-06-01 | End: 2023-06-01 | Stop reason: HOSPADM

## 2023-06-01 RX ORDER — LABETALOL HYDROCHLORIDE 5 MG/ML
10 INJECTION, SOLUTION INTRAVENOUS
Status: DISCONTINUED | OUTPATIENT
Start: 2023-06-01 | End: 2023-06-01 | Stop reason: HOSPADM

## 2023-06-01 RX ORDER — ACETAMINOPHEN 500 MG
1000 TABLET ORAL ONCE
Status: COMPLETED | OUTPATIENT
Start: 2023-06-01 | End: 2023-06-01

## 2023-06-01 RX ORDER — ONDANSETRON 2 MG/ML
INJECTION INTRAMUSCULAR; INTRAVENOUS PRN
Status: DISCONTINUED | OUTPATIENT
Start: 2023-06-01 | End: 2023-06-01 | Stop reason: SDUPTHER

## 2023-06-01 RX ORDER — MIDAZOLAM HYDROCHLORIDE 1 MG/ML
INJECTION INTRAMUSCULAR; INTRAVENOUS PRN
Status: DISCONTINUED | OUTPATIENT
Start: 2023-06-01 | End: 2023-06-01 | Stop reason: SDUPTHER

## 2023-06-01 RX ORDER — DEXAMETHASONE SODIUM PHOSPHATE 4 MG/ML
INJECTION, SOLUTION INTRA-ARTICULAR; INTRALESIONAL; INTRAMUSCULAR; INTRAVENOUS; SOFT TISSUE PRN
Status: DISCONTINUED | OUTPATIENT
Start: 2023-06-01 | End: 2023-06-01 | Stop reason: SDUPTHER

## 2023-06-01 RX ORDER — SODIUM CHLORIDE 9 MG/ML
25 INJECTION, SOLUTION INTRAVENOUS PRN
Status: DISCONTINUED | OUTPATIENT
Start: 2023-06-01 | End: 2023-06-01 | Stop reason: HOSPADM

## 2023-06-01 RX ORDER — HYDROCODONE BITARTRATE AND ACETAMINOPHEN 5; 325 MG/1; MG/1
1 TABLET ORAL EVERY 6 HOURS PRN
Qty: 20 TABLET | Refills: 0 | Status: SHIPPED | OUTPATIENT
Start: 2023-06-01 | End: 2023-06-06

## 2023-06-01 RX ORDER — SODIUM CHLORIDE 9 MG/ML
INJECTION, SOLUTION INTRAVENOUS PRN
Status: DISCONTINUED | OUTPATIENT
Start: 2023-06-01 | End: 2023-06-01 | Stop reason: HOSPADM

## 2023-06-01 RX ADMIN — MIDAZOLAM 2 MG: 1 INJECTION INTRAMUSCULAR; INTRAVENOUS at 10:33

## 2023-06-01 RX ADMIN — GABAPENTIN 100 MG: 100 CAPSULE ORAL at 09:58

## 2023-06-01 RX ADMIN — SODIUM CHLORIDE, POTASSIUM CHLORIDE, SODIUM LACTATE AND CALCIUM CHLORIDE: 600; 310; 30; 20 INJECTION, SOLUTION INTRAVENOUS at 09:56

## 2023-06-01 RX ADMIN — LIDOCAINE HYDROCHLORIDE 40 MG: 10 INJECTION, SOLUTION EPIDURAL; INFILTRATION; INTRACAUDAL; PERINEURAL at 10:41

## 2023-06-01 RX ADMIN — LIDOCAINE HYDROCHLORIDE 1 ML: 10 INJECTION, SOLUTION EPIDURAL; INFILTRATION; INTRACAUDAL; PERINEURAL at 09:56

## 2023-06-01 RX ADMIN — FENTANYL CITRATE 50 MCG: 50 INJECTION, SOLUTION INTRAMUSCULAR; INTRAVENOUS at 11:00

## 2023-06-01 RX ADMIN — ONDANSETRON 4 MG: 2 INJECTION INTRAMUSCULAR; INTRAVENOUS at 10:52

## 2023-06-01 RX ADMIN — PROPOFOL 50 MG: 10 INJECTION, EMULSION INTRAVENOUS at 10:43

## 2023-06-01 RX ADMIN — ACETAMINOPHEN 1000 MG: 500 TABLET ORAL at 09:58

## 2023-06-01 RX ADMIN — FENTANYL CITRATE 25 MCG: 50 INJECTION, SOLUTION INTRAMUSCULAR; INTRAVENOUS at 11:18

## 2023-06-01 RX ADMIN — PROPOFOL 150 MG: 10 INJECTION, EMULSION INTRAVENOUS at 10:41

## 2023-06-01 RX ADMIN — DEXAMETHASONE SODIUM PHOSPHATE 4 MG: 4 INJECTION, SOLUTION INTRAMUSCULAR; INTRAVENOUS at 10:52

## 2023-06-01 ASSESSMENT — ENCOUNTER SYMPTOMS
DIARRHEA: 1
EYES NEGATIVE: 1
RESPIRATORY NEGATIVE: 1

## 2023-06-01 ASSESSMENT — PAIN - FUNCTIONAL ASSESSMENT: PAIN_FUNCTIONAL_ASSESSMENT: 0-10

## 2023-06-01 ASSESSMENT — PAIN SCALES - GENERAL
PAINLEVEL_OUTOF10: 0
PAINLEVEL_OUTOF10: 0

## 2023-06-01 NOTE — OP NOTE
Operative Note      Patient: Isamar Stover  YOB: 1955  MRN: [id number]    Date of Procedure: 6/1/2023    Pre-Op Diagnosis Codes:     * Complex tear of medial meniscus of left knee, unspecified whether old or current tear, subsequent encounter [S83.232D]    Post-Op Diagnosis: Same plus anterior horn lateral meniscus tear left knee       Procedure(s):  KNEE ARTHROSCOPY PARTIAL MEDIAL MENISCECTOMY AND PARTIAL LATERAL MENISCECTOMY    Surgeon(s):  Romero Pleitez MD    Assistant:   Resident: Jaspreet Bell MD    Anesthesia: General    Estimated Blood Loss (mL): Minimal    Complications: None    Specimens:   * No specimens in log *    Implants:  * No implants in log *      Drains: * No LDAs found *    Findings: Tear of the posterior horn medial meniscus that needed to the root ligament. Also anterior horn lateral meniscus tear and grade 3/grade 4 chondral lesion patellofemoral joint      Detailed Description of Procedure:     Patient is a 76y.o. year old female who presents with a history of pain, locking, and giving away sensations of their left knee. Physical examination was positive for Nadeem's examination. MRI was consistent with a tear of the posterior horn of the medial meniscus extending into the root ligament as well as a anterior horn lateral meniscus tear. . Having failed conservative treatment, it was advised that arthroscopic examination and treatment of their knee would be beneficial and consent was obtained with risk and benefits explained to the patient. The patient was taken tot he operative room and general anesthesia was administered. A tourniquet was placed to the operatives lower extremity and then placed in the leg españa. The leg was exsanguinated and the tourniquet inflated to the 300mmHg. The leg was the prepped and draped in the usual sterile fashion. Time-out was called to verify laterality.      Medial and lateral portals were made and the scope placed in the lateral portal.

## 2023-06-01 NOTE — ANESTHESIA POSTPROCEDURE EVALUATION
Department of Anesthesiology  Postprocedure Note    Patient: Frankey Large  MRN: [id number]  [place]: 1955  Date of evaluation: 6/1/2023      Procedure Summary     Date: 06/01/23 Room / Location: [address] [place] [address]: SAKSHI ENCINAS    Anesthesia Start: 8561 Anesthesia Stop: 36    Procedure: KNEE ARTHROSCOPY PARTIAL MEDIAL MENISCECTOMY AND PARTIAL LATERAL MENISCECTOMY (Left: Knee) Diagnosis:       Complex tear of medial meniscus of left knee, unspecified whether old or current tear, subsequent encounter      (Complex tear of medial meniscus of left knee, unspecified whether old or current tear, subsequent encounter [S83.232D])    Surgeons: sAa García MD Responsible Provider: Mariah Koyanagi, MD    Anesthesia Type: general ASA Status: 2          Anesthesia Type: No value filed.     David Phase I: David Score: 10    David Phase II:        Anesthesia Post Evaluation    Comments: POST- ANESTHESIA EVALUATION       Pt Name: Frankey Large  MRN: [id number]  [place]: 1955  Date of evaluation: 6/1/2023  Time:  1:16 PM      /74   Pulse 62   Temp 96.9 °F (36.1 °C) (Infrared)   Resp 13   Ht 5' 7\" (1.702 m)   Wt 156 lb (70.8 kg)   SpO2 97%   BMI 24.43 kg/m²      Consciousness Level  Awake  Cardiopulmonary Status  Stable  Pain Adequately Treated YES  Nausea / Vomiting  NO  Adequate Hydration  YES  Anesthesia Related Complications NONE      Electronically signed by Mariah Koyanagi, MD on 6/1/2023 at 1:16 PM

## 2023-06-01 NOTE — ANESTHESIA PRE PROCEDURE
Department of Anesthesiology  Preprocedure Note       Name:  Janey Lundy   Age:  76 y.o.  :  1955                                          MRN:  [id number]         Date:  2023      Surgeon: Nata Benton):  Frank Acuna MD    Procedure: Procedure(s):  KNEE ARTHROSCOPY PARTIAL MEDIAL MENISCECTOMY AND PARTIAL LATERAL MENISCECTOMY    Medications prior to admission:   Prior to Admission medications    Medication Sig Start Date End Date Taking? Authorizing Provider   HYDROcodone-acetaminophen (NORCO) 5-325 MG per tablet Take 1 tablet by mouth every 6 hours as needed for Pain for up to 5 days. Intended supply: 5 days.  Take lowest dose possible to manage pain Max Daily Amount: 4 tablets 23 Yes Frank Acuna MD   atorvastatin (LIPITOR) 20 MG tablet Take 1 tablet by mouth daily 23  Syliva Floor, APRN - NP   omeprazole (PRILOSEC) 20 MG delayed release capsule Take 1 capsule by mouth daily    Historical Provider, MD   methenamine (HIPREX) 1 g tablet Take 1 tablet by mouth daily 22   Historical Provider, MD   Lactobacillus (CULTURELLE DIGESTIVE WOMENS) CAPS Take 1 capsule by mouth daily  Patient not taking: Reported on 2023   Karl Winslow PA-C   vitamin D 25 MCG (1000 UT) CAPS Take by mouth    Historical Provider, MD   GLUCOSAMINE-CHONDROITIN ER PO Take by mouth daily    Historical Provider, MD   Ascorbic Acid (VITAMIN C) 1000 MG tablet Take 1 tablet by mouth daily    Historical Provider, MD   Multiple Vitamins-Minerals (MULTIVITAMIN WOMEN 50+ PO) Take by mouth    Historical Provider, MD       Current medications:    Current Facility-Administered Medications   Medication Dose Route Frequency Provider Last Rate Last Admin    sodium chloride flush 0.9 % injection 5-40 mL  5-40 mL IntraVENous 2 times per day Hector Butler MD        sodium chloride flush 0.9 % injection 5-40 mL  5-40 mL IntraVENous PRN Diana Springer MD        0.9 % sodium chloride infusion   IntraVENous

## 2023-06-01 NOTE — H&P
[Caffeine] Other (See Comments)     Heart races    Sulfa Antibiotics     Trimethoprim      Other reaction(s): Unknown    Zocor [Simvastatin] Other (See Comments)     Muscle cramps    Ciprofloxacin Hives and Rash       No current facility-administered medications on file prior to encounter. Current Outpatient Medications on File Prior to Encounter   Medication Sig Dispense Refill    atorvastatin (LIPITOR) 20 MG tablet Take 1 tablet by mouth daily 90 tablet 1    omeprazole (PRILOSEC) 20 MG delayed release capsule Take 1 capsule by mouth daily      methenamine (HIPREX) 1 g tablet Take 1 tablet by mouth daily      Lactobacillus (CULTURELLE DIGESTIVE WOMENS) CAPS Take 1 capsule by mouth daily 30 capsule 3    vitamin D 25 MCG (1000 UT) CAPS Take by mouth      GLUCOSAMINE-CHONDROITIN ER PO Take by mouth daily      Ascorbic Acid (VITAMIN C) 1000 MG tablet Take 1 tablet by mouth daily      Multiple Vitamins-Minerals (MULTIVITAMIN WOMEN 50+ PO) Take by mouth         Review of Systems   Constitutional:  Positive for activity change. Negative for appetite change. HENT: Negative. Eyes: Negative. Respiratory: Negative. Cardiovascular: Negative. Gastrointestinal:  Positive for diarrhea. Genitourinary:         Hx of UTI    Musculoskeletal:  Positive for gait problem. Left knee pain        GENERAL PHYSICAL EXAM     Vitals: see nursing flow sheet for vital signs     GENERAL APPEARANCE:   Jonetta Cushing is 76 y.o.,  female, not obese, nourished, conscious, alert. Does not appear to be distress or pain at this time. Physical Exam  Constitutional:       General: She is not in acute distress. Appearance: Normal appearance. She is normal weight. She is not ill-appearing. HENT:      Head: Normocephalic. Right Ear: External ear normal.      Left Ear: External ear normal.      Nose: Nose normal.      Mouth/Throat:      Mouth: Mucous membranes are dry.    Eyes:

## 2023-06-01 NOTE — DISCHARGE INSTRUCTIONS
Venkatesh Vivar M.D.  [phone]  POST OPERATIVE DISCHARGE INSTRUCTIONS   KNEE ARTHROSCOPY     Follow-up in office seven to ten days after surgery. Call for appointment if not already made. ([phone]). Take pain medication as ordered. Keep the dressing/ace wrap on for 72 hours (3 days). After the 72 hours, may remove ace wrap and dressing, place Band-Aids over sutures and then if desired reapply ace wrap. Start wrapping at the ankle and wrap up to the top of the leg. You may shower, but keep the dressing & wounds dry with plastic bag around knee/leg until seen by Dr. Dianna Mcdaniels. After surgery, it is weight bearing as tolerated, unless instructed differently by Dr. Dianna Mcdaniels after surgery. Use crutches or a walker as needed based on how your knee feels. Be sure to keep your leg elevated when sitting or lying down. Use 2-3 pillows under leg. Ice to surgical site for 20 to 30 minutes four times a day for 48 hours. Dr. Dianna Mcdaniels recommends taking one Aspirin 325 mg daily for 7 days after surgery to help prevent blood clots. Be sure to do your leg exercises daily. Examples of these exercises are in the knee arthroscopy booklet given in Dr. Rachael Brown office. Call Dr. Rachael Brown office if you experience any of the following:  Temperature above 101° F.  Persistent nausea and vomiting. Severe swelling in the knee, calf, or foot. Severe pain that is not relieved by pain medications. DISCHARGE INSTRUCTIONS FOR GENERAL ANESTHESIA    In order to continue your care at home, please follow the instructions below. Anesthesia - Do not drink any alcoholic beverages or make any legal or important decisions for 24 hours. If your surgery was on an extremity or abdomen, do not drive or operate any machinery until your surgeon approves, otherwise do not drive or operate any machinery for 24 hours or as restricted by your surgeon.        Diet -  Start out eating lightly

## 2023-06-02 ENCOUNTER — TELEPHONE (OUTPATIENT)
Dept: ORTHOPEDIC SURGERY | Age: 68
End: 2023-06-02

## 2023-06-05 NOTE — TELEPHONE ENCOUNTER
Can't fax anything to her work due to no KAYLENE. Patient informed and letter created. She will come and sign the release today.

## 2023-06-19 ENCOUNTER — TELEPHONE (OUTPATIENT)
Dept: ADMINISTRATIVE | Age: 68
End: 2023-06-19

## 2023-07-17 ENCOUNTER — TELEPHONE (OUTPATIENT)
Dept: ORTHOPEDIC SURGERY | Age: 68
End: 2023-07-17

## 2023-07-17 NOTE — TELEPHONE ENCOUNTER
Pt called in stating after 1 week of being back to work full time she had to leave early due to the pain in her left knee. Pt had Lt Knee/PMM & PLM 6/1/23.  Pt is wanting to know if dr Adelina Barrios has any advise for her

## 2023-07-18 RX ORDER — METHYLPREDNISOLONE 4 MG/1
TABLET ORAL
Qty: 1 KIT | Refills: 0 | Status: SHIPPED | OUTPATIENT
Start: 2023-07-18 | End: 2023-07-24

## 2023-09-14 ENCOUNTER — HOSPITAL ENCOUNTER (OUTPATIENT)
Dept: MAMMOGRAPHY | Age: 68
Discharge: HOME | End: 2023-09-16
Payer: MEDICARE

## 2023-09-14 DIAGNOSIS — Z12.31 ENCOUNTER FOR SCREENING MAMMOGRAM FOR MALIGNANT NEOPLASM OF BREAST: ICD-10-CM

## 2023-09-14 PROCEDURE — 77063 BREAST TOMOSYNTHESIS BI: CPT

## 2023-09-19 NOTE — PROGRESS NOTES
[facility]  [facility] PRIMARY CARE [facility]  [address] [facility] [address]  Dept: [phone]        CHIEF COMPLAINT:      Chief Complaint   Patient presents with    Established New Doctor     Having some UTI symptoms, burning, but dry in vaginal area       Kelly Hernandez is a 76 y.o. female who presents to establish as new patient. Previous patient of Abram Melendez NP. 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 Urology-Dr Yonatan Conner, has on methenamine and D-Mannose for UTIs. Urine from 10/22 was sensisitive to macrobid. Last saw Dr. Yonatan Conner a couple months ago. She had stopped the methenamine. Was told she could try stopping the methanamine to see if UTI symptoms returned. Was not taking D-mannose either. Stated it made her urethra burn. Is on estrogen cream for dryness. Patient states this was okayed by Dr. Yonatan Conner. Has had bladder sling and rectocele repaired. Has been having burning with urination for a couple weeks. Some blood occasionally on incontinence pad. Denies fever, flank pain, chills. Timing correlates to when she stopped the methanamine. Has urgency, going every hour. Had lengthy discussion of the importance of treating UTI promptly and to follow guidance from Dr. Yonatan Conner in order to prevent these from reoccurring. Sees GYN at [facility]    Omeprazole-Had acid reflux. Is controlled on medication    Is due for colonoscopy. Had colonoscopy in the past. Never had polyps. Had left Knee scope in June Saw Innaphilipp Margarita. Left knee continues to hurts on the inside. Has seen Dr. Pat Weston since then and he was not concerned. Not limited in movements. Pain is better when she is off of it. Does get crepitus in that Knee. The pain pushed her into retiring early. She would like to try physical therapy to see if it is just stiff. Requesting [facility]. Review of Systems   Constitutional:  Negative for chills and fever.

## 2023-09-21 ENCOUNTER — HOSPITAL ENCOUNTER (OUTPATIENT)
Age: 68
Setting detail: SPECIMEN
Discharge: HOME | End: 2023-09-21

## 2023-09-21 ENCOUNTER — OFFICE VISIT (OUTPATIENT)
Dept: FAMILY MEDICINE CLINIC | Age: 68
End: 2023-09-21
Payer: MEDICARE

## 2023-09-21 VITALS
SYSTOLIC BLOOD PRESSURE: 126 MMHG | HEART RATE: 72 BPM | TEMPERATURE: 98.2 F | BODY MASS INDEX: 24.64 KG/M2 | DIASTOLIC BLOOD PRESSURE: 84 MMHG | HEIGHT: 67 IN | OXYGEN SATURATION: 99 % | WEIGHT: 157 LBS

## 2023-09-21 DIAGNOSIS — N39.0 URINARY TRACT INFECTION WITH HEMATURIA, SITE UNSPECIFIED: Primary | ICD-10-CM

## 2023-09-21 DIAGNOSIS — R31.9 URINARY TRACT INFECTION WITH HEMATURIA, SITE UNSPECIFIED: Primary | ICD-10-CM

## 2023-09-21 DIAGNOSIS — Z12.11 COLON CANCER SCREENING: ICD-10-CM

## 2023-09-21 DIAGNOSIS — K21.9 GASTROESOPHAGEAL REFLUX DISEASE WITHOUT ESOPHAGITIS: ICD-10-CM

## 2023-09-21 DIAGNOSIS — N39.0 URINARY TRACT INFECTION WITH HEMATURIA, SITE UNSPECIFIED: ICD-10-CM

## 2023-09-21 DIAGNOSIS — R31.9 URINARY TRACT INFECTION WITH HEMATURIA, SITE UNSPECIFIED: ICD-10-CM

## 2023-09-21 DIAGNOSIS — M25.562 ACUTE PAIN OF LEFT KNEE: ICD-10-CM

## 2023-09-21 PROBLEM — S83.282A ACUTE LATERAL MENISCUS TEAR OF LEFT KNEE: Status: ACTIVE | Noted: 2023-09-21

## 2023-09-21 LAB
BILIRUBIN, POC: ABNORMAL
BLOOD URINE, POC: ABNORMAL
CLARITY, POC: ABNORMAL
COLOR, POC: YELLOW
GLUCOSE URINE, POC: ABNORMAL
KETONES, POC: ABNORMAL
LEUKOCYTE EST, POC: ABNORMAL
NITRITE, POC: ABNORMAL
PH, POC: 5.5
PROTEIN, POC: ABNORMAL
SPECIFIC GRAVITY, POC: >1.03
UROBILINOGEN, POC: 0.2

## 2023-09-21 PROCEDURE — 81003 URINALYSIS AUTO W/O SCOPE: CPT

## 2023-09-21 PROCEDURE — G0008 ADMIN INFLUENZA VIRUS VAC: HCPCS

## 2023-09-21 PROCEDURE — 1123F ACP DISCUSS/DSCN MKR DOCD: CPT

## 2023-09-21 PROCEDURE — 99214 OFFICE O/P EST MOD 30 MIN: CPT

## 2023-09-21 PROCEDURE — 90694 VACC AIIV4 NO PRSRV 0.5ML IM: CPT

## 2023-09-21 RX ORDER — NITROFURANTOIN 25; 75 MG/1; MG/1
100 CAPSULE ORAL 2 TIMES DAILY
Qty: 10 CAPSULE | Refills: 0 | Status: SHIPPED | OUTPATIENT
Start: 2023-09-21 | End: 2023-09-26

## 2023-09-21 ASSESSMENT — ANXIETY QUESTIONNAIRES
5. BEING SO RESTLESS THAT IT IS HARD TO SIT STILL: 0
1. FEELING NERVOUS, ANXIOUS, OR ON EDGE: 0
6. BECOMING EASILY ANNOYED OR IRRITABLE: 0
GAD7 TOTAL SCORE: 0
2. NOT BEING ABLE TO STOP OR CONTROL WORRYING: 0
4. TROUBLE RELAXING: 0
IF YOU CHECKED OFF ANY PROBLEMS ON THIS QUESTIONNAIRE, HOW DIFFICULT HAVE THESE PROBLEMS MADE IT FOR YOU TO DO YOUR WORK, TAKE CARE OF THINGS AT HOME, OR GET ALONG WITH OTHER PEOPLE: NOT DIFFICULT AT ALL
3. WORRYING TOO MUCH ABOUT DIFFERENT THINGS: 0
7. FEELING AFRAID AS IF SOMETHING AWFUL MIGHT HAPPEN: 0

## 2023-09-21 ASSESSMENT — PATIENT HEALTH QUESTIONNAIRE - PHQ9
SUM OF ALL RESPONSES TO PHQ QUESTIONS 1-9: 0
SUM OF ALL RESPONSES TO PHQ9 QUESTIONS 1 & 2: 0
SUM OF ALL RESPONSES TO PHQ QUESTIONS 1-9: 0
1. LITTLE INTEREST OR PLEASURE IN DOING THINGS: 0
SUM OF ALL RESPONSES TO PHQ QUESTIONS 1-9: 0
2. FEELING DOWN, DEPRESSED OR HOPELESS: 0
SUM OF ALL RESPONSES TO PHQ QUESTIONS 1-9: 0

## 2023-09-21 ASSESSMENT — ENCOUNTER SYMPTOMS
GASTROINTESTINAL NEGATIVE: 1
SHORTNESS OF BREATH: 0
ABDOMINAL PAIN: 0

## 2023-09-22 NOTE — RESULT ENCOUNTER NOTE
Is currently on correct antibiotic coverage. Please have patient make a nurse visit late next week to recheck urine after completion of antibiotics.

## 2023-09-23 LAB
MICROORGANISM SPEC CULT: ABNORMAL
SPECIMEN DESCRIPTION: ABNORMAL

## 2023-09-27 ENCOUNTER — TELEPHONE (OUTPATIENT)
Dept: GASTROENTEROLOGY | Age: 68
End: 2023-09-27

## 2023-09-27 NOTE — TELEPHONE ENCOUNTER
Writer rec'd referral from PCP to schedule pt for colon, writer called pt to schedule colon, pt did not answer, Monique Larson requesting return call. 1st attempt; called and LVM for patient regarding colon screen referral.    2nd attempt; mailed colon screen letter to patient's home address.

## 2023-09-29 ENCOUNTER — NURSE ONLY (OUTPATIENT)
Dept: FAMILY MEDICINE CLINIC | Age: 68
End: 2023-09-29

## 2023-09-29 VITALS
WEIGHT: 157 LBS | HEIGHT: 67 IN | HEART RATE: 78 BPM | BODY MASS INDEX: 24.64 KG/M2 | OXYGEN SATURATION: 98 % | TEMPERATURE: 97.6 F

## 2023-09-29 DIAGNOSIS — N39.0 URINARY TRACT INFECTION WITHOUT HEMATURIA, SITE UNSPECIFIED: Primary | ICD-10-CM

## 2023-09-29 DIAGNOSIS — R31.9 URINARY TRACT INFECTION WITH HEMATURIA, SITE UNSPECIFIED: ICD-10-CM

## 2023-09-29 DIAGNOSIS — N39.0 URINARY TRACT INFECTION WITH HEMATURIA, SITE UNSPECIFIED: ICD-10-CM

## 2023-09-29 LAB
BILIRUBIN, POC: ABNORMAL
BLOOD URINE, POC: ABNORMAL
CLARITY, POC: CLEAR
COLOR, POC: YELLOW
GLUCOSE URINE, POC: ABNORMAL
KETONES, POC: ABNORMAL
LEUKOCYTE EST, POC: ABNORMAL
NITRITE, POC: ABNORMAL
PH, POC: 7
PROTEIN, POC: ABNORMAL
SPECIFIC GRAVITY, POC: 1.02
UROBILINOGEN, POC: 0.2

## 2023-09-29 NOTE — PROGRESS NOTES
Urine shows UTI improvement. Is to continue with likely D-mannose with urology for continuing prevention. Is to call office if becomes symptomatic, fever, chills, painful urination, or blood in urine.

## 2023-09-29 NOTE — PROGRESS NOTES
Spoke with pt she did call urology and they changed her medication to Denanous? Pt is to take 1 a day and if she has symptoms to take up to 3 a day. Pt finished her antibiotic yesterday and has no symptoms.      Pt also wanted provider to know she has cont with PT for knee and is doing wonderful and will be joining a gym with a pool

## 2023-09-29 NOTE — PROGRESS NOTES
Patient presents in office to re-check urine. Results will be in chart. Writer let pt know we will give call later with results and or recommendations when provider gets back to us.

## 2023-10-20 NOTE — TELEPHONE ENCOUNTER
Patient is s/p C7-T1 ACDF done on 9/26/2023. Opioid contracted completed. [place]. Fill date to be 10/22/2023. Portal message sent notifying patient.    Patient states her work is now requesting a letter releasing her to go back to work after quarantine. Please advise.

## 2023-10-23 DIAGNOSIS — E78.5 HYPERLIPIDEMIA, UNSPECIFIED HYPERLIPIDEMIA TYPE: ICD-10-CM

## 2023-10-23 RX ORDER — ATORVASTATIN CALCIUM 20 MG/1
20 TABLET, FILM COATED ORAL DAILY
Qty: 90 TABLET | Refills: 1 | Status: SHIPPED | OUTPATIENT
Start: 2023-10-23 | End: 2024-10-22

## 2023-10-23 NOTE — TELEPHONE ENCOUNTER
LOV 9/29/23   RTO Not scheduled  LRF 04/13/23          Controlled Substance Monitoring:    Acute and Chronic Pain Monitoring:        No data to display

## 2023-12-01 ENCOUNTER — OFFICE VISIT (OUTPATIENT)
Dept: PRIMARY CARE CLINIC | Age: 68
End: 2023-12-01
Payer: MEDICARE

## 2023-12-01 VITALS
TEMPERATURE: 98.2 F | OXYGEN SATURATION: 99 % | BODY MASS INDEX: 26 KG/M2 | HEART RATE: 84 BPM | SYSTOLIC BLOOD PRESSURE: 126 MMHG | DIASTOLIC BLOOD PRESSURE: 76 MMHG | WEIGHT: 166 LBS

## 2023-12-01 DIAGNOSIS — J40 BRONCHITIS: ICD-10-CM

## 2023-12-01 DIAGNOSIS — J02.9 SORE THROAT: Primary | ICD-10-CM

## 2023-12-01 PROCEDURE — 99213 OFFICE O/P EST LOW 20 MIN: CPT | Performed by: PHYSICIAN ASSISTANT

## 2023-12-01 PROCEDURE — G8400 PT W/DXA NO RESULTS DOC: HCPCS | Performed by: PHYSICIAN ASSISTANT

## 2023-12-01 PROCEDURE — 3017F COLORECTAL CA SCREEN DOC REV: CPT | Performed by: PHYSICIAN ASSISTANT

## 2023-12-01 PROCEDURE — G8484 FLU IMMUNIZE NO ADMIN: HCPCS | Performed by: PHYSICIAN ASSISTANT

## 2023-12-01 PROCEDURE — 1090F PRES/ABSN URINE INCON ASSESS: CPT | Performed by: PHYSICIAN ASSISTANT

## 2023-12-01 PROCEDURE — G8427 DOCREV CUR MEDS BY ELIG CLIN: HCPCS | Performed by: PHYSICIAN ASSISTANT

## 2023-12-01 PROCEDURE — 1036F TOBACCO NON-USER: CPT | Performed by: PHYSICIAN ASSISTANT

## 2023-12-01 PROCEDURE — 1123F ACP DISCUSS/DSCN MKR DOCD: CPT | Performed by: PHYSICIAN ASSISTANT

## 2023-12-01 PROCEDURE — G8417 CALC BMI ABV UP PARAM F/U: HCPCS | Performed by: PHYSICIAN ASSISTANT

## 2023-12-01 RX ORDER — BENZONATATE 200 MG/1
200 CAPSULE ORAL 3 TIMES DAILY PRN
Qty: 21 CAPSULE | Refills: 0 | Status: SHIPPED | OUTPATIENT
Start: 2023-12-01 | End: 2023-12-08

## 2023-12-01 RX ORDER — AZITHROMYCIN 250 MG/1
250 TABLET, FILM COATED ORAL SEE ADMIN INSTRUCTIONS
Qty: 6 TABLET | Refills: 0 | Status: SHIPPED | OUTPATIENT
Start: 2023-12-01 | End: 2023-12-06

## 2023-12-01 RX ORDER — PREDNISONE 20 MG/1
20 TABLET ORAL 2 TIMES DAILY
Qty: 10 TABLET | Refills: 0 | Status: SHIPPED | OUTPATIENT
Start: 2023-12-01 | End: 2023-12-06

## 2023-12-01 ASSESSMENT — ENCOUNTER SYMPTOMS
COUGH: 1
SHORTNESS OF BREATH: 1
SINUS PAIN: 0
NAUSEA: 1
EYES NEGATIVE: 1
SORE THROAT: 1

## 2023-12-05 ENCOUNTER — OFFICE VISIT (OUTPATIENT)
Dept: FAMILY MEDICINE CLINIC | Age: 68
End: 2023-12-05
Payer: MEDICARE

## 2023-12-05 VITALS
DIASTOLIC BLOOD PRESSURE: 76 MMHG | WEIGHT: 167 LBS | BODY MASS INDEX: 26.21 KG/M2 | HEIGHT: 67 IN | HEART RATE: 74 BPM | TEMPERATURE: 98.3 F | SYSTOLIC BLOOD PRESSURE: 122 MMHG | OXYGEN SATURATION: 99 %

## 2023-12-05 DIAGNOSIS — R39.9 UTI SYMPTOMS: Primary | ICD-10-CM

## 2023-12-05 DIAGNOSIS — R35.0 URINE FREQUENCY: ICD-10-CM

## 2023-12-05 DIAGNOSIS — R63.1 EXCESSIVE THIRST: ICD-10-CM

## 2023-12-05 LAB
BILIRUBIN, POC: ABNORMAL
BLOOD URINE, POC: ABNORMAL
CLARITY, POC: CLEAR
COLOR, POC: YELLOW
GLUCOSE URINE, POC: ABNORMAL
HBA1C MFR BLD: 6 %
KETONES, POC: ABNORMAL
LEUKOCYTE EST, POC: ABNORMAL
NITRITE, POC: ABNORMAL
PH, POC: 6
PROTEIN, POC: ABNORMAL
SPECIFIC GRAVITY, POC: >1.03
UROBILINOGEN, POC: 0.2

## 2023-12-05 PROCEDURE — 1036F TOBACCO NON-USER: CPT

## 2023-12-05 PROCEDURE — G8484 FLU IMMUNIZE NO ADMIN: HCPCS

## 2023-12-05 PROCEDURE — G8400 PT W/DXA NO RESULTS DOC: HCPCS

## 2023-12-05 PROCEDURE — 99213 OFFICE O/P EST LOW 20 MIN: CPT

## 2023-12-05 PROCEDURE — 3017F COLORECTAL CA SCREEN DOC REV: CPT

## 2023-12-05 PROCEDURE — 1123F ACP DISCUSS/DSCN MKR DOCD: CPT

## 2023-12-05 PROCEDURE — 1090F PRES/ABSN URINE INCON ASSESS: CPT

## 2023-12-05 PROCEDURE — G8427 DOCREV CUR MEDS BY ELIG CLIN: HCPCS

## 2023-12-05 PROCEDURE — G8417 CALC BMI ABV UP PARAM F/U: HCPCS

## 2023-12-05 PROCEDURE — 83036 HEMOGLOBIN GLYCOSYLATED A1C: CPT

## 2023-12-05 PROCEDURE — 81003 URINALYSIS AUTO W/O SCOPE: CPT

## 2023-12-05 ASSESSMENT — PATIENT HEALTH QUESTIONNAIRE - PHQ9
SUM OF ALL RESPONSES TO PHQ QUESTIONS 1-9: 0
SUM OF ALL RESPONSES TO PHQ QUESTIONS 1-9: 0
2. FEELING DOWN, DEPRESSED OR HOPELESS: 0
SUM OF ALL RESPONSES TO PHQ QUESTIONS 1-9: 0
SUM OF ALL RESPONSES TO PHQ QUESTIONS 1-9: 0
SUM OF ALL RESPONSES TO PHQ9 QUESTIONS 1 & 2: 0
1. LITTLE INTEREST OR PLEASURE IN DOING THINGS: 0

## 2023-12-05 ASSESSMENT — ENCOUNTER SYMPTOMS
SHORTNESS OF BREATH: 0
WHEEZING: 0
GASTROINTESTINAL NEGATIVE: 1

## 2023-12-05 NOTE — PROGRESS NOTES
[facility]  [facility] PRIMARY CARE [address]  [address] [address]  Dept: [phone]        CHIEF COMPLAINT:      Chief Complaint   Patient presents with    Urinary Frequency     Excessive thirst, been awhile, has had bladder and rectal surgery       SUBJECTIVE      Man Soto is a 76 y.o. female who presents for excessive thirst and urination  Patient dose see urology-Dr. Jessa French for frequent UTIs. Was taken off methenamine and is now just on D-Mannose. No burning with urination. No blood in urine. Urine is yellow. Uses a pessary daily and has a bladder sling and rectocele. Had complete hysterectomy. Patient states the bladder frequency and thirst has been going on for a while. Has has started have burning in both of her feet. Can't wear shoes that are tight on top of her toes. Feels like her feet are swelling up. No open sores on feet or non healing wounds. Patient states that her diet is not good. Does eat a lot of sugar. Does not drink any sugar. Review of Systems   Constitutional:  Negative for fatigue and unexpected weight change. HENT: Negative. Respiratory:  Negative for shortness of breath and wheezing. Cardiovascular:  Negative for chest pain and palpitations. Gastrointestinal: Negative. Endocrine: Positive for polydipsia. Genitourinary:  Positive for frequency. Musculoskeletal: Negative. Skin: Negative. Neurological:  Positive for numbness. Psychiatric/Behavioral: Negative.           HISTORY:        Past Medical History:   Diagnosis Date    Arthritis     lower back and knee    Elevated cholesterol     on rx per pcp    Esophageal stricture     GERD (gastroesophageal reflux disease)     otc prilosec    Posterior vaginal wall prolapse     Presence of pessary     Snores     denies apnea    Vaginal enterocele     Wears prescription eyeglasses     Wellness examination     Domingo WARNER  last seen 8/19/2020        Past Surgical History:

## 2024-01-16 ENCOUNTER — OFFICE VISIT (OUTPATIENT)
Dept: FAMILY MEDICINE CLINIC | Age: 69
End: 2024-01-16
Payer: MEDICARE

## 2024-01-16 VITALS
TEMPERATURE: 97.3 F | SYSTOLIC BLOOD PRESSURE: 118 MMHG | DIASTOLIC BLOOD PRESSURE: 78 MMHG | HEART RATE: 68 BPM | WEIGHT: 168 LBS | OXYGEN SATURATION: 98 % | HEIGHT: 67 IN | BODY MASS INDEX: 26.37 KG/M2

## 2024-01-16 DIAGNOSIS — G43.109 OCULAR MIGRAINE: Primary | ICD-10-CM

## 2024-01-16 DIAGNOSIS — Z23 NEED FOR STREPTOCOCCUS PNEUMONIAE VACCINATION: ICD-10-CM

## 2024-01-16 PROCEDURE — 1036F TOBACCO NON-USER: CPT

## 2024-01-16 PROCEDURE — 1123F ACP DISCUSS/DSCN MKR DOCD: CPT

## 2024-01-16 PROCEDURE — G8400 PT W/DXA NO RESULTS DOC: HCPCS

## 2024-01-16 PROCEDURE — G0009 ADMIN PNEUMOCOCCAL VACCINE: HCPCS

## 2024-01-16 PROCEDURE — G8484 FLU IMMUNIZE NO ADMIN: HCPCS

## 2024-01-16 PROCEDURE — 99213 OFFICE O/P EST LOW 20 MIN: CPT

## 2024-01-16 PROCEDURE — 1090F PRES/ABSN URINE INCON ASSESS: CPT

## 2024-01-16 PROCEDURE — 90677 PCV20 VACCINE IM: CPT

## 2024-01-16 PROCEDURE — 3017F COLORECTAL CA SCREEN DOC REV: CPT

## 2024-01-16 PROCEDURE — G8427 DOCREV CUR MEDS BY ELIG CLIN: HCPCS

## 2024-01-16 PROCEDURE — G8417 CALC BMI ABV UP PARAM F/U: HCPCS

## 2024-01-16 ASSESSMENT — PATIENT HEALTH QUESTIONNAIRE - PHQ9
1. LITTLE INTEREST OR PLEASURE IN DOING THINGS: 0
SUM OF ALL RESPONSES TO PHQ QUESTIONS 1-9: 0
2. FEELING DOWN, DEPRESSED OR HOPELESS: 0
SUM OF ALL RESPONSES TO PHQ QUESTIONS 1-9: 0
SUM OF ALL RESPONSES TO PHQ9 QUESTIONS 1 & 2: 0

## 2024-01-16 ASSESSMENT — ENCOUNTER SYMPTOMS
WHEEZING: 0
SHORTNESS OF BREATH: 0

## 2024-01-16 NOTE — PROGRESS NOTES
[facility] PRIMARY [address] [address]  Dept: [phone]        CHIEF COMPLAINT:      Chief Complaint   Patient presents with    Eye Problem     Left eye last week was having an headache and saw glass refection in vision, saw eye doctor, this week had sharp pain headache in right ear.        SUBJECTIVE      Dot Smith is a 68 y.o. female who presents for vision issues. Patients eye exam report reviewed.  Patient was seen by ophthalmology on January 11, 2024 with a normal eye exam but had complaints of vision issues with headaches.  He deferred to PCP as exam was normal. Patient states she was asked by her opthmologist for signs of heart attack and stroke.     Patient states had headache with glass reflection in vision last week.  States she had a reflection out of left eye. She finds it difficult to describe. She was not able to see from left peripheral portion of eye. The headache at that time was around left temple.  This past weekend Has been having sharp pain headache in right ear this week. Is around night time. No vision changes. Has not been taking anything for the headaches. Notices the headaches around weather changes.   Is trying to watch her sugars. Is drinking water.       Review of Systems   Constitutional:  Negative for fatigue.   Respiratory:  Negative for shortness of breath and wheezing.    Cardiovascular:  Negative for chest pain and palpitations.   Neurological:  Positive for headaches. Negative for dizziness, tremors, seizures, syncope, facial asymmetry, weakness and numbness.   Psychiatric/Behavioral:  Negative for confusion, decreased concentration, dysphoric mood and hallucinations. The patient is not nervous/anxious and is not hyperactive.         HISTORY:        Past Medical History:   Diagnosis Date    Arthritis     lower back and knee    Elevated cholesterol     on rx per pcp    Esophageal stricture     GERD (gastroesophageal

## 2024-02-27 ENCOUNTER — OFFICE VISIT (OUTPATIENT)
Dept: FAMILY MEDICINE CLINIC | Age: 69
End: 2024-02-27
Payer: MEDICARE

## 2024-02-27 ENCOUNTER — HOSPITAL ENCOUNTER (OUTPATIENT)
Age: 69
Setting detail: SPECIMEN
Discharge: HOME | End: 2024-02-27

## 2024-02-27 VITALS
OXYGEN SATURATION: 96 % | HEIGHT: 67 IN | TEMPERATURE: 98 F | HEART RATE: 74 BPM | BODY MASS INDEX: 27 KG/M2 | DIASTOLIC BLOOD PRESSURE: 84 MMHG | WEIGHT: 172 LBS | SYSTOLIC BLOOD PRESSURE: 124 MMHG

## 2024-02-27 DIAGNOSIS — R73.03 PRE-DIABETES: ICD-10-CM

## 2024-02-27 DIAGNOSIS — Z13.820 OSTEOPOROSIS SCREENING: ICD-10-CM

## 2024-02-27 DIAGNOSIS — E78.5 HYPERLIPIDEMIA, UNSPECIFIED HYPERLIPIDEMIA TYPE: ICD-10-CM

## 2024-02-27 DIAGNOSIS — N95.9 UNSPECIFIED MENOPAUSAL AND PERIMENOPAUSAL DISORDER: ICD-10-CM

## 2024-02-27 DIAGNOSIS — Z12.11 COLON CANCER SCREENING: ICD-10-CM

## 2024-02-27 DIAGNOSIS — Z00.00 MEDICARE ANNUAL WELLNESS VISIT, SUBSEQUENT: Primary | ICD-10-CM

## 2024-02-27 LAB
ALBUMIN SERPL-MCNC: 4.5 G/DL (ref 3.5–5.2)
ALP SERPL-CCNC: 153 U/L (ref 35–104)
ALT SERPL-CCNC: 40 U/L (ref 10–35)
ANION GAP SERPL CALCULATED.3IONS-SCNC: 11 MMOL/L (ref 9–16)
AST SERPL-CCNC: 35 U/L (ref 10–35)
BILIRUB SERPL-MCNC: 0.5 MG/DL (ref 0–1.2)
BUN SERPL-MCNC: 12 MG/DL (ref 8–23)
CALCIUM SERPL-MCNC: 9.6 MG/DL (ref 8.6–10.4)
CHLORIDE SERPL-SCNC: 103 MMOL/L (ref 98–107)
CHOLEST SERPL-MCNC: 179 MG/DL (ref 0–199)
CHOLESTEROL/HDL RATIO: 4
CO2 SERPL-SCNC: 29 MMOL/L (ref 20–31)
CREAT SERPL-MCNC: 0.7 MG/DL (ref 0.5–0.9)
ERYTHROCYTE [DISTWIDTH] IN BLOOD BY AUTOMATED COUNT: 13.1 % (ref 11.8–14.4)
EST. AVERAGE GLUCOSE BLD GHB EST-MCNC: 114 MG/DL
GFR SERPL CREATININE-BSD FRML MDRD: >60 ML/MIN/1.73M2
GLUCOSE SERPL-MCNC: 90 MG/DL (ref 74–99)
HBA1C MFR BLD: 5.6 % (ref 4–6)
HCT VFR BLD AUTO: 45 % (ref 36.3–47.1)
HDLC SERPL-MCNC: 45 MG/DL
HGB BLD-MCNC: 14.3 G/DL (ref 11.9–15.1)
LDLC SERPL CALC-MCNC: 98 MG/DL (ref 0–100)
MCH RBC QN AUTO: 28.8 PG (ref 25.2–33.5)
MCHC RBC AUTO-ENTMCNC: 31.8 G/DL (ref 28.4–34.8)
MCV RBC AUTO: 90.7 FL (ref 82.6–102.9)
NRBC BLD-RTO: 0 PER 100 WBC
PLATELET # BLD AUTO: 181 K/UL (ref 138–453)
PMV BLD AUTO: 12.2 FL (ref 8.1–13.5)
POTASSIUM SERPL-SCNC: 4.1 MMOL/L (ref 3.7–5.3)
PROT SERPL-MCNC: 7.1 G/DL (ref 6.6–8.7)
RBC # BLD AUTO: 4.96 M/UL (ref 3.95–5.11)
SODIUM SERPL-SCNC: 143 MMOL/L (ref 136–145)
TRIGL SERPL-MCNC: 183 MG/DL (ref 0–149)
VLDLC SERPL CALC-MCNC: 37 MG/DL
WBC OTHER # BLD: 6.8 K/UL (ref 3.5–11.3)

## 2024-02-27 PROCEDURE — G8484 FLU IMMUNIZE NO ADMIN: HCPCS

## 2024-02-27 PROCEDURE — G0439 PPPS, SUBSEQ VISIT: HCPCS

## 2024-02-27 PROCEDURE — 3017F COLORECTAL CA SCREEN DOC REV: CPT

## 2024-02-27 PROCEDURE — 1123F ACP DISCUSS/DSCN MKR DOCD: CPT

## 2024-02-27 RX ORDER — ATORVASTATIN CALCIUM 20 MG/1
20 TABLET, FILM COATED ORAL DAILY
Qty: 90 TABLET | Refills: 1 | Status: SHIPPED | OUTPATIENT
Start: 2024-02-27 | End: 2025-02-26

## 2024-02-27 RX ORDER — MAGNESIUM 30 MG
30 TABLET ORAL 2 TIMES DAILY
COMMUNITY

## 2024-02-27 ASSESSMENT — PATIENT HEALTH QUESTIONNAIRE - PHQ9
SUM OF ALL RESPONSES TO PHQ QUESTIONS 1-9: 0
SUM OF ALL RESPONSES TO PHQ9 QUESTIONS 1 & 2: 0
1. LITTLE INTEREST OR PLEASURE IN DOING THINGS: 0
SUM OF ALL RESPONSES TO PHQ QUESTIONS 1-9: 0
2. FEELING DOWN, DEPRESSED OR HOPELESS: 0

## 2024-02-27 ASSESSMENT — LIFESTYLE VARIABLES
HOW OFTEN DO YOU HAVE A DRINK CONTAINING ALCOHOL: NEVER
HOW MANY STANDARD DRINKS CONTAINING ALCOHOL DO YOU HAVE ON A TYPICAL DAY: PATIENT DOES NOT DRINK

## 2024-02-27 NOTE — PROGRESS NOTES
GLUCOSAMINE-CHONDROITIN ER PO Take by mouth daily Yes ProviderBoby MD   Ascorbic Acid (VITAMIN C) 1000 MG tablet Take 1 tablet by mouth daily Yes ProviderBoby MD   Multiple Vitamins-Minerals (MULTIVITAMIN WOMEN 50+ PO) Take by mouth Yes Provider, Historical, MD       CareTeam (Including outside providers/suppliers regularly involved in providing care):   Patient Care Team:  Christal Figueroa APRN - CNP as PCP - General (Nurse Practitioner Family)  Christal Figueroa APRN - CNP as PCP - Empaneled Provider     Reviewed and updated this visit:  Tobacco  Allergies  Meds  Problems  Med Hx  Surg Hx  Soc Hx  Fam Hx           Information for Will/POA given.  Update labs  Order for Dexa and Colonoscopy given  Encouraged healthy diet and exercise.

## 2024-02-27 NOTE — PATIENT INSTRUCTIONS
the amount you walk every day. Try for at least 30 minutes on most days of the week. You also may want to swim, bike, or do other activities.     Do not smoke. If you need help quitting, talk to your doctor about stop-smoking programs and medicines. These can increase your chances of quitting for good. Quitting smoking may be the most important step you can take to protect your heart. It is never too late to quit.     Limit alcohol to 2 drinks a day for men and 1 drink a day for women. Too much alcohol can cause health problems.     Manage other health problems such as diabetes, high blood pressure, and high cholesterol. If you think you may have a problem with alcohol or drug use, talk to your doctor.   Medicines    Take your medicines exactly as prescribed. Call your doctor if you think you are having a problem with your medicine.     If your doctor recommends aspirin, take the amount directed each day. Make sure you take aspirin and not another kind of pain reliever, such as acetaminophen (Tylenol).   When should you call for help?   Call 911 if you have symptoms of a heart attack. These may include:    Chest pain or pressure, or a strange feeling in the chest.     Sweating.     Shortness of breath.     Pain, pressure, or a strange feeling in the back, neck, jaw, or upper belly or in one or both shoulders or arms.     Lightheadedness or sudden weakness.     A fast or irregular heartbeat.   After you call 911, the  may tell you to chew 1 adult-strength or 2 to 4 low-dose aspirin. Wait for an ambulance. Do not try to drive yourself.  Watch closely for changes in your health, and be sure to contact your doctor if you have any problems.  Where can you learn more?  Go to https://www.CareLuLu.net/patientEd and enter F075 to learn more about \"A Healthy Heart: Care Instructions.\"  Current as of: June 25, 2023               Content Version: 13.9  © 1755-3271 [employer].   Care instructions adapted

## 2024-02-28 NOTE — RESULT ENCOUNTER NOTE
Your labs are stable. Continue to work on healthy diet and exercise. Your liver enzymes are still elevated-I would like you to take only half of your Atorvastatin (currently taking 20mg, would like her on 10mg) as cholesterol medications can cause your liver enzymes to elevate. . If you are unable to break the pill in half please let me know. I know you had just gotten a refill so Just trying to use the pills you already have. You can buy a pill cutter OTC. 
TORRES

## 2024-04-24 ENCOUNTER — TELEPHONE (OUTPATIENT)
Dept: FAMILY MEDICINE CLINIC | Age: 69
End: 2024-04-24

## 2024-04-24 DIAGNOSIS — E78.5 HYPERLIPIDEMIA, UNSPECIFIED HYPERLIPIDEMIA TYPE: Primary | ICD-10-CM

## 2024-04-24 RX ORDER — ATORVASTATIN CALCIUM 10 MG/1
10 TABLET, FILM COATED ORAL DAILY
Qty: 30 TABLET | Refills: 2 | Status: SHIPPED | OUTPATIENT
Start: 2024-04-24

## 2024-04-24 NOTE — TELEPHONE ENCOUNTER
Patient ( Dot) stopped in the office. She would like you to call in her  Atovastatiin 10 mg to [facility] in [place]. You recommended on 2/27   for her   to take only half of your Atorvastatin (currently taking 20mg, would like her on 10mg) as cholesterol medications can cause your liver enzymes to elevate. . If you are unable to break the pill in half please let me know. I know you had just gotten a refill so Just trying to use the pills you already have. You can buy a pill cutter OTC.    Patient stated that they are really small and she would like the 10 mg. Called in          Not changed in her medication list so no medication is attached

## 2024-05-03 ENCOUNTER — TELEPHONE (OUTPATIENT)
Dept: FAMILY MEDICINE CLINIC | Age: 69
End: 2024-05-03

## 2024-05-03 NOTE — TELEPHONE ENCOUNTER
Spoke with pt message given that medication will cover staph infection in her urine. Pt said she is not as hydrated as normal but she will drink more fluids. No fever but is having some hot flashes. Informed pt that vomiting can be caused of the UTI. Pt said it was later after her appt when she was thinking about everything she became nauseated. Advise pt to go to a urgent care over weekend if symptoms get worse, and cont to stay hydrated and take medication as prescribed. Pt said she would do so.

## 2024-05-03 NOTE — TELEPHONE ENCOUNTER
The augmentin will cover the staph in her urine. Is she staying hydrated? Fever? The vomiting feeling can be due to the UTI. If symptoms are concerning to her she can go to walk in clinic today.

## 2024-05-03 NOTE — TELEPHONE ENCOUNTER
Patient is calling because she was seen by her urologist Dr Fox ([facility]) and he is treating her for a UTI but he told her she also has a staph infection and he does not treat that to call our office. She states she thinks that the staph is in her urine culture. She then told me that she has been short of breath and feeling like she is going to pass out and sometimes feels like she is going to vomit. She is on antibiotics for the UTI but was given another one from the urologist, it is augmentin.    She was seen at [facility] 4/27/2024 and that had the urine culture. She was given Macrobid to start that same day.   I did tell her if she continues to have shortness of breath and feeling like passing out she needs to be seen at the ER.

## 2024-06-12 ENCOUNTER — APPOINTMENT (OUTPATIENT)
Dept: CT IMAGING | Age: 69
End: 2024-06-12
Payer: MEDICARE

## 2024-06-12 ENCOUNTER — TELEPHONE (OUTPATIENT)
Dept: FAMILY MEDICINE CLINIC | Age: 69
End: 2024-06-12

## 2024-06-12 ENCOUNTER — HOSPITAL ENCOUNTER (EMERGENCY)
Age: 69
Discharge: HOME | End: 2024-06-12
Attending: EMERGENCY MEDICINE
Payer: MEDICARE

## 2024-06-12 VITALS
OXYGEN SATURATION: 97 % | BODY MASS INDEX: 26.99 KG/M2 | HEART RATE: 68 BPM | DIASTOLIC BLOOD PRESSURE: 82 MMHG | TEMPERATURE: 97.9 F | HEIGHT: 67 IN | RESPIRATION RATE: 16 BRPM | WEIGHT: 171.96 LBS | SYSTOLIC BLOOD PRESSURE: 146 MMHG

## 2024-06-12 DIAGNOSIS — N39.0 URINARY TRACT INFECTION WITH HEMATURIA, SITE UNSPECIFIED: Primary | ICD-10-CM

## 2024-06-12 DIAGNOSIS — R00.2 PALPITATIONS: ICD-10-CM

## 2024-06-12 DIAGNOSIS — R31.9 URINARY TRACT INFECTION WITH HEMATURIA, SITE UNSPECIFIED: Primary | ICD-10-CM

## 2024-06-12 LAB
ANION GAP SERPL CALCULATED.3IONS-SCNC: 10 MMOL/L (ref 9–17)
BACTERIA URNS QL MICRO: ABNORMAL
BASOPHILS # BLD: 0 K/UL (ref 0–0.2)
BASOPHILS NFR BLD: 1 % (ref 0–2)
BILIRUB UR QL STRIP: NEGATIVE
BUN SERPL-MCNC: 12 MG/DL (ref 8–23)
CALCIUM SERPL-MCNC: 9.2 MG/DL (ref 8.6–10.4)
CHARACTER UR: ABNORMAL
CHLORIDE SERPL-SCNC: 104 MMOL/L (ref 98–107)
CO2 SERPL-SCNC: 26 MMOL/L (ref 20–31)
COLOR UR: YELLOW
CREAT SERPL-MCNC: 0.7 MG/DL (ref 0.5–0.9)
D DIMER PPP FEU-MCNC: 0.86 UG/ML FEU
EOSINOPHIL # BLD: 0.1 K/UL (ref 0–0.4)
EOSINOPHILS RELATIVE PERCENT: 1 % (ref 1–4)
ERYTHROCYTE [DISTWIDTH] IN BLOOD BY AUTOMATED COUNT: 13.1 % (ref 12.5–15.4)
GFR, ESTIMATED: >90 ML/MIN/1.73M2
GLUCOSE SERPL-MCNC: 90 MG/DL (ref 70–99)
GLUCOSE UR STRIP-MCNC: NEGATIVE MG/DL
HCT VFR BLD AUTO: 38.9 % (ref 36–46)
HGB BLD-MCNC: 12.7 G/DL (ref 12–16)
HGB UR QL STRIP.AUTO: ABNORMAL
KETONES UR STRIP-MCNC: NEGATIVE MG/DL
LEUKOCYTE ESTERASE UR QL STRIP: ABNORMAL
LYMPHOCYTES NFR BLD: 2.4 K/UL (ref 1–4.8)
LYMPHOCYTES RELATIVE PERCENT: 34 % (ref 24–44)
MAGNESIUM SERPL-MCNC: 2 MG/DL (ref 1.6–2.6)
MCH RBC QN AUTO: 28.5 PG (ref 26–34)
MCHC RBC AUTO-ENTMCNC: 32.6 G/DL (ref 31–37)
MCV RBC AUTO: 87.3 FL (ref 80–100)
MONOCYTES NFR BLD: 0.6 K/UL (ref 0.1–1.2)
MONOCYTES NFR BLD: 9 % (ref 2–11)
NEUTROPHILS NFR BLD: 55 % (ref 36–66)
NEUTS SEG NFR BLD: 3.8 K/UL (ref 1.8–7.7)
NITRITE UR QL STRIP: NEGATIVE
PH UR STRIP: 5.5 [PH] (ref 5–8)
PLATELET # BLD AUTO: 171 K/UL (ref 140–450)
PMV BLD AUTO: 10.5 FL (ref 6–12)
POTASSIUM SERPL-SCNC: 3.4 MMOL/L (ref 3.7–5.3)
PROT UR STRIP-MCNC: ABNORMAL MG/DL
RBC # BLD AUTO: 4.46 M/UL (ref 4–5.2)
RBC #/AREA URNS HPF: ABNORMAL /HPF (ref 0–2)
SODIUM SERPL-SCNC: 140 MMOL/L (ref 135–144)
SP GR UR STRIP: 1.03 (ref 1–1.03)
TROPONIN I SERPL HS-MCNC: 7 NG/L (ref 0–14)
TSH SERPL DL<=0.05 MIU/L-ACNC: 1.82 UIU/ML (ref 0.3–5)
UROBILINOGEN UR STRIP-ACNC: NORMAL EU/DL (ref 0–1)
WBC #/AREA URNS HPF: ABNORMAL /HPF (ref 0–5)
WBC OTHER # BLD: 7 K/UL (ref 3.5–11)

## 2024-06-12 PROCEDURE — 71260 CT THORAX DX C+: CPT

## 2024-06-12 PROCEDURE — 99285 EMERGENCY DEPT VISIT HI MDM: CPT | Performed by: EMERGENCY MEDICINE

## 2024-06-12 PROCEDURE — 80048 BASIC METABOLIC PNL TOTAL CA: CPT

## 2024-06-12 PROCEDURE — 2580000003 HC RX 258: Performed by: EMERGENCY MEDICINE

## 2024-06-12 PROCEDURE — 85379 FIBRIN DEGRADATION QUANT: CPT

## 2024-06-12 PROCEDURE — 6360000004 HC RX CONTRAST MEDICATION: Performed by: EMERGENCY MEDICINE

## 2024-06-12 PROCEDURE — 87086 URINE CULTURE/COLONY COUNT: CPT

## 2024-06-12 PROCEDURE — 36415 COLL VENOUS BLD VENIPUNCTURE: CPT

## 2024-06-12 PROCEDURE — 6360000002 HC RX W HCPCS: Performed by: NURSE PRACTITIONER

## 2024-06-12 PROCEDURE — 87088 URINE BACTERIA CULTURE: CPT

## 2024-06-12 PROCEDURE — 85025 COMPLETE CBC W/AUTO DIFF WBC: CPT

## 2024-06-12 PROCEDURE — 2580000003 HC RX 258: Performed by: NURSE PRACTITIONER

## 2024-06-12 PROCEDURE — 96366 THER/PROPH/DIAG IV INF ADDON: CPT | Performed by: EMERGENCY MEDICINE

## 2024-06-12 PROCEDURE — 84443 ASSAY THYROID STIM HORMONE: CPT

## 2024-06-12 PROCEDURE — 84439 ASSAY OF FREE THYROXINE: CPT

## 2024-06-12 PROCEDURE — 87186 SC STD MICRODIL/AGAR DIL: CPT

## 2024-06-12 PROCEDURE — 84484 ASSAY OF TROPONIN QUANT: CPT

## 2024-06-12 PROCEDURE — 83735 ASSAY OF MAGNESIUM: CPT

## 2024-06-12 PROCEDURE — 96365 THER/PROPH/DIAG IV INF INIT: CPT | Performed by: EMERGENCY MEDICINE

## 2024-06-12 PROCEDURE — 93005 ELECTROCARDIOGRAM TRACING: CPT | Performed by: NURSE PRACTITIONER

## 2024-06-12 PROCEDURE — 87040 BLOOD CULTURE FOR BACTERIA: CPT

## 2024-06-12 PROCEDURE — 96361 HYDRATE IV INFUSION ADD-ON: CPT | Performed by: EMERGENCY MEDICINE

## 2024-06-12 PROCEDURE — 81001 URINALYSIS AUTO W/SCOPE: CPT

## 2024-06-12 RX ORDER — SODIUM CHLORIDE 0.9 % (FLUSH) 0.9 %
10 SYRINGE (ML) INJECTION PRN
Status: DISCONTINUED | OUTPATIENT
Start: 2024-06-12 | End: 2024-06-13 | Stop reason: HOSPADM

## 2024-06-12 RX ORDER — 0.9 % SODIUM CHLORIDE 0.9 %
1000 INTRAVENOUS SOLUTION INTRAVENOUS ONCE
Status: COMPLETED | OUTPATIENT
Start: 2024-06-12 | End: 2024-06-12

## 2024-06-12 RX ORDER — CEPHALEXIN 500 MG/1
500 CAPSULE ORAL 2 TIMES DAILY
Qty: 20 CAPSULE | Refills: 0 | Status: SHIPPED | OUTPATIENT
Start: 2024-06-12 | End: 2024-06-22

## 2024-06-12 RX ORDER — 0.9 % SODIUM CHLORIDE 0.9 %
80 INTRAVENOUS SOLUTION INTRAVENOUS ONCE
Status: DISCONTINUED | OUTPATIENT
Start: 2024-06-12 | End: 2024-06-13 | Stop reason: HOSPADM

## 2024-06-12 RX ADMIN — Medication 80 ML: at 20:03

## 2024-06-12 RX ADMIN — SODIUM CHLORIDE 1000 ML: 9 INJECTION, SOLUTION INTRAVENOUS at 17:37

## 2024-06-12 RX ADMIN — CEFTRIAXONE SODIUM 1000 MG: 1 INJECTION, POWDER, FOR SOLUTION INTRAMUSCULAR; INTRAVENOUS at 21:35

## 2024-06-12 RX ADMIN — SODIUM CHLORIDE, PRESERVATIVE FREE 10 ML: 5 INJECTION INTRAVENOUS at 20:03

## 2024-06-12 RX ADMIN — IOPAMIDOL 75 ML: 755 INJECTION, SOLUTION INTRAVENOUS at 20:03

## 2024-06-12 ASSESSMENT — PAIN - FUNCTIONAL ASSESSMENT
PAIN_FUNCTIONAL_ASSESSMENT: NONE - DENIES PAIN
PAIN_FUNCTIONAL_ASSESSMENT: NONE - DENIES PAIN
PAIN_FUNCTIONAL_ASSESSMENT: 0-10

## 2024-06-12 ASSESSMENT — PAIN DESCRIPTION - PAIN TYPE: TYPE: ACUTE PAIN

## 2024-06-12 ASSESSMENT — PAIN DESCRIPTION - DESCRIPTORS: DESCRIPTORS: TIGHTNESS

## 2024-06-12 ASSESSMENT — PAIN DESCRIPTION - ORIENTATION: ORIENTATION: ANTERIOR

## 2024-06-12 ASSESSMENT — PAIN SCALES - GENERAL: PAINLEVEL_OUTOF10: 5

## 2024-06-12 ASSESSMENT — PAIN DESCRIPTION - LOCATION: LOCATION: CHEST

## 2024-06-12 NOTE — ED PROVIDER NOTES
[facility] EMERGENCY DEPARTMENT  EMERGENCY DEPARTMENT ENCOUNTER      Pt Name: Dot Smith  MRN: [id number]  Birthdate 1955  Date of evaluation: 6/12/2024  Provider: ALANA Diaz - CNP  8:42 AM    CHIEF COMPLAINT       Chief Complaint   Patient presents with    Palpitations     Palpitations, dizziness, shakes, chest tightness.  Onset last night.           HISTORY OF PRESENT ILLNESS    Dot Smith is a 69 y.o. female who presents to the emergency department for evaluation of palpitations, dizziness shakes shortness of breath.  Patient states that last night after eating some chips she started to get some palpitations.  Today, she went to the doctor's office and was paying a bill, she let them know that she was having some palpitations and they told her to go to the ER.  She became short of breath and shaky.  No chest pain no abdominal pain.  She reports that the fluttering seems to happen off and on she cannot really reproduce it make it come or go.  No history of DVT PE or MI.  Has been moderately sedentary due to recent knee surgery.  No known thyroid issues.  She is a little bit concerned that she may have some bacteremia due to recent UTI, was found to have strep bacteria.  She was told that this was very rare    HPI    Nursing Notes were reviewed.    REVIEW OF SYSTEMS       Review of Systems   All other systems reviewed and are negative.      Except as noted above the remainder of the review of systems was reviewed and negative.       PAST MEDICAL HISTORY     Past Medical History:   Diagnosis Date    Arthritis     lower back and knee    Elevated cholesterol     on rx per pcp    Esophageal stricture     GERD (gastroesophageal reflux disease)     otc prilosec    Posterior vaginal wall prolapse     Presence of pessary     Snores     denies apnea    Vaginal enterocele     Wears prescription eyeglasses     Wellness examination     Gail WARNER  last seen 8/19/2020  within normal range or not returned as of this dictation.    EMERGENCY DEPARTMENT COURSE and DIFFERENTIAL DIAGNOSIS/MDM:   Vitals:    Vitals:    06/12/24 1616 06/12/24 1826 06/12/24 1951 06/12/24 2143   BP: (!) 154/98 (!) 146/74 (!) 144/86 (!) 146/82   Pulse: 78   68   Resp: 18   16   Temp: 97.9 °F (36.6 °C)      TempSrc: Oral      SpO2: 96% 98% 95% 97%   Weight: 78 kg (171 lb 15.3 oz)      Height: 1.7 m (5' 6.93\")              Medical Decision Making  Amount and/or Complexity of Data Reviewed  Labs: ordered.  Radiology: ordered.  ECG/medicine tests: ordered.    Risk  Prescription drug management.      Patient presents for evaluation of palpitations shortness of breath.  This happened after a visit to her family doctor and they told her to go to the ER.  I do feel that there is little anxiety component to the shortness of breath.  She states the palpitations started last night after eating chips.  She is concerned that there may be some lingering infection from her previous UTI.    Labs are drawn including a dimer.  She does have a distant history of surgery in January on her knee.  She had Dopplers performed on her legs and they were negative for any blood clots however she does admit she is sedentary.    Labs are unremarkable save for mildly elevated dimer we will pursue a CT of the chest to rule out pulmonary embolism and aneurysm.    Urinalysis does show infection.  After long discussion with the patient she has realized that her urologist took her off her daily control antibiotic and she has started to notice an increase in her UTIs.  She also changes her own pessary and feels this may be to blame      Patient is signed out to my attending physician Dr. Palencia pending final CT results, final disposition and treatment plan.  She is given Rocephin in the department and will be started on Keflex.  They understand the need to possibly change antibiotics if the culture comes back showing resistance to the

## 2024-06-12 NOTE — TELEPHONE ENCOUNTER
Patient came in office to pay bill stated she had an episode last night chest fluttering with SOB. Writer advising go to ED to be evaluated. Patient did state she is not currently experiencing the chest fluttering but is still SOB. Patient stated she will go to [facility] ED now.

## 2024-06-12 NOTE — TELEPHONE ENCOUNTER
Patient came back in office stated she was shaky and her daughter is coming to pick her up from our office and take her. Patient asked if her car would be okay in the parking lot. Writer advised car is okay in our parking lot

## 2024-06-13 LAB — T4 FREE SERPL-MCNC: 1.3 NG/DL (ref 0.92–1.68)

## 2024-06-13 NOTE — ED PROVIDER NOTES
Attending Supervisory Note/Shared Visit   I have personally performed a face to face diagnostic evaluation on this patient. I have reviewed the mid-level’s findings and agree.      CT scan of the chest does not show pulmonary embolism, infiltrate, effusion or pneumothorax.  Patient is treated for UTI with a prescription for Keflex at home and is to follow-up with her PCP.  She may return for worsening symptoms.        Summation      Patient Course: Discharged    ED Medications administered this visit:    Medications   sodium chloride 0.9 % bolus 80 mL (80 mLs IntraVENous Bolus from Bag 6/12/24 2003)   sodium chloride flush 0.9 % injection 10 mL (10 mLs IntraVENous Given 6/12/24 2003)   sodium chloride 0.9 % bolus 1,000 mL (0 mLs IntraVENous Stopped 6/12/24 2310)   iopamidol (ISOVUE-370) 76 % injection 75 mL (75 mLs IntraVENous Given 6/12/24 2003)   cefTRIAXone (ROCEPHIN) 1,000 mg in sodium chloride 0.9 % 50 mL IVPB (mini-bag) (0 mg IntraVENous Stopped 6/12/24 2310)       New Prescriptions from this visit:    New Prescriptions    CEPHALEXIN (KEFLEX) 500 MG CAPSULE    Take 1 capsule by mouth 2 times daily for 10 days       Follow-up:  Yovany Fox MD  [address]  [address]  [facility] [address]  [phone]    In 2 days      Christal Figueroa APRN - CNP  [address] [facility] [place] [address]  [phone]    In 2 days      [facility]  [address]  [place] [id number] [phone]    If symptoms worsen        Final Impression:   1. Urinary tract infection with hematuria, site unspecified    2. Palpitations               (Please note that portions of this note were completed with a voice recognition program.  Efforts were made to edit the dictations but occasionally words are mis-transcribed.)    (Please note that portions of this note were completed with a voice recognition program.  Efforts were made to edit the dictations but occasionally

## 2024-06-13 NOTE — ED NOTES
Pt discharged in stable condition with prescriptions and dc instructions. Pt ambulates to door with steady gait and without assistance.

## 2024-06-13 NOTE — DISCHARGE INSTRUCTIONS
Follow-up with your urologist.  Please discuss with him the need to get back on your methalamine to prevent urinary tract infections.  Also discussed possibility of having pessary changed by the office.

## 2024-06-13 NOTE — TELEPHONE ENCOUNTER
Patient was diagnosed with a UTI. Patient prescribed Cephalexin 500 mg BID.  Patient states symptoms have improved some. ED note in patients chart

## 2024-06-14 LAB
EKG ATRIAL RATE: 74 BPM
EKG P AXIS: 31 DEGREES
EKG P-R INTERVAL: 152 MS
EKG Q-T INTERVAL: 396 MS
EKG QRS DURATION: 76 MS
EKG QTC CALCULATION (BAZETT): 439 MS
EKG R AXIS: -12 DEGREES
EKG T AXIS: 30 DEGREES
EKG VENTRICULAR RATE: 74 BPM
MICROORGANISM SPEC CULT: ABNORMAL
SPECIMEN DESCRIPTION: ABNORMAL

## 2024-06-16 LAB
MICROORGANISM SPEC CULT: NORMAL
MICROORGANISM SPEC CULT: NORMAL
SERVICE CMNT-IMP: NORMAL
SERVICE CMNT-IMP: NORMAL
SPECIMEN DESCRIPTION: NORMAL
SPECIMEN DESCRIPTION: NORMAL

## 2024-06-17 ENCOUNTER — TELEPHONE (OUTPATIENT)
Dept: OBGYN CLINIC | Age: 69
End: 2024-06-17

## 2024-06-17 NOTE — TELEPHONE ENCOUNTER
Patient is calling in stating she went to the ER and has a UTI. Patient stated her urologist wanted her to follow up with us for her pessary. She has not been in, in over two years. States she has been taking her pessary out herself and cleaning it every three months. States she does not know if she is doing it right because she keeps getting reoccurring UTI's.

## 2024-06-17 NOTE — TELEPHONE ENCOUNTER
Patient low she is already on Estrogen cream from her urologist. She is asking if you still want her to come in yearly or at all for an appointment for her pessary.

## 2024-06-18 NOTE — TELEPHONE ENCOUNTER
Spoke with patient regarding recommendation. Patient states she is having no issues with her pessary and is working.

## 2024-07-31 ENCOUNTER — OFFICE VISIT (OUTPATIENT)
Dept: PRIMARY CARE CLINIC | Age: 69
End: 2024-07-31
Payer: MEDICARE

## 2024-07-31 ENCOUNTER — HOSPITAL ENCOUNTER (OUTPATIENT)
Age: 69
Setting detail: SPECIMEN
Discharge: HOME | End: 2024-07-31

## 2024-07-31 VITALS
DIASTOLIC BLOOD PRESSURE: 84 MMHG | TEMPERATURE: 99.1 F | OXYGEN SATURATION: 96 % | HEART RATE: 99 BPM | WEIGHT: 178 LBS | SYSTOLIC BLOOD PRESSURE: 112 MMHG | BODY MASS INDEX: 27.94 KG/M2

## 2024-07-31 DIAGNOSIS — U07.1 COVID-19: Primary | ICD-10-CM

## 2024-07-31 DIAGNOSIS — N30.01 ACUTE CYSTITIS WITH HEMATURIA: ICD-10-CM

## 2024-07-31 DIAGNOSIS — R30.0 DYSURIA: ICD-10-CM

## 2024-07-31 LAB
BILIRUBIN, POC: NEGATIVE
BLOOD URINE, POC: NORMAL
CLARITY, POC: NORMAL
COLOR, POC: YELLOW
GLUCOSE URINE, POC: NEGATIVE
INFLUENZA A ANTIGEN, POC: NEGATIVE
INFLUENZA B ANTIGEN, POC: NEGATIVE
KETONES, POC: NEGATIVE
LEUKOCYTE EST, POC: NORMAL
LOT EXPIRE DATE: ABNORMAL
LOT KIT NUMBER: ABNORMAL
NITRITE, POC: POSITIVE
PH, POC: 6
PROTEIN, POC: NORMAL
SARS-COV-2, POC: DETECTED
SPECIFIC GRAVITY, POC: 1.02
UROBILINOGEN, POC: 0.2
VALID INTERNAL CONTROL: YES
VENDOR AND KIT NAME POC: ABNORMAL

## 2024-07-31 PROCEDURE — G8417 CALC BMI ABV UP PARAM F/U: HCPCS | Performed by: NURSE PRACTITIONER

## 2024-07-31 PROCEDURE — G8427 DOCREV CUR MEDS BY ELIG CLIN: HCPCS | Performed by: NURSE PRACTITIONER

## 2024-07-31 PROCEDURE — 1036F TOBACCO NON-USER: CPT | Performed by: NURSE PRACTITIONER

## 2024-07-31 PROCEDURE — 1090F PRES/ABSN URINE INCON ASSESS: CPT | Performed by: NURSE PRACTITIONER

## 2024-07-31 PROCEDURE — 81002 URINALYSIS NONAUTO W/O SCOPE: CPT | Performed by: NURSE PRACTITIONER

## 2024-07-31 PROCEDURE — 1123F ACP DISCUSS/DSCN MKR DOCD: CPT | Performed by: NURSE PRACTITIONER

## 2024-07-31 PROCEDURE — 3017F COLORECTAL CA SCREEN DOC REV: CPT | Performed by: NURSE PRACTITIONER

## 2024-07-31 PROCEDURE — 99213 OFFICE O/P EST LOW 20 MIN: CPT | Performed by: NURSE PRACTITIONER

## 2024-07-31 PROCEDURE — G8400 PT W/DXA NO RESULTS DOC: HCPCS | Performed by: NURSE PRACTITIONER

## 2024-07-31 PROCEDURE — 87428 SARSCOV & INF VIR A&B AG IA: CPT | Performed by: NURSE PRACTITIONER

## 2024-07-31 RX ORDER — NITROFURANTOIN 25; 75 MG/1; MG/1
100 CAPSULE ORAL 2 TIMES DAILY
Qty: 14 CAPSULE | Refills: 0 | Status: SHIPPED | OUTPATIENT
Start: 2024-07-31 | End: 2024-08-07

## 2024-07-31 ASSESSMENT — ENCOUNTER SYMPTOMS
SORE THROAT: 1
CHEST TIGHTNESS: 0
DIARRHEA: 0
SHORTNESS OF BREATH: 1
ABDOMINAL PAIN: 1
CHANGE IN BOWEL HABIT: 0
VOMITING: 0
TROUBLE SWALLOWING: 0
EYE ITCHING: 0
NAUSEA: 1
RHINORRHEA: 1
COUGH: 1
EYE PAIN: 0
WHEEZING: 0
CONSTIPATION: 0
EYE REDNESS: 0
SINUS PRESSURE: 1

## 2024-07-31 NOTE — PROGRESS NOTES
[facility]ALK [facility]  [address] [place], [place] [id number]  Dept: [phone]    Dot Simth is a 69 y.o. female Established patient, who presents to the walk-in clinic today with conditions/complaints as noted below:    Chief Complaint   Patient presents with    Chills    Pharyngitis    Generalized Body Aches         HPI:     Patient presents to walk in clinic with concerns about acute illness. Started with sore throat, chills, runny nose, congestion, cough and body aches yesterday. Sister had similar symptoms last week. Also reports urinary frequency, urgency and burning that started last night.     Pharyngitis  This is a new problem. The current episode started yesterday. Associated symptoms include abdominal pain, chills, congestion, coughing, fatigue, headaches, myalgias, nausea, a sore throat, urinary symptoms and weakness. Pertinent negatives include no change in bowel habit, chest pain, fever, rash or vomiting. Treatments tried: Claritin. The treatment provided no relief.   Urinary Frequency   This is a new problem. The current episode started yesterday. There has been no fever. Associated symptoms include chills, flank pain (right), frequency, nausea and urgency. Pertinent negatives include no hematuria or vomiting.       Past Medical History:   Diagnosis Date    Arthritis     lower back and knee    Elevated cholesterol     on rx per pcp    Esophageal stricture     GERD (gastroesophageal reflux disease)     otc prilosec    Posterior vaginal wall prolapse     Presence of pessary     Snores     denies apnea    Vaginal enterocele     Wears prescription eyeglasses     Wellness examination     Gail WARNER  last seen 8/19/2020       Current Outpatient Medications   Medication Sig Dispense Refill    nitrofurantoin, macrocrystal-monohydrate, (MACROBID) 100 MG capsule Take 1 capsule by mouth 2 times daily for 7 days 14 capsule 0

## 2024-08-02 LAB
MICROORGANISM SPEC CULT: ABNORMAL
SERVICE CMNT-IMP: ABNORMAL
SPECIMEN DESCRIPTION: ABNORMAL

## 2024-08-20 ENCOUNTER — OFFICE VISIT (OUTPATIENT)
Dept: FAMILY MEDICINE CLINIC | Age: 69
End: 2024-08-20
Payer: MEDICARE

## 2024-08-20 VITALS
HEIGHT: 67 IN | DIASTOLIC BLOOD PRESSURE: 80 MMHG | SYSTOLIC BLOOD PRESSURE: 128 MMHG | HEART RATE: 68 BPM | OXYGEN SATURATION: 98 % | WEIGHT: 176 LBS | TEMPERATURE: 97.5 F | BODY MASS INDEX: 27.62 KG/M2

## 2024-08-20 DIAGNOSIS — Z00.00 ENCOUNTER FOR MEDICAL EXAMINATION TO ESTABLISH CARE: Primary | ICD-10-CM

## 2024-08-20 DIAGNOSIS — Z13.0 SCREENING FOR DEFICIENCY ANEMIA: ICD-10-CM

## 2024-08-20 DIAGNOSIS — R74.8 ELEVATED LIVER ENZYMES: ICD-10-CM

## 2024-08-20 DIAGNOSIS — Z13.1 SCREENING FOR DIABETES MELLITUS: ICD-10-CM

## 2024-08-20 DIAGNOSIS — E78.5 HYPERLIPIDEMIA, UNSPECIFIED HYPERLIPIDEMIA TYPE: ICD-10-CM

## 2024-08-20 PROCEDURE — 99214 OFFICE O/P EST MOD 30 MIN: CPT | Performed by: REGISTERED NURSE

## 2024-08-20 PROCEDURE — 1123F ACP DISCUSS/DSCN MKR DOCD: CPT | Performed by: REGISTERED NURSE

## 2024-08-20 RX ORDER — ATORVASTATIN CALCIUM 10 MG/1
10 TABLET, FILM COATED ORAL DAILY
Qty: 30 TABLET | Refills: 2 | Status: SHIPPED | OUTPATIENT
Start: 2024-08-20

## 2024-08-20 SDOH — ECONOMIC STABILITY: FOOD INSECURITY: WITHIN THE PAST 12 MONTHS, THE FOOD YOU BOUGHT JUST DIDN'T LAST AND YOU DIDN'T HAVE MONEY TO GET MORE.: NEVER TRUE

## 2024-08-20 SDOH — ECONOMIC STABILITY: INCOME INSECURITY: HOW HARD IS IT FOR YOU TO PAY FOR THE VERY BASICS LIKE FOOD, HOUSING, MEDICAL CARE, AND HEATING?: NOT HARD AT ALL

## 2024-08-20 SDOH — ECONOMIC STABILITY: FOOD INSECURITY: WITHIN THE PAST 12 MONTHS, YOU WORRIED THAT YOUR FOOD WOULD RUN OUT BEFORE YOU GOT MONEY TO BUY MORE.: NEVER TRUE

## 2024-08-20 ASSESSMENT — ANXIETY QUESTIONNAIRES
IF YOU CHECKED OFF ANY PROBLEMS ON THIS QUESTIONNAIRE, HOW DIFFICULT HAVE THESE PROBLEMS MADE IT FOR YOU TO DO YOUR WORK, TAKE CARE OF THINGS AT HOME, OR GET ALONG WITH OTHER PEOPLE: NOT DIFFICULT AT ALL
1. FEELING NERVOUS, ANXIOUS, OR ON EDGE: NOT AT ALL
4. TROUBLE RELAXING: NOT AT ALL
2. NOT BEING ABLE TO STOP OR CONTROL WORRYING: NOT AT ALL
3. WORRYING TOO MUCH ABOUT DIFFERENT THINGS: NOT AT ALL
6. BECOMING EASILY ANNOYED OR IRRITABLE: NOT AT ALL
7. FEELING AFRAID AS IF SOMETHING AWFUL MIGHT HAPPEN: NOT AT ALL
5. BEING SO RESTLESS THAT IT IS HARD TO SIT STILL: NOT AT ALL
GAD7 TOTAL SCORE: 0

## 2024-08-20 ASSESSMENT — ENCOUNTER SYMPTOMS
SINUS PRESSURE: 0
ABDOMINAL PAIN: 0
SORE THROAT: 0
NAUSEA: 0
DIARRHEA: 0
SHORTNESS OF BREATH: 0
CONSTIPATION: 0
VOMITING: 0
COUGH: 0

## 2024-08-20 ASSESSMENT — PATIENT HEALTH QUESTIONNAIRE - PHQ9
1. LITTLE INTEREST OR PLEASURE IN DOING THINGS: NOT AT ALL
SUM OF ALL RESPONSES TO PHQ9 QUESTIONS 1 & 2: 1
2. FEELING DOWN, DEPRESSED OR HOPELESS: SEVERAL DAYS
SUM OF ALL RESPONSES TO PHQ QUESTIONS 1-9: 1

## 2024-08-20 NOTE — PROGRESS NOTES
[facility]  [facility] [address] [facility] [place] [place] [address]  Dept: [phone]    CHIEF COMPLAINT:      Chief Complaint   Patient presents with    Follow-up Chronic Condition    Established New Doctor       ASSESSMENT & PLAN      1. Encounter for medical examination to establish care  2. Hyperlipidemia, unspecified hyperlipidemia type  -     atorvastatin (LIPITOR) 10 MG tablet; Take 1 tablet by mouth daily, Disp-30 tablet, R-2Normal  -     Lipid, Fasting; Future  3. Elevated liver enzymes  -     Comprehensive Metabolic Panel, Fasting; Future  4. Screening for diabetes mellitus  -     Hemoglobin A1C; Future  5. Screening for deficiency anemia  -     CBC; Future     Return in about 6 months (around 2/20/2025) for medicare well visit.         HPI       Dot Smith is a 69 y.o. female who presents to [facility] previously seen by Christal Barnett. Past medical history is reviewed and noted below. 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 She denies tobacco, alcohol and recreational drug use.     Urology: she see's Dr. Fox infrequently. She states she does not know when her next appointment is. She is on Estradiol 0.1 mg/gm per vagina twice weekly and D-mannose once daily for her chronic UTI. She has history of bladder sling. She most recently had a UTI last month. She states she does not have control of her bladder she has frequent accidents.     OB: Gail Lala did her rectocele surgery. And placement of a  vaginal pessary. She states this is to help with the fact the top of her vagina is falling.     Omeprazole: has acid reflux she states she was told after having an EGD that she needed the medications. She does have history of esophageal dilation.     Dr. Barraza: had a left knee scope. Has completed her treatment with him. She states the knee is bone on bone and she needs a replacement, but she wants to wait as long as possible.     ESTABLISHING CARE

## 2024-11-12 DIAGNOSIS — E78.5 HYPERLIPIDEMIA, UNSPECIFIED HYPERLIPIDEMIA TYPE: ICD-10-CM

## 2024-11-13 RX ORDER — ATORVASTATIN CALCIUM 10 MG/1
10 TABLET, FILM COATED ORAL DAILY
Qty: 30 TABLET | Refills: 5 | Status: SHIPPED | OUTPATIENT
Start: 2024-11-13

## 2024-11-13 NOTE — TELEPHONE ENCOUNTER
LOV: 8/20/2024    RTO:   Future Appointments   Date Time Provider Department Center   2/20/2025  3:00 PM Darshan Zamora APRN - CNP [facility] E[facility] DEP     LRF: 8/20/2024          Controlled Substance Monitoring:    Acute and Chronic Pain Monitoring:        No data to display

## 2024-11-18 ENCOUNTER — OFFICE VISIT (OUTPATIENT)
Dept: FAMILY MEDICINE CLINIC | Age: 69
End: 2024-11-18

## 2024-11-18 ENCOUNTER — TELEPHONE (OUTPATIENT)
Dept: GASTROENTEROLOGY | Age: 69
End: 2024-11-18

## 2024-11-18 ENCOUNTER — HOSPITAL ENCOUNTER (OUTPATIENT)
Age: 69
Setting detail: SPECIMEN
Discharge: HOME | End: 2024-11-18

## 2024-11-18 VITALS
HEART RATE: 73 BPM | HEIGHT: 67 IN | SYSTOLIC BLOOD PRESSURE: 122 MMHG | TEMPERATURE: 97.5 F | WEIGHT: 178 LBS | DIASTOLIC BLOOD PRESSURE: 82 MMHG | BODY MASS INDEX: 27.94 KG/M2 | OXYGEN SATURATION: 95 %

## 2024-11-18 DIAGNOSIS — Z12.11 SCREENING FOR COLON CANCER: ICD-10-CM

## 2024-11-18 DIAGNOSIS — R15.9 INCONTINENCE OF FECES, UNSPECIFIED FECAL INCONTINENCE TYPE: ICD-10-CM

## 2024-11-18 DIAGNOSIS — R31.0 GROSS HEMATURIA: ICD-10-CM

## 2024-11-18 DIAGNOSIS — N30.01 ACUTE CYSTITIS WITH HEMATURIA: ICD-10-CM

## 2024-11-18 DIAGNOSIS — R10.31 RIGHT LOWER QUADRANT ABDOMINAL PAIN: Primary | ICD-10-CM

## 2024-11-18 DIAGNOSIS — R10.31 RIGHT LOWER QUADRANT ABDOMINAL PAIN: ICD-10-CM

## 2024-11-18 LAB
ALBUMIN SERPL-MCNC: 4.4 G/DL (ref 3.5–5.2)
ALBUMIN/GLOB SERPL: 1.6 {RATIO} (ref 1–2.5)
ALP SERPL-CCNC: 170 U/L (ref 35–104)
ALT SERPL-CCNC: 41 U/L (ref 10–35)
AMYLASE SERPL-CCNC: 50 U/L (ref 28–100)
ANION GAP SERPL CALCULATED.3IONS-SCNC: 12 MMOL/L (ref 9–16)
AST SERPL-CCNC: 35 U/L (ref 10–35)
BILIRUB SERPL-MCNC: 0.5 MG/DL (ref 0–1.2)
BILIRUBIN, POC: NEGATIVE
BLOOD URINE, POC: ABNORMAL
BUN SERPL-MCNC: 15 MG/DL (ref 8–23)
CALCIUM SERPL-MCNC: 9.5 MG/DL (ref 8.6–10.4)
CHLORIDE SERPL-SCNC: 103 MMOL/L (ref 98–107)
CLARITY, POC: ABNORMAL
CO2 SERPL-SCNC: 26 MMOL/L (ref 20–31)
COLOR, POC: YELLOW
CREAT SERPL-MCNC: 0.7 MG/DL (ref 0.6–0.9)
CRP SERPL HS-MCNC: <3 MG/L (ref 0–5)
ERYTHROCYTE [DISTWIDTH] IN BLOOD BY AUTOMATED COUNT: 13.6 % (ref 11.8–14.4)
GFR, ESTIMATED: >90 ML/MIN/1.73M2
GLUCOSE SERPL-MCNC: 65 MG/DL (ref 74–99)
GLUCOSE URINE, POC: NEGATIVE MG/DL
HCT VFR BLD AUTO: 44.7 % (ref 36.3–47.1)
HGB BLD-MCNC: 14 G/DL (ref 11.9–15.1)
KETONES, POC: NEGATIVE MG/DL
LEUKOCYTE EST, POC: ABNORMAL
MCH RBC QN AUTO: 28.5 PG (ref 25.2–33.5)
MCHC RBC AUTO-ENTMCNC: 31.3 G/DL (ref 28.4–34.8)
MCV RBC AUTO: 91 FL (ref 82.6–102.9)
NITRITE, POC: NEGATIVE
NRBC BLD-RTO: 0 PER 100 WBC
PH, POC: 5.5
PLATELET # BLD AUTO: 192 K/UL (ref 138–453)
PMV BLD AUTO: 12.8 FL (ref 8.1–13.5)
POTASSIUM SERPL-SCNC: 4.1 MMOL/L (ref 3.7–5.3)
PROT SERPL-MCNC: 7.2 G/DL (ref 6.6–8.7)
PROTEIN, POC: 100 MG/DL
RBC # BLD AUTO: 4.91 M/UL (ref 3.95–5.11)
SODIUM SERPL-SCNC: 141 MMOL/L (ref 136–145)
SPECIFIC GRAVITY, POC: 1.02
UROBILINOGEN, POC: 0.2 MG/DL
WBC OTHER # BLD: 9 K/UL (ref 3.5–11.3)

## 2024-11-18 RX ORDER — NITROFURANTOIN 25; 75 MG/1; MG/1
100 CAPSULE ORAL 2 TIMES DAILY
Qty: 14 CAPSULE | Refills: 0 | Status: SHIPPED | OUTPATIENT
Start: 2024-11-18 | End: 2024-11-25

## 2024-11-18 ASSESSMENT — ENCOUNTER SYMPTOMS
RESPIRATORY NEGATIVE: 1
ABDOMINAL PAIN: 1
COUGH: 0
BACK PAIN: 1
SHORTNESS OF BREATH: 0

## 2024-11-18 NOTE — PROGRESS NOTES
2024   Component Date Value Ref Range Status    Specimen Description 2024 .CLEAN CATCH URINE   Final    Special Requests 2024 Site: Urine   Final    Culture 2024 ESCHERICHIA COLI >100,000 CFU/ML (A)   Final   Office Visit on 2024   Component Date Value Ref Range Status    Color, UA 2024 yellow   Final    Clarity, UA 2024 cloudy   Final    Glucose, UA POC 2024 negative   Final    Bilirubin, UA 2024 negative   Final    Ketones, UA 2024 negative   Final    Spec Grav, UA 2024 1.020   Final    Blood, UA POC 2024 moderate   Final    pH, UA 2024 6.0   Final    Protein, UA POC 2024 30 mg   Final    Urobilinogen, UA 2024 0.2   Final    Leukocytes, UA 2024 small   Final    Nitrite, UA 2024 positive   Final    Internal Control 2024 yes   Final    Lot/Kit Number 2024 [id number]   Final    Lot/Kit  date: 2024 2-15-25   Final    SARS-COV-2, POC 2024 Detected (A)  Not Detected Final    Influenza A Antigen, POC 2024 Negative   Final    Influenza B Antigen, POC 2024 Negative   Final    Vendor and kit name 2024 Veritor   Final   Admission on 2024, Discharged on 2024   Component Date Value Ref Range Status    WBC 2024 7.0  3.5 - 11.0 k/uL Final    RBC 2024 4.46  4.0 - 5.2 m/uL Final    Hemoglobin 2024 12.7  12.0 - 16.0 g/dL Final    Hematocrit 2024 38.9  36 - 46 % Final    MCV 2024 87.3  80 - 100 fL Final    MCH 2024 28.5  26 - 34 pg Final    MCHC 2024 32.6  31 - 37 g/dL Final    RDW 2024 13.1  12.5 - 15.4 % Final    Platelets 2024 171  140 - 450 k/uL Final    MPV 2024 10.5  6.0 - 12.0 fL Final    Neutrophils % 2024 55  36 - 66 % Final    Lymphocytes % 2024 34  24 - 44 % Final    Monocytes % 2024 9  2 - 11 % Final    Eosinophils % 2024 1  1 - 4 % Final    Basophils % 2024 1  0 - 2 % Final

## 2024-11-18 NOTE — TELEPHONE ENCOUNTER
Patient left a voicemail to schedule colonoscopy from referral that was placed today.    Please advise.

## 2024-11-19 ENCOUNTER — HOSPITAL ENCOUNTER (OUTPATIENT)
Dept: CT IMAGING | Age: 69
Discharge: HOME | End: 2024-11-21
Payer: MEDICARE

## 2024-11-19 DIAGNOSIS — N30.01 ACUTE CYSTITIS WITH HEMATURIA: ICD-10-CM

## 2024-11-19 DIAGNOSIS — R15.9 INCONTINENCE OF FECES, UNSPECIFIED FECAL INCONTINENCE TYPE: ICD-10-CM

## 2024-11-19 DIAGNOSIS — R31.0 GROSS HEMATURIA: ICD-10-CM

## 2024-11-19 DIAGNOSIS — R10.31 RIGHT LOWER QUADRANT ABDOMINAL PAIN: ICD-10-CM

## 2024-11-19 LAB
MICROORGANISM SPEC CULT: NORMAL
SERVICE CMNT-IMP: NORMAL
SPECIMEN DESCRIPTION: NORMAL

## 2024-11-19 PROCEDURE — 74176 CT ABD & PELVIS W/O CONTRAST: CPT

## 2024-11-19 NOTE — TELEPHONE ENCOUNTER
Dot is calling back.  She thought her colonoscopy was already scheduled.  I told her nothing is scheduled yet.  There is a referral in the system.  Please contact her anytime at [phone].

## 2024-11-19 NOTE — TELEPHONE ENCOUNTER
NP/Screening for colon cancer/Incontinence of feces, unspecified fecal incontinence type/Medicare Sch 11/25 for an office visit

## 2024-11-25 ENCOUNTER — PREP FOR PROCEDURE (OUTPATIENT)
Dept: GASTROENTEROLOGY | Age: 69
End: 2024-11-25

## 2024-11-25 ENCOUNTER — OFFICE VISIT (OUTPATIENT)
Dept: GASTROENTEROLOGY | Age: 69
End: 2024-11-25
Payer: MEDICARE

## 2024-11-25 VITALS
BODY MASS INDEX: 28.12 KG/M2 | WEIGHT: 179 LBS | SYSTOLIC BLOOD PRESSURE: 130 MMHG | TEMPERATURE: 98.2 F | DIASTOLIC BLOOD PRESSURE: 83 MMHG

## 2024-11-25 DIAGNOSIS — R10.11 ABDOMINAL PAIN, RIGHT UPPER QUADRANT: ICD-10-CM

## 2024-11-25 DIAGNOSIS — R15.9 FULL INCONTINENCE OF FECES: ICD-10-CM

## 2024-11-25 DIAGNOSIS — Z98.890 HISTORY OF RECONSTRUCTIVE REPAIR OF RECTOCELE: ICD-10-CM

## 2024-11-25 DIAGNOSIS — K21.9 GASTROESOPHAGEAL REFLUX DISEASE WITHOUT ESOPHAGITIS: ICD-10-CM

## 2024-11-25 DIAGNOSIS — K58.9 IRRITABLE BOWEL SYNDROME, UNSPECIFIED TYPE: ICD-10-CM

## 2024-11-25 DIAGNOSIS — R13.19 ESOPHAGEAL DYSPHAGIA: ICD-10-CM

## 2024-11-25 DIAGNOSIS — R10.31 ABDOMINAL PAIN, RIGHT LOWER QUADRANT: Primary | ICD-10-CM

## 2024-11-25 PROCEDURE — G8400 PT W/DXA NO RESULTS DOC: HCPCS | Performed by: INTERNAL MEDICINE

## 2024-11-25 PROCEDURE — 99204 OFFICE O/P NEW MOD 45 MIN: CPT | Performed by: INTERNAL MEDICINE

## 2024-11-25 PROCEDURE — 1090F PRES/ABSN URINE INCON ASSESS: CPT | Performed by: INTERNAL MEDICINE

## 2024-11-25 PROCEDURE — 1123F ACP DISCUSS/DSCN MKR DOCD: CPT | Performed by: INTERNAL MEDICINE

## 2024-11-25 PROCEDURE — 3017F COLORECTAL CA SCREEN DOC REV: CPT | Performed by: INTERNAL MEDICINE

## 2024-11-25 PROCEDURE — 1159F MED LIST DOCD IN RCRD: CPT | Performed by: INTERNAL MEDICINE

## 2024-11-25 PROCEDURE — G8482 FLU IMMUNIZE ORDER/ADMIN: HCPCS | Performed by: INTERNAL MEDICINE

## 2024-11-25 PROCEDURE — G8427 DOCREV CUR MEDS BY ELIG CLIN: HCPCS | Performed by: INTERNAL MEDICINE

## 2024-11-25 PROCEDURE — G8417 CALC BMI ABV UP PARAM F/U: HCPCS | Performed by: INTERNAL MEDICINE

## 2024-11-25 PROCEDURE — 1036F TOBACCO NON-USER: CPT | Performed by: INTERNAL MEDICINE

## 2024-11-25 ASSESSMENT — ENCOUNTER SYMPTOMS
CONSTIPATION: 0
BLOOD IN STOOL: 0
VOICE CHANGE: 0
DIARRHEA: 1
COUGH: 0
SORE THROAT: 0
RECTAL PAIN: 0
VOMITING: 0
ANAL BLEEDING: 0
CHOKING: 0
ABDOMINAL DISTENTION: 0
NAUSEA: 0
TROUBLE SWALLOWING: 1
ABDOMINAL PAIN: 1
SHORTNESS OF BREATH: 0
WHEEZING: 0

## 2024-11-25 NOTE — PROGRESS NOTES
GI NEW PATIENT OFFICE VISIT    INTERVAL HISTORY:   Darshan Zamora, ALANA - CNP  [address] [place] [id number]    Chief Complaint   Patient presents with    Colonoscopy     Patient is here today as a NP to discuss screening for colonoscopy, incontinence of feces, & RLQ abdominal pain.       1. Abdominal pain, right lower quadrant    2. History of reconstructive repair of rectocele    3. Irritable bowel syndrome, unspecified type    4. Full incontinence of feces    5. Esophageal dysphagia    6. Gastroesophageal reflux disease without esophagitis        This patient is seen in my office for the first time  She is a very pleasant 69-year-old female she is retired for almost 1 year  She gives history for rectocele varicocele and had surgeries done in the past  She gives history of nonspecific right lower quadrant abdominal pain  Sometimes this pain shoots at the back  Patient has been complaining of some abdominal pains, off and on cramping  Also complains of abdominal bloating and gas  Has off and on nausea without any sig vomiting  Has some alternating constipation and diarrhea  Has no weight loss  Has some anxiety issues  She has not had a colonoscopy done in a while  Denies any overt bleeding melena  She also gives history for GERD and dysphagia  She said that she had esophageal dilations done in the past  Dysphagia is mainly to the solid food  No history for smoking alcohol abuse illicit drug usage  No known family history for colon cancer  Available records reviewed with    HISTORY OF PRESENT ILLNESS: Ms.Vickie CATIE Smith is a 69 y.o. female with a past history remarkable for , referred for evaluation of   Chief Complaint   Patient presents with    Colonoscopy     Patient is here today as a NP to discuss screening for colonoscopy, incontinence of feces, & RLQ abdominal pain.   .    Past Medical,Family, and Social History reviewed and does contribute to the patient presenting condition.    Patient's  Shower only

## 2024-11-26 DIAGNOSIS — Z12.11 COLON CANCER SCREENING: Primary | ICD-10-CM

## 2024-11-26 NOTE — TELEPHONE ENCOUNTER
Procedure scheduled/Dr AMRYAM Nava  Procedure: colon egd   Dx: RUQ abdominal pain / screen for colon cancer / incontinence of feces  Date: 5-6-25  Time: 8:45 a.m.  Hospital: [facility]   Bowel Prep instructions given: miralax dulco  In office/via phone: office   Clearance needed: no

## 2024-11-27 ENCOUNTER — TELEPHONE (OUTPATIENT)
Dept: FAMILY MEDICINE CLINIC | Age: 69
End: 2024-11-27

## 2024-11-27 RX ORDER — BISACODYL 5 MG/1
TABLET, DELAYED RELEASE ORAL
Qty: 4 TABLET | Refills: 0 | Status: SHIPPED | OUTPATIENT
Start: 2024-11-27

## 2024-11-27 RX ORDER — POLYETHYLENE GLYCOL 3350 17 G/17G
POWDER, FOR SOLUTION ORAL
Qty: 238 G | Refills: 0 | Status: SHIPPED | OUTPATIENT
Start: 2024-11-27

## 2024-11-27 NOTE — TELEPHONE ENCOUNTER
Patient comes in today stating she had seen Dr. Brandy Win last week.     They have her scheduled out into May for her Endoscopy and Colonoscopy.    She is concerned that it is scheduled so far out.    Write advised patient to call the office back and to be put on Cancellation. List.    She is wondering is there is anything we can do to get her in sooner?    Please advise and route to clinical staff.

## 2024-11-29 ENCOUNTER — TELEPHONE (OUTPATIENT)
Dept: GASTROENTEROLOGY | Age: 69
End: 2024-11-29

## 2024-11-29 NOTE — TELEPHONE ENCOUNTER
Patient stated she has an upcoming  procedure on 12/02/2024 and was returning a call. She can be reached at [phone] . Okay to leave a message.

## 2024-11-29 NOTE — TELEPHONE ENCOUNTER
Patient LVM regarding the location of her procedure.  Returned call and LVM reminding her that it is still at [facility].

## 2024-11-29 NOTE — TELEPHONE ENCOUNTER
Called the patient and offered 12/2/24 [facility] @ 9:15 a.m. and arriving at 7:45 a.m.  The patient was agreeable.  Patient did not have a way for me to update prep sheet and send it to her.  Prep reviewed again in depth.  Writer thanked and call ended.

## 2024-12-02 ENCOUNTER — HOSPITAL ENCOUNTER (OUTPATIENT)
Age: 69
Setting detail: OUTPATIENT SURGERY
Discharge: HOME | End: 2024-12-02
Attending: INTERNAL MEDICINE | Admitting: INTERNAL MEDICINE
Payer: MEDICARE

## 2024-12-02 ENCOUNTER — ANESTHESIA EVENT (OUTPATIENT)
Dept: OPERATING ROOM | Age: 69
End: 2024-12-02
Payer: MEDICARE

## 2024-12-02 ENCOUNTER — ANESTHESIA (OUTPATIENT)
Dept: OPERATING ROOM | Age: 69
End: 2024-12-02
Payer: MEDICARE

## 2024-12-02 VITALS
BODY MASS INDEX: 27.07 KG/M2 | WEIGHT: 172.5 LBS | RESPIRATION RATE: 18 BRPM | DIASTOLIC BLOOD PRESSURE: 78 MMHG | HEART RATE: 66 BPM | OXYGEN SATURATION: 96 % | TEMPERATURE: 97.3 F | HEIGHT: 67 IN | SYSTOLIC BLOOD PRESSURE: 127 MMHG

## 2024-12-02 DIAGNOSIS — R10.11 ABDOMINAL PAIN, RIGHT UPPER QUADRANT: ICD-10-CM

## 2024-12-02 PROBLEM — Z12.12 SCREENING FOR COLORECTAL CANCER: Status: ACTIVE | Noted: 2024-12-02

## 2024-12-02 PROBLEM — Z12.11 SCREENING FOR COLORECTAL CANCER: Status: ACTIVE | Noted: 2024-12-02

## 2024-12-02 PROCEDURE — 3700000001 HC ADD 15 MINUTES (ANESTHESIA): Performed by: INTERNAL MEDICINE

## 2024-12-02 PROCEDURE — 7100000011 HC PHASE II RECOVERY - ADDTL 15 MIN: Performed by: INTERNAL MEDICINE

## 2024-12-02 PROCEDURE — 3609010300 HC COLONOSCOPY W/BIOPSY SINGLE/MULTIPLE: Performed by: INTERNAL MEDICINE

## 2024-12-02 PROCEDURE — 2709999900 HC NON-CHARGEABLE SUPPLY: Performed by: INTERNAL MEDICINE

## 2024-12-02 PROCEDURE — 2580000003 HC RX 258: Performed by: ANESTHESIOLOGY

## 2024-12-02 PROCEDURE — 6360000002 HC RX W HCPCS: Performed by: NURSE ANESTHETIST, CERTIFIED REGISTERED

## 2024-12-02 PROCEDURE — 3609012400 HC EGD TRANSORAL BIOPSY SINGLE/MULTIPLE: Performed by: INTERNAL MEDICINE

## 2024-12-02 PROCEDURE — 88305 TISSUE EXAM BY PATHOLOGIST: CPT

## 2024-12-02 PROCEDURE — 3700000000 HC ANESTHESIA ATTENDED CARE: Performed by: INTERNAL MEDICINE

## 2024-12-02 PROCEDURE — 7100000010 HC PHASE II RECOVERY - FIRST 15 MIN: Performed by: INTERNAL MEDICINE

## 2024-12-02 RX ORDER — LIDOCAINE HYDROCHLORIDE 10 MG/ML
1 INJECTION, SOLUTION EPIDURAL; INFILTRATION; INTRACAUDAL; PERINEURAL
Status: DISCONTINUED | OUTPATIENT
Start: 2024-12-02 | End: 2024-12-02 | Stop reason: HOSPADM

## 2024-12-02 RX ORDER — SODIUM CHLORIDE 9 MG/ML
INJECTION, SOLUTION INTRAVENOUS PRN
Status: DISCONTINUED | OUTPATIENT
Start: 2024-12-02 | End: 2024-12-02 | Stop reason: HOSPADM

## 2024-12-02 RX ORDER — SODIUM CHLORIDE, SODIUM LACTATE, POTASSIUM CHLORIDE, CALCIUM CHLORIDE 600; 310; 30; 20 MG/100ML; MG/100ML; MG/100ML; MG/100ML
INJECTION, SOLUTION INTRAVENOUS CONTINUOUS
Status: DISCONTINUED | OUTPATIENT
Start: 2024-12-02 | End: 2024-12-02 | Stop reason: HOSPADM

## 2024-12-02 RX ORDER — SODIUM CHLORIDE 0.9 % (FLUSH) 0.9 %
5-40 SYRINGE (ML) INJECTION PRN
Status: DISCONTINUED | OUTPATIENT
Start: 2024-12-02 | End: 2024-12-02 | Stop reason: HOSPADM

## 2024-12-02 RX ORDER — SODIUM CHLORIDE 0.9 % (FLUSH) 0.9 %
5-40 SYRINGE (ML) INJECTION EVERY 12 HOURS SCHEDULED
Status: DISCONTINUED | OUTPATIENT
Start: 2024-12-02 | End: 2024-12-02 | Stop reason: HOSPADM

## 2024-12-02 RX ORDER — LIDOCAINE HYDROCHLORIDE 20 MG/ML
INJECTION, SOLUTION EPIDURAL; INFILTRATION; INTRACAUDAL; PERINEURAL
Status: DISCONTINUED | OUTPATIENT
Start: 2024-12-02 | End: 2024-12-02 | Stop reason: SDUPTHER

## 2024-12-02 RX ORDER — PROPOFOL 10 MG/ML
INJECTION, EMULSION INTRAVENOUS
Status: DISCONTINUED | OUTPATIENT
Start: 2024-12-02 | End: 2024-12-02 | Stop reason: SDUPTHER

## 2024-12-02 RX ORDER — SODIUM CHLORIDE 9 MG/ML
INJECTION, SOLUTION INTRAVENOUS CONTINUOUS
Status: DISCONTINUED | OUTPATIENT
Start: 2024-12-02 | End: 2024-12-02 | Stop reason: HOSPADM

## 2024-12-02 RX ADMIN — PROPOFOL 50 MG: 10 INJECTION, EMULSION INTRAVENOUS at 11:26

## 2024-12-02 RX ADMIN — PROPOFOL 50 MG: 10 INJECTION, EMULSION INTRAVENOUS at 11:19

## 2024-12-02 RX ADMIN — PROPOFOL 50 MG: 10 INJECTION, EMULSION INTRAVENOUS at 11:14

## 2024-12-02 RX ADMIN — PROPOFOL 50 MG: 10 INJECTION, EMULSION INTRAVENOUS at 11:09

## 2024-12-02 RX ADMIN — SODIUM CHLORIDE, POTASSIUM CHLORIDE, SODIUM LACTATE AND CALCIUM CHLORIDE: 600; 310; 30; 20 INJECTION, SOLUTION INTRAVENOUS at 09:13

## 2024-12-02 RX ADMIN — PROPOFOL 50 MG: 10 INJECTION, EMULSION INTRAVENOUS at 11:06

## 2024-12-02 RX ADMIN — LIDOCAINE HYDROCHLORIDE 60 MG: 20 INJECTION, SOLUTION EPIDURAL; INFILTRATION; INTRACAUDAL; PERINEURAL at 11:06

## 2024-12-02 ASSESSMENT — PAIN - FUNCTIONAL ASSESSMENT
PAIN_FUNCTIONAL_ASSESSMENT: 0-10
PAIN_FUNCTIONAL_ASSESSMENT: NONE - DENIES PAIN

## 2024-12-02 ASSESSMENT — ENCOUNTER SYMPTOMS: SHORTNESS OF BREATH: 0

## 2024-12-02 ASSESSMENT — PAIN DESCRIPTION - DESCRIPTORS: DESCRIPTORS: DISCOMFORT

## 2024-12-02 NOTE — OP NOTE
.PROCEDURE NOTE    DATE OF PROCEDURE: 12/2/2024    SURGEON: Audelia Nava MD    ASSISTANT: None    PREOPERATIVE DIAGNOSIS: SCREENING  HX OF RECTOCELE  STOOL INCONTINENCE    POSTOPERATIVE DIAGNOSIS: as described below    OPERATION: Total colonoscopy   RANDOM BIOPSIES   ANESTHESIA: MAC PER ANESTHESIA     ESTIMATED BLOOD LOSS: less than 50     COMPLICATIONS: None.     SPECIMENS:  Was Obtained:     HISTORY: The patient is a 69 y.o. year old female with history of above preop diagnosis.  I recommended colonoscopy with possible biopsy or polypectomy and I explained the risk, benefits, expected outcome, and alternatives to the procedure.  Risks included but are not limited to bleeding, infection, respiratory distress, hypotension, and perforation of the colon and possibility of missing a lesion.  The patient understands and is in agreement.      PROCEDURE: The patient was given IV conscious sedation.  The patient's SPO2 remained above 90% throughout the procedure. The colonoscope was inserted per rectum and advanced under direct vision to the cecum without difficulty.  The prep was good.    FROTHY LIQUID STOOLS    Findings:  Terminal ileum: normal    Cecum/Ascending colon: normal  RANDOM BIOPSIES WERE TAKEN   Transverse colon: normal    Descending/Sigmoid colon: abnormal: DIVERTICULOSIS    Rectum/Anus: examined in normal and retroflexed positions and was abnormal: INT HEMORRHOIDS     Withdrawal Time was (minutes): 11    The colon was decompressed and the scope was removed.  The patient tolerated the procedure well.     Recommendations/Plan:   Lifestyle and dietary modifications as discussed  F/U Biopsies  F/U In Office in 3-4 weeks  Discussed with the family  Colonoscopy Recall :8 year  POST SEDATION PATIENT WAS STABLE WITH STABLE VITAL SIGNS AND OXYGEN SATURATIONS AND WAS DISCHARGED HOME WITH RIDE IN A STABLE CONDITION.    Electronically signed by Audelia Nava MD  on 12/2/2024 at 11:14 AM

## 2024-12-02 NOTE — OP NOTE
.PROCEDURE NOTE    DATE OF PROCEDURE: 12/2/2024     SURGEON: Audelia Nava MD    ASSISTANT: None    PREOPERATIVE DIAGNOSIS: GERD  DYSPHAGIA    POSTOPERATIVE DIAGNOSIS: As described below    OPERATION: Upper GI endoscopy with Biopsy    ANESTHESIA: MAC PER ANESTHESIA     ESTIMATED BLOOD LOSS: Less than 50 ml    COMPLICATIONS: None.     SPECIMENS:  Was Obtained:     HISTORY: The patient is a 69 y.o. year old female with history of above preop diagnosis.  I recommended esophagogastroduodenoscopy with possible biopsy and I explained the risk, benefits, expected outcome, and alternatives to the procedure.  Risks included but are not limited to bleeding, infection, respiratory distress, hypotension, and perforation of the esophagus, stomach, or duodenum.  Patient understands and is in agreement.    PROCEDURE: The patient was given IV conscious sedation.  The patient's SPO2 remained above 90% throughout the procedure. The gastroscope was inserted orally and advanced under direct vision through the esophagus, through the stomach, through the pylorus, and into the descending duodenum.      Findings:    Retropharyngeal area was grossly normal appearing    Esophagus: normal  BESIDES SOME TERTIARY CONTRACTIONS  PICTURES WERE TAKEN   Stomach:    Fundus: abnormal: POLYPOSIS MULTIPLE POLYPS SIZING 3-4  MM WERE NOTED  BIOPSIES AND PICTURES WERE TAKEN     Body: normal    Antrum: abnormal: MILD GASTRITIS WAS BIOPSIED    Duodenum:     Descending: normal    Bulb: normal    The scope was removed and the patient tolerated the procedure well.     Recommendations/Plan:   F/U Biopsies  F/U In Office in 3-4 weeks  Discussed with the family  Post sedation patient was stable with stable vital signs and stable O2 saturations    Electronically signed by Audelia Nava MD  on 12/2/2024 at 11:12 AM

## 2024-12-02 NOTE — ANESTHESIA PRE PROCEDURE
Department of Anesthesiology  Preprocedure Note       Name:  Dot Smith   Age:  69 y.o.  :  1955                                          MRN:  [id number]         Date:  2024      Surgeon: Surgeon(s):  Audelia Nava MD    Procedure: Procedure(s):  ESOPHAGOGASTRODUODENOSCOPY BIOPSY  COLONOSCOPY DIAGNOSTIC    Medications prior to admission:   Prior to Admission medications    Medication Sig Start Date End Date Taking? Authorizing Provider   polyethylene glycol (GLYCOLAX) 17 GM/SCOOP powder Follow instructions given by provider 24  Yes Audelia Nava MD   bisacodyl 5 MG EC tablet Use as instructed from your physicians office for your colonoscopy prep. 24  Yes Audelia Nava MD   Calcium-Magnesium 200-50 MG TABS Take 1 tablet by mouth daily 24  Yes Audelia Nava MD   Cranberry 500 MG CHEW Take by mouth   Yes Boby Castillo MD   atorvastatin (LIPITOR) 10 MG tablet TAKE 1 TABLET BY MOUTH DAILY 24  Yes Darshan Zamora APRN - CNP   D-Mannose 500 MG CAPS Take by mouth   Yes Boby Castillo MD   magnesium 30 MG tablet Take 1 tablet by mouth 2 times daily   Yes Boby Castillo MD   NONFORMULARY daily [place] D-Mannose   Yes Boby Castillo MD   omeprazole (PRILOSEC) 20 MG delayed release capsule Take 1 capsule by mouth daily   Yes Boby Castillo MD   Lactobacillus (CULTURELLE DIGESTIVE WOMENS) CAPS Take 1 capsule by mouth daily 22  Yes Lesa Arizmendi PA-C   vitamin D 25 MCG (1000 UT) CAPS Take by mouth   Yes Boby Castillo MD   GLUCOSAMINE-CHONDROITIN ER PO Take by mouth daily   Yes Boby Castillo MD   Ascorbic Acid (VITAMIN C) 1000 MG tablet Take 1 tablet by mouth daily   Yes Boby Castillo MD   Multiple Vitamins-Minerals (MULTIVITAMIN WOMEN 50+ PO) Take by mouth   Yes Boby Castillo MD       Current medications:    No current facility-administered medications for this encounter.       Allergies:    Allergies

## 2024-12-02 NOTE — H&P
etc.  Pt was advised about drinking ample amount of water without any colors or chemicals. Stress was given about regular exercise.    Pt has verbalized understanding and agreement to these modifications.    Pt seems to have signs and symptoms consistent with GERD, acid indigestion and heartburns. She was discussed  in detail about some possible life style and dietary modifications. She was stressed about the maintenance  of appropriate weight and effect of obesity contributing to reflux symptoms. Routine exercise was streesed.     Avoidance of Caffeine, nicotine and chocolate were explained. Pt was asked to avoid spices grease and fried food. Advices were also given about avoidance of any kind of fast foods, soda pops and high energy drinks.    Pt was advised to place two small block under the head end of the bed which may help with night time reflux. Was advised not to eat any thin at least 2-3 hrs before going to bed and walk especially after dinner    Pt has verbalized understanding and agreement to this plan.    The patient was instructed to start taking some OTC Probiotics products   These are available over the counter at the Pharmacy stores and Grocery stores  He was explained about the beneficial effects they have in the GI track  They will help to establish the good bacterial warner and will help with the digestion and bowel movements  The patient has verbalized understanding and agreement to this plan    Thank you for allowing me to participate in the care of Ms. Smith. For any further questions please do not hesitate to contact me.    I have reviewed and agree with the ROS entered by the MA/Nurse.     This note is created with the assistance of the speech recognition program.  While intending to generate a document that actually reflects the content of the visit, document can still have some errors including those of syntax and sound like substitutions which may escape proof reading.  Actual meaning can

## 2024-12-02 NOTE — ANESTHESIA POSTPROCEDURE EVALUATION
Department of Anesthesiology  Postprocedure Note    Patient: Dot Smith  MRN: [id number]  YOB: 1955  Date of evaluation: 12/2/2024    Procedure Summary       Date: 12/02/24 Room / Location: [address]    Anesthesia Start: 1059 Anesthesia Stop: 1141    Procedures:       ESOPHAGOGASTRODUODENOSCOPY BIOPSY      COLONOSCOPY BIOPSY Diagnosis:       Abdominal pain, right upper quadrant      (Abdominal pain, right upper quadrant [R10.11])    Surgeons: Audelia Nava MD Responsible Provider: Cody Boles MD    Anesthesia Type: MAC ASA Status: 2            Anesthesia Type: No value filed.    David Phase I: David Score: 10    David Phase II: David Score: 10    Anesthesia Post Evaluation    Airway patency: patent  Cardiovascular status: hemodynamically stable  Respiratory status: acceptable    No notable events documented.

## 2024-12-02 NOTE — DISCHARGE INSTRUCTIONS
Upper GI Endoscopy: What to Expect at Home  Your Recovery  You may have a sore throat for a day or two after the test.  How can you care for yourself at home?  Activity  Rest as much as you need to after you go home.  You should be able to go back to your usual activities the day after the test.  Diet  Drink plenty of fluids (unless your doctor has told you not to).  Follow-up care is a key part of your treatment and safety. Be sure to make and go to all appointments, and call your doctor if you are having problems.  When should you call for help?  Call 911 anytime you think you may need emergency care. For example, call if:  You passed out (lost consciousness).  You cough up blood.  You vomit blood or what looks like coffee grounds.  You pass maroon or very bloody stools.  Call your doctor now or seek immediate medical care if:  You have trouble swallowing.  You have belly pain.  Your stools are black and tarlike or have streaks of blood.  You are sick to your stomach or cannot keep fluids down.  Watch closely for changes in your health, and be sure to contact your doctor if:  Your throat still hurts after a day or two.  You do not get better as expected.   Where can you learn more?   Go to [email or web address] and sign in to your [employer] account. Enter J454 in the Search Health Information box to learn more about “Upper GI Endoscopy: What to Expect at Home.”    .     © 9826-4544 [employer]. Care instructions adapted under license by [employer]. This care instruction is for use with your licensed healthcare professional. If you have questions about a medical condition or this instruction, always ask your healthcare professional. [employer] disclaims any warranty or liability for your use of this information.  Content Version: 9.9.459647; Last Revised: February 20, 2013          Colonoscopy: What to Expect at Home  Your Recovery  Your doctor will talk to

## 2024-12-04 LAB — SURGICAL PATHOLOGY REPORT: NORMAL

## 2024-12-10 ENCOUNTER — OFFICE VISIT (OUTPATIENT)
Dept: GASTROENTEROLOGY | Age: 69
End: 2024-12-10
Payer: MEDICARE

## 2024-12-10 VITALS
BODY MASS INDEX: 27.1 KG/M2 | WEIGHT: 173 LBS | TEMPERATURE: 97.7 F | SYSTOLIC BLOOD PRESSURE: 139 MMHG | DIASTOLIC BLOOD PRESSURE: 81 MMHG

## 2024-12-10 DIAGNOSIS — K58.9 IRRITABLE BOWEL SYNDROME, UNSPECIFIED TYPE: ICD-10-CM

## 2024-12-10 DIAGNOSIS — R13.19 ESOPHAGEAL DYSPHAGIA: ICD-10-CM

## 2024-12-10 DIAGNOSIS — Z98.890 HISTORY OF RECONSTRUCTIVE REPAIR OF RECTOCELE: ICD-10-CM

## 2024-12-10 DIAGNOSIS — R15.9 FULL INCONTINENCE OF FECES: ICD-10-CM

## 2024-12-10 DIAGNOSIS — Z12.11 COLON CANCER SCREENING: Primary | ICD-10-CM

## 2024-12-10 DIAGNOSIS — R10.31 ABDOMINAL PAIN, RIGHT LOWER QUADRANT: ICD-10-CM

## 2024-12-10 DIAGNOSIS — K21.9 GASTROESOPHAGEAL REFLUX DISEASE WITHOUT ESOPHAGITIS: ICD-10-CM

## 2024-12-10 DIAGNOSIS — R10.11 ABDOMINAL PAIN, RIGHT UPPER QUADRANT: ICD-10-CM

## 2024-12-10 PROCEDURE — G8400 PT W/DXA NO RESULTS DOC: HCPCS | Performed by: INTERNAL MEDICINE

## 2024-12-10 PROCEDURE — 3017F COLORECTAL CA SCREEN DOC REV: CPT | Performed by: INTERNAL MEDICINE

## 2024-12-10 PROCEDURE — 99214 OFFICE O/P EST MOD 30 MIN: CPT | Performed by: INTERNAL MEDICINE

## 2024-12-10 PROCEDURE — G8417 CALC BMI ABV UP PARAM F/U: HCPCS | Performed by: INTERNAL MEDICINE

## 2024-12-10 PROCEDURE — 1123F ACP DISCUSS/DSCN MKR DOCD: CPT | Performed by: INTERNAL MEDICINE

## 2024-12-10 PROCEDURE — 1159F MED LIST DOCD IN RCRD: CPT | Performed by: INTERNAL MEDICINE

## 2024-12-10 PROCEDURE — G8427 DOCREV CUR MEDS BY ELIG CLIN: HCPCS | Performed by: INTERNAL MEDICINE

## 2024-12-10 PROCEDURE — 1090F PRES/ABSN URINE INCON ASSESS: CPT | Performed by: INTERNAL MEDICINE

## 2024-12-10 PROCEDURE — 1036F TOBACCO NON-USER: CPT | Performed by: INTERNAL MEDICINE

## 2024-12-10 PROCEDURE — G8482 FLU IMMUNIZE ORDER/ADMIN: HCPCS | Performed by: INTERNAL MEDICINE

## 2024-12-10 ASSESSMENT — ENCOUNTER SYMPTOMS
BLOOD IN STOOL: 0
CONSTIPATION: 0
ABDOMINAL DISTENTION: 0
CHOKING: 0
DIARRHEA: 0
VOMITING: 0
ANAL BLEEDING: 0
RECTAL PAIN: 0
ABDOMINAL PAIN: 1
VOICE CHANGE: 0
WHEEZING: 0
SHORTNESS OF BREATH: 0
SORE THROAT: 0
TROUBLE SWALLOWING: 0
NAUSEA: 0
COUGH: 0

## 2024-12-10 NOTE — PROGRESS NOTES
GI CLINIC FOLLOW UP    NTERVAL HISTORY:   No referring provider defined for this encounter.    Chief Complaint   Patient presents with    Results     Patient is here today for a f/u from colonoscopy/EGD procedure completed on 12/2/24.       1. Colon cancer screening    2. Abdominal pain, right upper quadrant    3. Abdominal pain, right lower quadrant    4. Full incontinence of feces    5. Irritable bowel syndrome, unspecified type    6. History of reconstructive repair of rectocele    7. Gastroesophageal reflux disease without esophagitis    8. Esophageal dysphagia       The patient is here as a follow up of her recent GI procedure.   The results have been sent to you separately   The findings were explained to the patient in detail and biopsies were also discussed   with her    She had some gastric polyps revealed them to be fundic gland polyp  Mild gastritis was noted    Colonoscopy revealed some frothy liquid stools    Diverticulosis was noted    Internal hemorrhoids were seen    Overall clinically feeling okay    Some irritable bowel syndrome like symptoms    No bleeding no melena    Previous records were reviewed with her    HISTORY OF PRESENT ILLNESS: Ms.Vickie CATIE Smith is a 69 y.o. female with a past history remarkable for , referred for evaluation of   Chief Complaint   Patient presents with    Results     Patient is here today for a f/u from colonoscopy/EGD procedure completed on 12/2/24.   .    Past Medical,Family, and Social History reviewed and does contribute to the patient presenting condition.    Patient's PMH/PSH,SH,PSYCH Hx, MEDs, ALLERGIES, and ROS were all reviewed and updated in the appropriate sections.    PAST MEDICAL HISTORY:  Past Medical History:   Diagnosis Date    Arthritis     lower back and knee    Elevated cholesterol     on rx per pcp    Esophageal stricture     GERD (gastroesophageal reflux disease)     otc prilosec    Posterior vaginal wall prolapse     Presence of pessary

## 2024-12-16 ENCOUNTER — OFFICE VISIT (OUTPATIENT)
Dept: FAMILY MEDICINE CLINIC | Age: 69
End: 2024-12-16
Payer: MEDICARE

## 2024-12-16 VITALS
OXYGEN SATURATION: 96 % | WEIGHT: 175 LBS | TEMPERATURE: 97.5 F | HEIGHT: 67 IN | HEART RATE: 73 BPM | BODY MASS INDEX: 27.47 KG/M2 | DIASTOLIC BLOOD PRESSURE: 66 MMHG | SYSTOLIC BLOOD PRESSURE: 118 MMHG

## 2024-12-16 DIAGNOSIS — R10.84 GENERALIZED ABDOMINAL PAIN: Primary | ICD-10-CM

## 2024-12-16 DIAGNOSIS — R35.0 URINARY FREQUENCY: ICD-10-CM

## 2024-12-16 PROCEDURE — G8400 PT W/DXA NO RESULTS DOC: HCPCS | Performed by: REGISTERED NURSE

## 2024-12-16 PROCEDURE — G8417 CALC BMI ABV UP PARAM F/U: HCPCS | Performed by: REGISTERED NURSE

## 2024-12-16 PROCEDURE — 99213 OFFICE O/P EST LOW 20 MIN: CPT | Performed by: REGISTERED NURSE

## 2024-12-16 PROCEDURE — 3017F COLORECTAL CA SCREEN DOC REV: CPT | Performed by: REGISTERED NURSE

## 2024-12-16 PROCEDURE — 1159F MED LIST DOCD IN RCRD: CPT | Performed by: REGISTERED NURSE

## 2024-12-16 PROCEDURE — G8427 DOCREV CUR MEDS BY ELIG CLIN: HCPCS | Performed by: REGISTERED NURSE

## 2024-12-16 PROCEDURE — 1123F ACP DISCUSS/DSCN MKR DOCD: CPT | Performed by: REGISTERED NURSE

## 2024-12-16 PROCEDURE — 1090F PRES/ABSN URINE INCON ASSESS: CPT | Performed by: REGISTERED NURSE

## 2024-12-16 PROCEDURE — 1036F TOBACCO NON-USER: CPT | Performed by: REGISTERED NURSE

## 2024-12-16 PROCEDURE — G8482 FLU IMMUNIZE ORDER/ADMIN: HCPCS | Performed by: REGISTERED NURSE

## 2024-12-16 ASSESSMENT — ENCOUNTER SYMPTOMS
SHORTNESS OF BREATH: 0
ABDOMINAL PAIN: 1
RESPIRATORY NEGATIVE: 1
COUGH: 0
BACK PAIN: 1

## 2024-12-16 ASSESSMENT — PATIENT HEALTH QUESTIONNAIRE - PHQ9
SUM OF ALL RESPONSES TO PHQ9 QUESTIONS 1 & 2: 1
SUM OF ALL RESPONSES TO PHQ QUESTIONS 1-9: 1
1. LITTLE INTEREST OR PLEASURE IN DOING THINGS: NOT AT ALL
SUM OF ALL RESPONSES TO PHQ QUESTIONS 1-9: 1
2. FEELING DOWN, DEPRESSED OR HOPELESS: SEVERAL DAYS

## 2024-12-16 NOTE — PROGRESS NOTES
Final   Hospital Outpatient Visit on 2024   Component Date Value Ref Range Status    Specimen Description 2024 .CLEAN CATCH URINE   Final    Special Requests 2024 Site: Urine   Final    Culture 2024 ESCHERICHIA COLI >100,000 CFU/ML (A)   Final   Office Visit on 2024   Component Date Value Ref Range Status    Color, UA 2024 yellow   Final    Clarity, UA 2024 cloudy   Final    Glucose, UA POC 2024 negative   Final    Bilirubin, UA 2024 negative   Final    Ketones, UA 2024 negative   Final    Spec Grav, UA 2024 1.020   Final    Blood, UA POC 2024 moderate   Final    pH, UA 2024 6.0   Final    Protein, UA POC 2024 30 mg   Final    Urobilinogen, UA 2024 0.2   Final    Leukocytes, UA 2024 small   Final    Nitrite, UA 2024 positive   Final    Internal Control 2024 yes   Final    Lot/Kit Number [id number]   Final    Lot/Kit  date: 2024 2-15-25   Final    SARS-COV-2, POC 2024 Detected (A)  Not Detected Final    Influenza A Antigen, POC 2024 Negative   Final    Influenza B Antigen, POC 2024 Negative   Final    Vendor and kit name 2024 Veritor   Final        \"Life is what you make it... Be kind to others but most important be kind to yourself\"      Electronically signed by ALANA Dominguez CNP on 2024 at 2:59 PM

## 2025-01-01 PROBLEM — Z12.11 SCREENING FOR COLORECTAL CANCER: Status: RESOLVED | Noted: 2024-12-02 | Resolved: 2025-01-01

## 2025-01-01 PROBLEM — Z12.12 SCREENING FOR COLORECTAL CANCER: Status: RESOLVED | Noted: 2024-12-02 | Resolved: 2025-01-01

## 2025-02-20 ENCOUNTER — OFFICE VISIT (OUTPATIENT)
Dept: FAMILY MEDICINE CLINIC | Age: 70
End: 2025-02-20
Payer: MEDICARE

## 2025-02-20 VITALS
BODY MASS INDEX: 26.37 KG/M2 | TEMPERATURE: 98.1 F | WEIGHT: 168 LBS | SYSTOLIC BLOOD PRESSURE: 122 MMHG | HEIGHT: 67 IN | DIASTOLIC BLOOD PRESSURE: 86 MMHG

## 2025-02-20 DIAGNOSIS — Z00.00 MEDICARE ANNUAL WELLNESS VISIT, SUBSEQUENT: Primary | ICD-10-CM

## 2025-02-20 PROBLEM — R30.0 DYSURIA: Status: ACTIVE | Noted: 2024-04-27

## 2025-02-20 PROBLEM — N39.0 URINARY TRACT INFECTION, SITE NOT SPECIFIED: Status: RESOLVED | Noted: 2020-10-14 | Resolved: 2025-02-20

## 2025-02-20 PROBLEM — R10.11 ABDOMINAL PAIN, RIGHT UPPER QUADRANT: Status: RESOLVED | Noted: 2024-11-25 | Resolved: 2025-02-20

## 2025-02-20 PROBLEM — R10.9 ABDOMINAL PAIN: Status: ACTIVE | Noted: 2025-02-20

## 2025-02-20 PROBLEM — R10.9 ABDOMINAL PAIN: Status: RESOLVED | Noted: 2025-02-20 | Resolved: 2025-02-20

## 2025-02-20 PROBLEM — R11.0 NAUSEA: Status: RESOLVED | Noted: 2024-11-25 | Resolved: 2025-02-20

## 2025-02-20 PROBLEM — S83.282A ACUTE LATERAL MENISCUS TEAR OF LEFT KNEE: Status: RESOLVED | Noted: 2023-09-21 | Resolved: 2025-02-20

## 2025-02-20 PROBLEM — R11.0 NAUSEA: Status: ACTIVE | Noted: 2024-11-25

## 2025-02-20 PROCEDURE — 1123F ACP DISCUSS/DSCN MKR DOCD: CPT | Performed by: REGISTERED NURSE

## 2025-02-20 PROCEDURE — G0439 PPPS, SUBSEQ VISIT: HCPCS | Performed by: REGISTERED NURSE

## 2025-02-20 PROCEDURE — 3017F COLORECTAL CA SCREEN DOC REV: CPT | Performed by: REGISTERED NURSE

## 2025-02-20 PROCEDURE — 1159F MED LIST DOCD IN RCRD: CPT | Performed by: REGISTERED NURSE

## 2025-02-20 SDOH — ECONOMIC STABILITY: FOOD INSECURITY: WITHIN THE PAST 12 MONTHS, YOU WORRIED THAT YOUR FOOD WOULD RUN OUT BEFORE YOU GOT MONEY TO BUY MORE.: NEVER TRUE

## 2025-02-20 SDOH — ECONOMIC STABILITY: FOOD INSECURITY: WITHIN THE PAST 12 MONTHS, THE FOOD YOU BOUGHT JUST DIDN'T LAST AND YOU DIDN'T HAVE MONEY TO GET MORE.: NEVER TRUE

## 2025-02-20 ASSESSMENT — PATIENT HEALTH QUESTIONNAIRE - PHQ9
2. FEELING DOWN, DEPRESSED OR HOPELESS: NOT AT ALL
SUM OF ALL RESPONSES TO PHQ QUESTIONS 1-9: 0
1. LITTLE INTEREST OR PLEASURE IN DOING THINGS: NOT AT ALL
SUM OF ALL RESPONSES TO PHQ QUESTIONS 1-9: 0
SUM OF ALL RESPONSES TO PHQ9 QUESTIONS 1 & 2: 0

## 2025-02-20 ASSESSMENT — LIFESTYLE VARIABLES
HOW MANY STANDARD DRINKS CONTAINING ALCOHOL DO YOU HAVE ON A TYPICAL DAY: PATIENT DOES NOT DRINK
HOW OFTEN DO YOU HAVE A DRINK CONTAINING ALCOHOL: NEVER

## 2025-02-20 NOTE — PROGRESS NOTES
Medicare Annual Wellness Visit    Dot Smith is here for Medicare AWV    Assessment & Plan   Medicare annual wellness visit, subsequent     Return in 1 year (on 2/20/2026) for Medicare AWV.     Subjective       Patient's complete Health Risk Assessment and screening values have been reviewed and are found in Flowsheets. The following problems were reviewed today and where indicated follow up appointments were made and/or referrals ordered.    Positive Risk Factor Screenings with Interventions:    Fall Risk:  Do you feel unsteady or are you worried about falling? : (!) yes  2 or more falls in past year?: no  Fall with injury in past year?: (!) yes     Interventions:    Patient comments: patient had a fall on the ice, she states other wise she is doing good.  Reviewed medications, home hazards, visual acuity, and co-morbidities that can increase risk for falls             Inactivity:  On average, how many days per week do you engage in moderate to strenuous exercise (like a brisk walk)?: 0 days (!) Abnormal  On average, how many minutes do you engage in exercise at this level?: 0 min  Interventions:  See AVS for additional education material    Poor Eating Habits/Diet:  Do you eat balanced/healthy meals regularly?: (!) No  Interventions:  See AVS for additional education material      Hearing Screen:  Do you or your family notice any trouble with your hearing that hasn't been managed with hearing aids?: (!) Yes    Interventions:  Has been screened by audiologist at her old job.    Vision Screen:  Do you have difficulty driving, watching TV, or doing any of your daily activities because of your eyesight?: No  Have you had an eye exam within the past year?: (!) No  Interventions:   Patient encouraged to make appointment with their eye specialist    Safety:  Do you have any tripping hazards - loose or unsecured carpets or rugs?: (!) Yes  Interventions:  See AVS for additional education material     Advanced

## 2025-02-20 NOTE — PATIENT INSTRUCTIONS
feel dizzy after you take medicine, avoid activity at that time. Try being active before you take your medicine. This will reduce your risk of falls.  If you plan to be active at home, make sure to clear your space before you get started. Remove things like TV cords, coffee tables, and throw rugs. It's safest to have plenty of space to move freely.  The key to getting more active is to take it slow and steady. Try to improve only a little bit at a time. Pick just one area to improve on at first. And if an activity hurts, stop and talk to your doctor.  Where can you learn more?  Go to https://www.[employer].net/patientEd and enter P600 to learn more about \"Learning About Being Active as an Older Adult.\"  Current as of: July 31, 2024  Content Version: 14.3  © 2024 [employer].   Care instructions adapted under license by [employer]. If you have questions about a medical condition or this instruction, always ask your healthcare professional. [employer], disclaims any warranty or liability for your use of this information.         Hearing Loss: Care Instructions  Overview     Hearing loss is a sudden or slow decrease in how well you hear. It can range from slight to profound. Permanent hearing loss can occur with aging. It also can happen when you are exposed long-term to loud noise. Examples include listening to loud music, riding motorcycles, or being around other loud machines.  Hearing loss can affect your work and home life. It can make you feel lonely or depressed. You may feel that you have lost your independence. But hearing aids and other devices can help you hear better and feel connected to others.  Follow-up care is a key part of your treatment and safety. Be sure to make and go to all appointments, and call your doctor if you are having problems. It's also a good idea to know your test results and keep a list of the medicines you take.  How can you care for yourself at home?  Avoid

## 2025-05-20 DIAGNOSIS — E78.5 HYPERLIPIDEMIA, UNSPECIFIED HYPERLIPIDEMIA TYPE: ICD-10-CM

## 2025-05-20 RX ORDER — ATORVASTATIN CALCIUM 10 MG/1
10 TABLET, FILM COATED ORAL DAILY
Qty: 30 TABLET | Refills: 5 | Status: SHIPPED | OUTPATIENT
Start: 2025-05-20

## 2025-05-20 NOTE — TELEPHONE ENCOUNTER
LOV 02/23/2025   RTO 02/23/2026  LRF 11/13/2024          Controlled Substance Monitoring:    Acute and Chronic Pain Monitoring:        No data to display

## 2025-05-23 ENCOUNTER — HOSPITAL ENCOUNTER (OUTPATIENT)
Dept: GENERAL RADIOLOGY | Facility: CLINIC | Age: 70
Discharge: HOME | End: 2025-05-25
Payer: MEDICARE

## 2025-05-23 ENCOUNTER — HOSPITAL ENCOUNTER (OUTPATIENT)
Age: 70
Setting detail: SPECIMEN
Discharge: HOME | End: 2025-05-23

## 2025-05-23 ENCOUNTER — OFFICE VISIT (OUTPATIENT)
Dept: FAMILY MEDICINE CLINIC | Age: 70
End: 2025-05-23
Payer: MEDICARE

## 2025-05-23 VITALS
DIASTOLIC BLOOD PRESSURE: 88 MMHG | OXYGEN SATURATION: 98 % | SYSTOLIC BLOOD PRESSURE: 134 MMHG | TEMPERATURE: 97.1 F | HEART RATE: 70 BPM | WEIGHT: 177 LBS | BODY MASS INDEX: 27.72 KG/M2

## 2025-05-23 DIAGNOSIS — N39.0 URINARY TRACT INFECTION WITHOUT HEMATURIA, SITE UNSPECIFIED: ICD-10-CM

## 2025-05-23 DIAGNOSIS — R35.0 URINARY FREQUENCY: ICD-10-CM

## 2025-05-23 DIAGNOSIS — R30.0 DYSURIA: ICD-10-CM

## 2025-05-23 DIAGNOSIS — R35.0 URINARY FREQUENCY: Primary | ICD-10-CM

## 2025-05-23 DIAGNOSIS — R10.9 FLANK PAIN: ICD-10-CM

## 2025-05-23 LAB
ANION GAP SERPL CALCULATED.3IONS-SCNC: 10 MMOL/L (ref 9–16)
BASOPHILS # BLD: 0.06 K/UL (ref 0–0.2)
BASOPHILS NFR BLD: 1 % (ref 0–2)
BILIRUBIN, POC: ABNORMAL
BLOOD URINE, POC: ABNORMAL
BUN SERPL-MCNC: 10 MG/DL (ref 8–23)
CALCIUM SERPL-MCNC: 9.7 MG/DL (ref 8.6–10.4)
CHLORIDE SERPL-SCNC: 106 MMOL/L (ref 98–107)
CLARITY, POC: ABNORMAL
CO2 SERPL-SCNC: 28 MMOL/L (ref 20–31)
COLOR, POC: YELLOW
CREAT SERPL-MCNC: 0.7 MG/DL (ref 0.6–0.9)
EOSINOPHIL # BLD: 0.08 K/UL (ref 0–0.44)
EOSINOPHILS RELATIVE PERCENT: 1 % (ref 1–4)
ERYTHROCYTE [DISTWIDTH] IN BLOOD BY AUTOMATED COUNT: 13.7 % (ref 11.8–14.4)
GFR, ESTIMATED: >90 ML/MIN/1.73M2
GLUCOSE SERPL-MCNC: 99 MG/DL (ref 74–99)
GLUCOSE URINE, POC: ABNORMAL MG/DL
HCT VFR BLD AUTO: 42.6 % (ref 36.3–47.1)
HGB BLD-MCNC: 13.7 G/DL (ref 11.9–15.1)
IMM GRANULOCYTES # BLD AUTO: <0.03 K/UL (ref 0–0.3)
IMM GRANULOCYTES NFR BLD: 0 %
KETONES, POC: ABNORMAL MG/DL
LEUKOCYTE EST, POC: ABNORMAL
LYMPHOCYTES NFR BLD: 1.76 K/UL (ref 1.1–3.7)
LYMPHOCYTES RELATIVE PERCENT: 29 % (ref 24–43)
MCH RBC QN AUTO: 28.8 PG (ref 25.2–33.5)
MCHC RBC AUTO-ENTMCNC: 32.2 G/DL (ref 28.4–34.8)
MCV RBC AUTO: 89.7 FL (ref 82.6–102.9)
MONOCYTES NFR BLD: 0.49 K/UL (ref 0.1–1.2)
MONOCYTES NFR BLD: 8 % (ref 3–12)
NEUTROPHILS NFR BLD: 61 % (ref 36–65)
NEUTS SEG NFR BLD: 3.74 K/UL (ref 1.5–8.1)
NITRITE, POC: POSITIVE
NRBC BLD-RTO: 0 PER 100 WBC
PH, POC: 5.5
PLATELET # BLD AUTO: 201 K/UL (ref 138–453)
PMV BLD AUTO: 12.5 FL (ref 8.1–13.5)
POTASSIUM SERPL-SCNC: 3.9 MMOL/L (ref 3.7–5.3)
PROTEIN, POC: 100 MG/DL
RBC # BLD AUTO: 4.75 M/UL (ref 3.95–5.11)
SODIUM SERPL-SCNC: 144 MMOL/L (ref 136–145)
SPECIFIC GRAVITY, POC: 1.02
UROBILINOGEN, POC: 0.2 MG/DL
WBC OTHER # BLD: 6.1 K/UL (ref 3.5–11.3)

## 2025-05-23 PROCEDURE — 1160F RVW MEDS BY RX/DR IN RCRD: CPT

## 2025-05-23 PROCEDURE — 1090F PRES/ABSN URINE INCON ASSESS: CPT

## 2025-05-23 PROCEDURE — G8400 PT W/DXA NO RESULTS DOC: HCPCS

## 2025-05-23 PROCEDURE — G8427 DOCREV CUR MEDS BY ELIG CLIN: HCPCS

## 2025-05-23 PROCEDURE — 81003 URINALYSIS AUTO W/O SCOPE: CPT

## 2025-05-23 PROCEDURE — 1036F TOBACCO NON-USER: CPT

## 2025-05-23 PROCEDURE — 99214 OFFICE O/P EST MOD 30 MIN: CPT

## 2025-05-23 PROCEDURE — 74018 RADEX ABDOMEN 1 VIEW: CPT

## 2025-05-23 PROCEDURE — 3017F COLORECTAL CA SCREEN DOC REV: CPT

## 2025-05-23 PROCEDURE — 1159F MED LIST DOCD IN RCRD: CPT

## 2025-05-23 PROCEDURE — 1123F ACP DISCUSS/DSCN MKR DOCD: CPT

## 2025-05-23 PROCEDURE — G8417 CALC BMI ABV UP PARAM F/U: HCPCS

## 2025-05-23 RX ORDER — ESTRADIOL 0.1 MG/G
1 CREAM VAGINAL DAILY
COMMUNITY

## 2025-05-23 RX ORDER — PHENAZOPYRIDINE HYDROCHLORIDE 100 MG/1
100 TABLET, FILM COATED ORAL 3 TIMES DAILY PRN
Qty: 30 TABLET | Refills: 0 | Status: SHIPPED | OUTPATIENT
Start: 2025-05-23 | End: 2026-05-23

## 2025-05-23 RX ORDER — NITROFURANTOIN 25; 75 MG/1; MG/1
100 CAPSULE ORAL 2 TIMES DAILY
Qty: 20 CAPSULE | Refills: 0 | Status: SHIPPED | OUTPATIENT
Start: 2025-05-23 | End: 2025-06-02

## 2025-05-23 ASSESSMENT — ENCOUNTER SYMPTOMS
DIARRHEA: 0
CONSTIPATION: 0
BACK PAIN: 1
NAUSEA: 0
VOMITING: 0
ABDOMINAL PAIN: 1

## 2025-05-23 NOTE — PROGRESS NOTES
Dot Smith (:  1955) is a 70 y.o. female,Established patient, here for evaluation of the following chief complaint(s):  Dysuria (Dysuria & urinary frequency )      Assessment & Plan  Urinary tract infection without hematuria, site unspecified   Acute condition, recurrent, Supportive care with appropriate antipyretics and fluids.  - Discussed taking Macrobid twice a day for 10 days, and making sure to complete full antibiotic dosage.  Discussed using Pyridium as needed for pain, and warned of urine color change.  Discussed use of Pyridium for a couple days, and then pausing to see if symptoms are improving.  - Discussed with patient concern with left flank pain, and positive CVA.  Discussed x-ray to evaluate kidneys especially with longevity of symptoms.  Also discussed checking CBC and BMP to evaluate kidney function and immune responses.  - Encouraged increase fluid intake.   - Encouraged patient to call urology and moving up appointment as needed per their recommendations.   - Discussed that if symtpoms do not resolve with treatment, returning to office. Consider yeast test then.   Orders:    Basic Metabolic Panel; Future    CBC with Auto Differential; Future    nitrofurantoin, macrocrystal-monohydrate, (MACROBID) 100 MG capsule; Take 1 capsule by mouth 2 times daily for 10 days    phenazopyridine (PYRIDIUM) 100 MG tablet; Take 1 tablet by mouth 3 times daily as needed for Pain    XR ABDOMEN (KUB) (SINGLE AP VIEW); Future    Flank pain   Acute condition, new, CXR.  - Discussed with patient concern with left flank pain, and positive CVA.  Discussed x-ray to evaluate kidneys especially with longevity of symptoms.   Orders:    XR ABDOMEN (KUB) (SINGLE AP VIEW); Future    Urinary frequency  Orders:    POCT Urinalysis No Micro (Auto)    Culture, Urine; Future    XR ABDOMEN (KUB) (SINGLE AP VIEW); Future    Dysuria  Orders:    POCT Urinalysis No Micro (Auto)    Culture, Urine; Future    Understanding and

## 2025-05-25 LAB
MICROORGANISM SPEC CULT: ABNORMAL
SERVICE CMNT-IMP: ABNORMAL
SPECIMEN DESCRIPTION: ABNORMAL

## 2025-05-27 ENCOUNTER — RESULTS FOLLOW-UP (OUTPATIENT)
Dept: FAMILY MEDICINE CLINIC | Age: 70
End: 2025-05-27

## 2025-06-02 ENCOUNTER — OFFICE VISIT (OUTPATIENT)
Dept: GASTROENTEROLOGY | Age: 70
End: 2025-06-02
Payer: MEDICARE

## 2025-06-02 VITALS
TEMPERATURE: 97.5 F | BODY MASS INDEX: 27.41 KG/M2 | SYSTOLIC BLOOD PRESSURE: 132 MMHG | WEIGHT: 175 LBS | DIASTOLIC BLOOD PRESSURE: 78 MMHG

## 2025-06-02 DIAGNOSIS — K58.9 IRRITABLE BOWEL SYNDROME, UNSPECIFIED TYPE: ICD-10-CM

## 2025-06-02 DIAGNOSIS — R13.19 ESOPHAGEAL DYSPHAGIA: ICD-10-CM

## 2025-06-02 DIAGNOSIS — R10.11 ABDOMINAL PAIN, RIGHT UPPER QUADRANT: ICD-10-CM

## 2025-06-02 DIAGNOSIS — K21.9 GASTROESOPHAGEAL REFLUX DISEASE WITHOUT ESOPHAGITIS: Primary | ICD-10-CM

## 2025-06-02 DIAGNOSIS — R15.9 FULL INCONTINENCE OF FECES: ICD-10-CM

## 2025-06-02 DIAGNOSIS — Z12.11 COLON CANCER SCREENING: ICD-10-CM

## 2025-06-02 DIAGNOSIS — R10.31 ABDOMINAL PAIN, RIGHT LOWER QUADRANT: ICD-10-CM

## 2025-06-02 DIAGNOSIS — Z98.890 HISTORY OF RECONSTRUCTIVE REPAIR OF RECTOCELE: ICD-10-CM

## 2025-06-02 PROCEDURE — 3017F COLORECTAL CA SCREEN DOC REV: CPT | Performed by: INTERNAL MEDICINE

## 2025-06-02 PROCEDURE — G8400 PT W/DXA NO RESULTS DOC: HCPCS | Performed by: INTERNAL MEDICINE

## 2025-06-02 PROCEDURE — 99214 OFFICE O/P EST MOD 30 MIN: CPT | Performed by: INTERNAL MEDICINE

## 2025-06-02 PROCEDURE — G8417 CALC BMI ABV UP PARAM F/U: HCPCS | Performed by: INTERNAL MEDICINE

## 2025-06-02 PROCEDURE — 1123F ACP DISCUSS/DSCN MKR DOCD: CPT | Performed by: INTERNAL MEDICINE

## 2025-06-02 PROCEDURE — 1159F MED LIST DOCD IN RCRD: CPT | Performed by: INTERNAL MEDICINE

## 2025-06-02 PROCEDURE — 1036F TOBACCO NON-USER: CPT | Performed by: INTERNAL MEDICINE

## 2025-06-02 PROCEDURE — 1090F PRES/ABSN URINE INCON ASSESS: CPT | Performed by: INTERNAL MEDICINE

## 2025-06-02 PROCEDURE — G8427 DOCREV CUR MEDS BY ELIG CLIN: HCPCS | Performed by: INTERNAL MEDICINE

## 2025-06-02 ASSESSMENT — ENCOUNTER SYMPTOMS
VOICE CHANGE: 0
CHOKING: 0
NAUSEA: 0
ABDOMINAL DISTENTION: 0
ABDOMINAL PAIN: 0
TROUBLE SWALLOWING: 1
CONSTIPATION: 1
COUGH: 0
ANAL BLEEDING: 0
DIARRHEA: 0
RECTAL PAIN: 0
VOMITING: 0
WHEEZING: 0
BLOOD IN STOOL: 0
SORE THROAT: 0
SHORTNESS OF BREATH: 0

## 2025-06-02 NOTE — PROGRESS NOTES
GI CLINIC FOLLOW UP    NTERVAL HISTORY:   No referring provider defined for this encounter.    Chief Complaint   Patient presents with    Abdominal Pain     Patient is here today for a f/u last seen on 12/10/24 for RUQ/RLQ abdominal pain, IBS, GERD, & esophageal dysphagia.       1. Gastroesophageal reflux disease without esophagitis    2. Colon cancer screening    3. Abdominal pain, right upper quadrant    4. Abdominal pain, right lower quadrant    5. History of reconstructive repair of rectocele    6. Irritable bowel syndrome, unspecified type    7. Full incontinence of feces    8. Esophageal dysphagia      Here for f/u     Has been doing OK overall    Not able\ to loose weight    Has UTI issues    Mild obesity     Mild Obesity     No bleeding    No melena    No dysphagia    Previous records were reviewed     On PPI     HISTORY OF PRESENT ILLNESS: Ms.Vickie CATIE Smith is a 70 y.o. female with a past history remarkable for , referred for evaluation of   Chief Complaint   Patient presents with    Abdominal Pain     Patient is here today for a f/u last seen on 12/10/24 for RUQ/RLQ abdominal pain, IBS, GERD, & esophageal dysphagia.   .    Past Medical,Family, and Social History reviewed and does contribute to the patient presenting condition.    Patient's PMH/PSH,SH,PSYCH Hx, MEDs, ALLERGIES, and ROS were all reviewed and updated in the appropriate sections.    PAST MEDICAL HISTORY:  Past Medical History:   Diagnosis Date    Abdominal pain 02/20/2025    Abdominal pain, right upper quadrant 11/25/2024    Acute lateral meniscus tear of left knee 09/21/2023    Arthritis     lower back and knee    Elevated cholesterol     on rx per pcp    Esophageal stricture     GERD (gastroesophageal reflux disease)     otc prilosec    Nausea 11/25/2024    Posterior vaginal wall prolapse     Presence of pessary     Rectocele     Snores     denies apnea    Urinary tract infection, site not specified 10/14/2020    Vaginal enterocele

## 2025-06-16 ENCOUNTER — TELEPHONE (OUTPATIENT)
Dept: OBGYN CLINIC | Age: 70
End: 2025-06-16

## 2025-06-16 NOTE — TELEPHONE ENCOUNTER
71 y/o GYN called into clinic with concerns of her pessary. She states that pessary was in for 3 months. She removed it today and it was hard and painful to come out, there was blood on pessary, blood on a pad and her urologist took a look and said area looked inflamed.     Talked with Dr. Narvaez and she had stated that if the pessary hadn't been removed recently this is normal. Stated to call back within 5-7 days if pain/ bleeding did not improve.

## 2025-07-02 ENCOUNTER — OFFICE VISIT (OUTPATIENT)
Dept: FAMILY MEDICINE CLINIC | Age: 70
End: 2025-07-02
Payer: MEDICARE

## 2025-07-02 ENCOUNTER — HOSPITAL ENCOUNTER (OUTPATIENT)
Age: 70
Setting detail: SPECIMEN
Discharge: HOME | End: 2025-07-02

## 2025-07-02 ENCOUNTER — TELEPHONE (OUTPATIENT)
Dept: OBGYN CLINIC | Age: 70
End: 2025-07-02

## 2025-07-02 VITALS
OXYGEN SATURATION: 96 % | HEIGHT: 67 IN | TEMPERATURE: 99.8 F | BODY MASS INDEX: 27.15 KG/M2 | WEIGHT: 173 LBS | HEART RATE: 86 BPM | DIASTOLIC BLOOD PRESSURE: 64 MMHG | SYSTOLIC BLOOD PRESSURE: 118 MMHG

## 2025-07-02 DIAGNOSIS — R06.02 SHORTNESS OF BREATH: ICD-10-CM

## 2025-07-02 DIAGNOSIS — N30.01 ACUTE CYSTITIS WITH HEMATURIA: Primary | ICD-10-CM

## 2025-07-02 DIAGNOSIS — N30.01 ACUTE CYSTITIS WITH HEMATURIA: ICD-10-CM

## 2025-07-02 DIAGNOSIS — R42 DIZZINESS: ICD-10-CM

## 2025-07-02 LAB
BILIRUBIN, POC: NEGATIVE
BLOOD URINE, POC: ABNORMAL
CLARITY, POC: ABNORMAL
COLOR, POC: YELLOW
GLUCOSE URINE, POC: NEGATIVE MG/DL
KETONES, POC: NEGATIVE MG/DL
LEUKOCYTE EST, POC: ABNORMAL
NITRITE, POC: POSITIVE
PH, POC: 6
PROTEIN, POC: 100 MG/DL
SPECIFIC GRAVITY, POC: 1.02
UROBILINOGEN, POC: 0.2 MG/DL

## 2025-07-02 PROCEDURE — 1123F ACP DISCUSS/DSCN MKR DOCD: CPT | Performed by: REGISTERED NURSE

## 2025-07-02 PROCEDURE — 3017F COLORECTAL CA SCREEN DOC REV: CPT | Performed by: REGISTERED NURSE

## 2025-07-02 PROCEDURE — G8427 DOCREV CUR MEDS BY ELIG CLIN: HCPCS | Performed by: REGISTERED NURSE

## 2025-07-02 PROCEDURE — 1090F PRES/ABSN URINE INCON ASSESS: CPT | Performed by: REGISTERED NURSE

## 2025-07-02 PROCEDURE — 99213 OFFICE O/P EST LOW 20 MIN: CPT | Performed by: REGISTERED NURSE

## 2025-07-02 PROCEDURE — 81003 URINALYSIS AUTO W/O SCOPE: CPT | Performed by: REGISTERED NURSE

## 2025-07-02 PROCEDURE — 1036F TOBACCO NON-USER: CPT | Performed by: REGISTERED NURSE

## 2025-07-02 PROCEDURE — 1160F RVW MEDS BY RX/DR IN RCRD: CPT | Performed by: REGISTERED NURSE

## 2025-07-02 PROCEDURE — G8417 CALC BMI ABV UP PARAM F/U: HCPCS | Performed by: REGISTERED NURSE

## 2025-07-02 PROCEDURE — G8400 PT W/DXA NO RESULTS DOC: HCPCS | Performed by: REGISTERED NURSE

## 2025-07-02 PROCEDURE — 1159F MED LIST DOCD IN RCRD: CPT | Performed by: REGISTERED NURSE

## 2025-07-02 RX ORDER — LECITHIN 1200 MG
CAPSULE ORAL
COMMUNITY

## 2025-07-02 RX ORDER — CEPHALEXIN 500 MG/1
500 CAPSULE ORAL 2 TIMES DAILY
Qty: 14 CAPSULE | Refills: 0 | Status: SHIPPED | OUTPATIENT
Start: 2025-07-02 | End: 2025-07-09

## 2025-07-02 ASSESSMENT — ENCOUNTER SYMPTOMS
BACK PAIN: 1
SHORTNESS OF BREATH: 1

## 2025-07-02 NOTE — TELEPHONE ENCOUNTER
On 06/16/25- \"69 y/o GYN called into clinic with concerns of her pessary.   She states that pessary was in for 3 months. She removed it today and it was   hard and painful to come out, there was blood on pessary, blood on a pad   and her urologist took a look and said area looked inflamed.      Talked with Dr. Narvaez and she had stated that if the pessary hadn't been   removed recently this is normal. Stated to call back within 5-7 days if pain/ bleeding   did not improve.\"        Pt calling in TODAY 07/02- she states that her pessary has not been removed since 06/16 and she was also diagnosed with UTI. Her bleeding has stopped since then. Visited PCP today and was diagnosed with another UTI.   Pt would like to be seen and next available is not until September. Are you able to help me find an appointment?     Thank you!

## 2025-07-02 NOTE — TELEPHONE ENCOUNTER
Per the patient- It would be for \"inflammation of cervix\" as Dr. Troncoso office told her on 06/16 and her UTI's between 06/16 and now.   She had hyst in 2007.

## 2025-07-02 NOTE — PROGRESS NOTES
[facility]  [facility]  [facility]  [place] [address]  Dept: [phone]    CHIEF COMPLAINT:      Chief Complaint   Patient presents with    Urinary Tract Infection     Burning, cramping when urinating, worried that she is immune to antibiotics or has DM now, gets SOB and shakes at times          ASSESSMENT & PLAN     1. Acute cystitis with hematuria  -     cephALEXin (KEFLEX) 500 MG capsule; Take 1 capsule by mouth 2 times daily for 7 days, Disp-14 capsule, R-0Normal  -     POCT Urinalysis No Micro (Auto)  -     Culture, Urine; Future  2. Shortness of breath  -     Echo (TTE) complete (PRN contrast/bubble/strain/3D); Future  3. Dizziness  -     Vascular duplex carotid bilateral; Future     - Advised patient to start the Gemteza as ordered by Urology to see if will help.  - Advised to call Dr. Lala office and have an exam done especially if there was redness and swelling of cervix when Urology assessed her.    Return if symptoms worsen or fail to improve.       SUBJECTIVE/OBJECTIVE   Dot Smith is a 70 y.o. female who presents with complaint of urinary burning, frequency, shortness of breath, dizziness, near syncope.  She has history of pessary to help try and prevent her vagina from continuing to drop, bladder sling due to urgency, and rectocele.     Shortness of breath and dizziness  Shortness of breath off and on for \"quite a while\".  She cannot identify if it is worse with walking or sitting she just feels breathless at times. She states she has also noticed she seems to get dizzy easily. She states on Saturday while in the shower she felt like she was going to pass out. She did not seek medical attention.  She denies chest pain or palpitations.     Urinary symptoms  She reports having extreme pain with urination, and then would shake. She denies fever or chills. She states she gets hot flashes. She does follow with Urology, but she states that they have told

## 2025-07-04 LAB
MICROORGANISM SPEC CULT: ABNORMAL
SERVICE CMNT-IMP: ABNORMAL
SPECIMEN DESCRIPTION: ABNORMAL

## 2025-07-07 ENCOUNTER — RESULTS FOLLOW-UP (OUTPATIENT)
Dept: FAMILY MEDICINE CLINIC | Age: 70
End: 2025-07-07

## 2025-07-16 ENCOUNTER — HOSPITAL ENCOUNTER (OUTPATIENT)
Age: 70
Discharge: HOME | End: 2025-07-18
Payer: MEDICARE

## 2025-07-16 ENCOUNTER — HOSPITAL ENCOUNTER (OUTPATIENT)
Dept: VASCULAR LAB | Age: 70
Discharge: HOME | End: 2025-07-18
Payer: MEDICARE

## 2025-07-16 DIAGNOSIS — R06.02 SHORTNESS OF BREATH: ICD-10-CM

## 2025-07-16 DIAGNOSIS — R42 DIZZINESS: ICD-10-CM

## 2025-07-16 LAB
ECHO AO ROOT DIAM: 2.9 CM
ECHO AV AREA PEAK VELOCITY: 3.1 CM2
ECHO AV AREA VTI: 2.8 CM2
ECHO AV MEAN GRADIENT: 3 MMHG
ECHO AV MEAN VELOCITY: 0.8 M/S
ECHO AV PEAK GRADIENT: 4 MMHG
ECHO AV PEAK VELOCITY: 1 M/S
ECHO AV VELOCITY RATIO: 1
ECHO AV VTI: 26.7 CM
ECHO EST RA PRESSURE: 3 MMHG
ECHO LA AREA 2C: 15 CM2
ECHO LA AREA 4C: 12.3 CM2
ECHO LA DIAMETER: 3.5 CM
ECHO LA MAJOR AXIS: 4.7 CM
ECHO LA MINOR AXIS: 5.4 CM
ECHO LA TO AORTIC ROOT RATIO: 1.21
ECHO LA VOL BP: 33 ML (ref 22–52)
ECHO LA VOL MOD A2C: 36 ML (ref 22–52)
ECHO LA VOL MOD A4C: 27 ML (ref 22–52)
ECHO LV E' LATERAL VELOCITY: 12.9 CM/S
ECHO LV E' SEPTAL VELOCITY: 10.8 CM/S
ECHO LV EF PHYSICIAN: 65 %
ECHO LV FRACTIONAL SHORTENING: 35 % (ref 28–44)
ECHO LV INTERNAL DIMENSION DIASTOLIC: 4.3 CM (ref 3.9–5.3)
ECHO LV INTERNAL DIMENSION SYSTOLIC: 2.8 CM
ECHO LV IVSD: 0.9 CM (ref 0.6–0.9)
ECHO LV MASS 2D: 123.3 G (ref 67–162)
ECHO LV POSTERIOR WALL DIASTOLIC: 0.9 CM (ref 0.6–0.9)
ECHO LV RELATIVE WALL THICKNESS RATIO: 0.42
ECHO LVOT AREA: 3.1 CM2
ECHO LVOT AV VTI INDEX: 0.89
ECHO LVOT DIAM: 2 CM
ECHO LVOT MEAN GRADIENT: 2 MMHG
ECHO LVOT PEAK GRADIENT: 4 MMHG
ECHO LVOT PEAK VELOCITY: 1 M/S
ECHO LVOT SV: 74.4 ML
ECHO LVOT VTI: 23.7 CM
ECHO MV A VELOCITY: 0.98 M/S
ECHO MV AREA VTI: 2.5 CM2
ECHO MV E DECELERATION TIME (DT): 217 MS
ECHO MV E VELOCITY: 0.88 M/S
ECHO MV E/A RATIO: 0.9
ECHO MV E/E' LATERAL: 6.82
ECHO MV E/E' RATIO (AVERAGED): 7.48
ECHO MV E/E' SEPTAL: 8.15
ECHO MV LVOT VTI INDEX: 1.27
ECHO MV MAX VELOCITY: 0.9 M/S
ECHO MV MEAN GRADIENT: 1 MMHG
ECHO MV MEAN VELOCITY: 0.6 M/S
ECHO MV PEAK GRADIENT: 3 MMHG
ECHO MV VTI: 30.1 CM
ECHO RA AREA 4C: 10.3 CM2
ECHO RA VOLUME: 18 ML
ECHO RIGHT VENTRICULAR SYSTOLIC PRESSURE (RVSP): 24 MMHG
ECHO RV BASAL DIMENSION: 2.6 CM
ECHO RV FREE WALL PEAK S': 14.5 CM/S
ECHO RV TAPSE: 2.4 CM (ref 1.7–?)
ECHO TV REGURGITANT MAX VELOCITY: 2.3 M/S
ECHO TV REGURGITANT PEAK GRADIENT: 21 MMHG
VAS LEFT CCA DIST EDV: 26.6 CM/S
VAS LEFT CCA DIST PSV: 77.7 CM/S
VAS LEFT CCA PROX EDV: 26.7 CM/S
VAS LEFT CCA PROX PSV: 81.5 CM/S
VAS LEFT ECA EDV: 12 CM/S
VAS LEFT ECA PSV: 85 CM/S
VAS LEFT ICA DIST EDV: 68.9 CM/S
VAS LEFT ICA DIST PSV: 172.5 CM/S
VAS LEFT ICA PROX EDV: 21.2 CM/S
VAS LEFT ICA PROX PSV: 55.8 CM/S
VAS LEFT ICA/CCA PSV: 2.2 NO UNITS
VAS LEFT VERTEBRAL EDV: 21.7 CM/S
VAS LEFT VERTEBRAL PSV: 51 CM/S
VAS RIGHT CCA DIST EDV: 23.5 CM/S
VAS RIGHT CCA DIST PSV: 80.1 CM/S
VAS RIGHT CCA PROX EDV: 21.7 CM/S
VAS RIGHT CCA PROX PSV: 72.9 CM/S
VAS RIGHT ECA EDV: 7.1 CM/S
VAS RIGHT ECA PSV: 96.5 CM/S
VAS RIGHT ICA DIST EDV: 34.5 CM/S
VAS RIGHT ICA DIST PSV: 81.9 CM/S
VAS RIGHT ICA PROX EDV: 21.7 CM/S
VAS RIGHT ICA PROX PSV: 70.9 CM/S
VAS RIGHT ICA/CCA PSV: 1 NO UNITS
VAS RIGHT VERTEBRAL EDV: 15.3 CM/S
VAS RIGHT VERTEBRAL PSV: 33.9 CM/S

## 2025-07-16 PROCEDURE — 93880 EXTRACRANIAL BILAT STUDY: CPT

## 2025-07-16 PROCEDURE — 93880 EXTRACRANIAL BILAT STUDY: CPT | Performed by: SURGERY

## 2025-07-16 PROCEDURE — 93306 TTE W/DOPPLER COMPLETE: CPT

## 2025-07-28 ENCOUNTER — TELEPHONE (OUTPATIENT)
Dept: FAMILY MEDICINE CLINIC | Age: 70
End: 2025-07-28

## 2025-07-28 DIAGNOSIS — R42 DIZZINESS: Primary | ICD-10-CM

## 2025-08-01 ENCOUNTER — TRANSCRIBE ORDERS (OUTPATIENT)
Dept: ADMINISTRATIVE | Age: 70
End: 2025-08-01

## 2025-08-01 DIAGNOSIS — T82.03XA LEAKAGE OF HEART VALVE PROSTHESIS, INITIAL ENCOUNTER: ICD-10-CM

## 2025-08-01 DIAGNOSIS — R06.02 SOB (SHORTNESS OF BREATH): Primary | ICD-10-CM

## 2025-08-06 ENCOUNTER — HOSPITAL ENCOUNTER (OUTPATIENT)
Age: 70
Setting detail: SPECIMEN
Discharge: HOME | End: 2025-08-06

## 2025-08-06 ENCOUNTER — OFFICE VISIT (OUTPATIENT)
Dept: OBGYN CLINIC | Age: 70
End: 2025-08-06
Payer: MEDICARE

## 2025-08-06 VITALS
BODY MASS INDEX: 26.81 KG/M2 | SYSTOLIC BLOOD PRESSURE: 131 MMHG | WEIGHT: 170.8 LBS | DIASTOLIC BLOOD PRESSURE: 80 MMHG | HEIGHT: 67 IN

## 2025-08-06 DIAGNOSIS — N89.8 VAGINAL ITCHING: ICD-10-CM

## 2025-08-06 DIAGNOSIS — T83.89XA VAGINAL IRRITATION FROM PESSARY: Primary | ICD-10-CM

## 2025-08-06 DIAGNOSIS — N89.8 VAGINAL IRRITATION FROM PESSARY: Primary | ICD-10-CM

## 2025-08-06 LAB
CANDIDA SPECIES: NEGATIVE
GARDNERELLA VAGINALIS: NEGATIVE
SOURCE: NORMAL
TRICHOMONAS: NEGATIVE

## 2025-08-06 PROCEDURE — 1090F PRES/ABSN URINE INCON ASSESS: CPT | Performed by: OBSTETRICS & GYNECOLOGY

## 2025-08-06 PROCEDURE — 1036F TOBACCO NON-USER: CPT | Performed by: OBSTETRICS & GYNECOLOGY

## 2025-08-06 PROCEDURE — 1159F MED LIST DOCD IN RCRD: CPT | Performed by: OBSTETRICS & GYNECOLOGY

## 2025-08-06 PROCEDURE — G8427 DOCREV CUR MEDS BY ELIG CLIN: HCPCS | Performed by: OBSTETRICS & GYNECOLOGY

## 2025-08-06 PROCEDURE — 1123F ACP DISCUSS/DSCN MKR DOCD: CPT | Performed by: OBSTETRICS & GYNECOLOGY

## 2025-08-06 PROCEDURE — 99212 OFFICE O/P EST SF 10 MIN: CPT | Performed by: OBSTETRICS & GYNECOLOGY

## 2025-08-06 PROCEDURE — 3017F COLORECTAL CA SCREEN DOC REV: CPT | Performed by: OBSTETRICS & GYNECOLOGY

## 2025-08-06 PROCEDURE — G8417 CALC BMI ABV UP PARAM F/U: HCPCS | Performed by: OBSTETRICS & GYNECOLOGY

## 2025-08-06 PROCEDURE — G8400 PT W/DXA NO RESULTS DOC: HCPCS | Performed by: OBSTETRICS & GYNECOLOGY

## 2025-08-06 ASSESSMENT — ENCOUNTER SYMPTOMS
COUGH: 0
SHORTNESS OF BREATH: 0
BACK PAIN: 0
ABDOMINAL PAIN: 0

## 2025-08-11 ENCOUNTER — HOSPITAL ENCOUNTER (OUTPATIENT)
Dept: CT IMAGING | Age: 70
Discharge: HOME | End: 2025-08-13
Attending: OTOLARYNGOLOGY
Payer: MEDICARE

## 2025-08-11 DIAGNOSIS — R06.02 SOB (SHORTNESS OF BREATH): ICD-10-CM

## 2025-08-11 DIAGNOSIS — T82.03XA LEAKAGE OF HEART VALVE PROSTHESIS, INITIAL ENCOUNTER: ICD-10-CM

## 2025-08-11 PROCEDURE — 71250 CT THORAX DX C-: CPT

## 2025-08-29 ENCOUNTER — HOSPITAL ENCOUNTER (OUTPATIENT)
Age: 70
Setting detail: OUTPATIENT SURGERY
Discharge: HOME | End: 2025-08-29
Attending: STUDENT IN AN ORGANIZED HEALTH CARE EDUCATION/TRAINING PROGRAM | Admitting: STUDENT IN AN ORGANIZED HEALTH CARE EDUCATION/TRAINING PROGRAM
Payer: MEDICARE

## 2025-08-29 VITALS
WEIGHT: 170 LBS | HEART RATE: 69 BPM | DIASTOLIC BLOOD PRESSURE: 79 MMHG | BODY MASS INDEX: 26.68 KG/M2 | RESPIRATION RATE: 23 BRPM | HEIGHT: 67 IN | OXYGEN SATURATION: 97 % | SYSTOLIC BLOOD PRESSURE: 101 MMHG

## 2025-08-29 DIAGNOSIS — R93.89 ABNORMAL COMPUTED TOMOGRAPHY ANGIOGRAPHY (CTA): ICD-10-CM

## 2025-08-29 DIAGNOSIS — R06.02 SOB (SHORTNESS OF BREATH): ICD-10-CM

## 2025-08-29 LAB
BUN BLD-MCNC: 10 MG/DL (ref 8–26)
CHLORIDE BLD-SCNC: 104 MMOL/L (ref 98–107)
ECHO BSA: 1.91 M2
EGFR, POC: >90 ML/MIN/1.73M2
GLUCOSE BLD-MCNC: 94 MG/DL (ref 74–100)
HCT VFR BLD AUTO: 45 % (ref 36–46)
PLATELET # BLD AUTO: 175 K/UL (ref 138–453)
POC CREATININE: 0.7 MG/DL (ref 0.51–1.19)
POC HEMOGLOBIN (CALC): 15.2 G/DL (ref 12–16)
POTASSIUM BLD-SCNC: 3.7 MMOL/L (ref 3.5–4.5)
SODIUM BLD-SCNC: 146 MMOL/L (ref 138–146)

## 2025-08-29 PROCEDURE — 6370000000 HC RX 637 (ALT 250 FOR IP): Performed by: STUDENT IN AN ORGANIZED HEALTH CARE EDUCATION/TRAINING PROGRAM

## 2025-08-29 PROCEDURE — 82435 ASSAY OF BLOOD CHLORIDE: CPT

## 2025-08-29 PROCEDURE — 93458 L HRT ARTERY/VENTRICLE ANGIO: CPT | Performed by: STUDENT IN AN ORGANIZED HEALTH CARE EDUCATION/TRAINING PROGRAM

## 2025-08-29 PROCEDURE — 2580000003 HC RX 258: Performed by: STUDENT IN AN ORGANIZED HEALTH CARE EDUCATION/TRAINING PROGRAM

## 2025-08-29 PROCEDURE — 7100000011 HC PHASE II RECOVERY - ADDTL 15 MIN: Performed by: STUDENT IN AN ORGANIZED HEALTH CARE EDUCATION/TRAINING PROGRAM

## 2025-08-29 PROCEDURE — 82947 ASSAY GLUCOSE BLOOD QUANT: CPT

## 2025-08-29 PROCEDURE — 2709999900 HC NON-CHARGEABLE SUPPLY: Performed by: STUDENT IN AN ORGANIZED HEALTH CARE EDUCATION/TRAINING PROGRAM

## 2025-08-29 PROCEDURE — 7100000010 HC PHASE II RECOVERY - FIRST 15 MIN: Performed by: STUDENT IN AN ORGANIZED HEALTH CARE EDUCATION/TRAINING PROGRAM

## 2025-08-29 PROCEDURE — 6360000002 HC RX W HCPCS: Performed by: STUDENT IN AN ORGANIZED HEALTH CARE EDUCATION/TRAINING PROGRAM

## 2025-08-29 PROCEDURE — 84295 ASSAY OF SERUM SODIUM: CPT

## 2025-08-29 PROCEDURE — 85049 AUTOMATED PLATELET COUNT: CPT

## 2025-08-29 PROCEDURE — 2500000003 HC RX 250 WO HCPCS: Performed by: STUDENT IN AN ORGANIZED HEALTH CARE EDUCATION/TRAINING PROGRAM

## 2025-08-29 PROCEDURE — 6360000004 HC RX CONTRAST MEDICATION: Performed by: STUDENT IN AN ORGANIZED HEALTH CARE EDUCATION/TRAINING PROGRAM

## 2025-08-29 PROCEDURE — 84520 ASSAY OF UREA NITROGEN: CPT

## 2025-08-29 PROCEDURE — C1894 INTRO/SHEATH, NON-LASER: HCPCS | Performed by: STUDENT IN AN ORGANIZED HEALTH CARE EDUCATION/TRAINING PROGRAM

## 2025-08-29 PROCEDURE — 84132 ASSAY OF SERUM POTASSIUM: CPT

## 2025-08-29 PROCEDURE — C1769 GUIDE WIRE: HCPCS | Performed by: STUDENT IN AN ORGANIZED HEALTH CARE EDUCATION/TRAINING PROGRAM

## 2025-08-29 PROCEDURE — 85014 HEMATOCRIT: CPT

## 2025-08-29 PROCEDURE — 82565 ASSAY OF CREATININE: CPT

## 2025-08-29 RX ORDER — IOPAMIDOL 755 MG/ML
INJECTION, SOLUTION INTRAVASCULAR PRN
Status: DISCONTINUED | OUTPATIENT
Start: 2025-08-29 | End: 2025-08-29 | Stop reason: HOSPADM

## 2025-08-29 RX ORDER — SODIUM CHLORIDE 0.9 % (FLUSH) 0.9 %
5-40 SYRINGE (ML) INJECTION PRN
Status: DISCONTINUED | OUTPATIENT
Start: 2025-08-29 | End: 2025-08-29 | Stop reason: HOSPADM

## 2025-08-29 RX ORDER — SODIUM CHLORIDE 0.9 % (FLUSH) 0.9 %
5-40 SYRINGE (ML) INJECTION EVERY 12 HOURS SCHEDULED
Status: DISCONTINUED | OUTPATIENT
Start: 2025-08-29 | End: 2025-08-29 | Stop reason: HOSPADM

## 2025-08-29 RX ORDER — SODIUM CHLORIDE 9 MG/ML
INJECTION, SOLUTION INTRAVENOUS CONTINUOUS
Status: DISCONTINUED | OUTPATIENT
Start: 2025-08-29 | End: 2025-08-29 | Stop reason: HOSPADM

## 2025-08-29 RX ORDER — MIDAZOLAM HYDROCHLORIDE 1 MG/ML
INJECTION, SOLUTION INTRAMUSCULAR; INTRAVENOUS PRN
Status: DISCONTINUED | OUTPATIENT
Start: 2025-08-29 | End: 2025-08-29 | Stop reason: HOSPADM

## 2025-08-29 RX ORDER — SODIUM CHLORIDE 9 MG/ML
INJECTION, SOLUTION INTRAVENOUS PRN
Status: DISCONTINUED | OUTPATIENT
Start: 2025-08-29 | End: 2025-08-29 | Stop reason: HOSPADM

## 2025-08-29 RX ORDER — FENTANYL CITRATE 50 UG/ML
INJECTION, SOLUTION INTRAMUSCULAR; INTRAVENOUS PRN
Status: DISCONTINUED | OUTPATIENT
Start: 2025-08-29 | End: 2025-08-29 | Stop reason: HOSPADM

## 2025-08-29 RX ORDER — ASPIRIN 325 MG
325 TABLET ORAL ONCE
Status: COMPLETED | OUTPATIENT
Start: 2025-08-29 | End: 2025-08-29

## 2025-08-29 RX ORDER — 0.9 % SODIUM CHLORIDE 0.9 %
INTRAVENOUS SOLUTION INTRAVENOUS CONTINUOUS PRN
Status: COMPLETED | OUTPATIENT
Start: 2025-08-29 | End: 2025-08-29

## 2025-08-29 RX ORDER — ACETAMINOPHEN 325 MG/1
650 TABLET ORAL EVERY 4 HOURS PRN
Status: DISCONTINUED | OUTPATIENT
Start: 2025-08-29 | End: 2025-08-29 | Stop reason: HOSPADM

## 2025-08-29 RX ADMIN — ASPIRIN 325 MG: 325 TABLET ORAL at 11:28

## 2025-09-05 ENCOUNTER — HOSPITAL ENCOUNTER (EMERGENCY)
Age: 70
Discharge: HOME | End: 2025-09-05
Attending: EMERGENCY MEDICINE
Payer: MEDICARE

## 2025-09-05 VITALS
WEIGHT: 169.75 LBS | SYSTOLIC BLOOD PRESSURE: 138 MMHG | BODY MASS INDEX: 25.73 KG/M2 | OXYGEN SATURATION: 95 % | HEART RATE: 68 BPM | HEIGHT: 68 IN | RESPIRATION RATE: 18 BRPM | DIASTOLIC BLOOD PRESSURE: 84 MMHG | TEMPERATURE: 99.1 F

## 2025-09-05 DIAGNOSIS — I80.8 SUPERFICIAL THROMBOPHLEBITIS OF RIGHT UPPER EXTREMITY: Primary | ICD-10-CM

## 2025-09-05 LAB
ANION GAP SERPL CALCULATED.3IONS-SCNC: 12 MMOL/L (ref 9–16)
BASOPHILS # BLD: 0.1 K/UL (ref 0–0.2)
BASOPHILS NFR BLD: 1 % (ref 0–2)
BUN SERPL-MCNC: 11 MG/DL (ref 8–23)
CALCIUM SERPL-MCNC: 9.7 MG/DL (ref 8.6–10.4)
CHLORIDE SERPL-SCNC: 104 MMOL/L (ref 98–107)
CO2 SERPL-SCNC: 28 MMOL/L (ref 20–31)
CREAT SERPL-MCNC: 0.8 MG/DL (ref 0.7–1.2)
D DIMER PPP FEU-MCNC: 0.64 UG/ML FEU (ref 0–0.59)
EOSINOPHIL # BLD: 0.1 K/UL (ref 0–0.4)
EOSINOPHILS RELATIVE PERCENT: 1 % (ref 1–4)
ERYTHROCYTE [DISTWIDTH] IN BLOOD BY AUTOMATED COUNT: 13.7 % (ref 12.5–15.4)
GFR, ESTIMATED: 79 ML/MIN/1.73M2
GLUCOSE SERPL-MCNC: 96 MG/DL (ref 74–99)
HCT VFR BLD AUTO: 41 % (ref 36–46)
HGB BLD-MCNC: 13.5 G/DL (ref 12–16)
LYMPHOCYTES NFR BLD: 2.4 K/UL (ref 1–4.8)
LYMPHOCYTES RELATIVE PERCENT: 29 % (ref 24–44)
MCH RBC QN AUTO: 28.6 PG (ref 26–34)
MCHC RBC AUTO-ENTMCNC: 32.9 G/DL (ref 31–37)
MCV RBC AUTO: 86.8 FL (ref 80–100)
MONOCYTES NFR BLD: 0.6 K/UL (ref 0.1–1.2)
MONOCYTES NFR BLD: 8 % (ref 2–11)
NEUTROPHILS NFR BLD: 61 % (ref 36–66)
NEUTS SEG NFR BLD: 5 K/UL (ref 1.8–7.7)
PLATELET # BLD AUTO: 180 K/UL (ref 140–450)
PMV BLD AUTO: 10 FL (ref 6–12)
POTASSIUM SERPL-SCNC: 3.7 MMOL/L (ref 3.7–5.3)
RBC # BLD AUTO: 4.72 M/UL (ref 4–5.2)
SODIUM SERPL-SCNC: 144 MMOL/L (ref 136–145)
WBC OTHER # BLD: 8.2 K/UL (ref 3.5–11)

## 2025-09-05 PROCEDURE — 36415 COLL VENOUS BLD VENIPUNCTURE: CPT

## 2025-09-05 PROCEDURE — 80048 BASIC METABOLIC PNL TOTAL CA: CPT

## 2025-09-05 PROCEDURE — 85025 COMPLETE CBC W/AUTO DIFF WBC: CPT

## 2025-09-05 PROCEDURE — 85379 FIBRIN DEGRADATION QUANT: CPT

## 2025-09-05 ASSESSMENT — PAIN SCALES - GENERAL: PAINLEVEL_OUTOF10: 4

## 2025-09-05 ASSESSMENT — PAIN DESCRIPTION - PAIN TYPE: TYPE: ACUTE PAIN

## 2025-09-05 ASSESSMENT — PAIN DESCRIPTION - LOCATION: LOCATION: ARM

## 2025-09-05 ASSESSMENT — PAIN DESCRIPTION - DESCRIPTORS: DESCRIPTORS: ACHING

## 2025-09-05 ASSESSMENT — PAIN DESCRIPTION - ORIENTATION: ORIENTATION: RIGHT

## 2025-09-05 ASSESSMENT — PAIN - FUNCTIONAL ASSESSMENT: PAIN_FUNCTIONAL_ASSESSMENT: 0-10

## (undated) DEVICE — GARMENT,MEDLINE,DVT,INT,CALF,MED, GEN2: Brand: MEDLINE

## (undated) DEVICE — YANKAUER,FLEXIBLE HANDLE,REGLR CAPACITY: Brand: MEDLINE INDUSTRIES, INC.

## (undated) DEVICE — [TOMCAT CUTTER, ARTHROSCOPIC SHAVER BLADE,  DO NOT RESTERILIZE,  DO NOT USE IF PACKAGE IS DAMAGED,  KEEP DRY,  KEEP AWAY FROM SUNLIGHT]: Brand: FORMULA

## (undated) DEVICE — HYPODERMIC SAFETY NEEDLE: Brand: MAGELLAN

## (undated) DEVICE — SYRINGE MED 5ML STD CLR PLAS LUERLOCK TIP N CTRL DISP

## (undated) DEVICE — PAD,ABDOMINAL,5"X9",ST,LF,25/BX: Brand: MEDLINE INDUSTRIES, INC.

## (undated) DEVICE — CONNECTOR TBNG AUX H2O JET DISP FOR OLY 160/180 SER

## (undated) DEVICE — NEEDLE HYPO 25GA L1.5IN BLU POLYPR HUB S STL REG BVL STR

## (undated) DEVICE — GOWN,AURORA,NONREINFORCED,LARGE: Brand: MEDLINE

## (undated) DEVICE — GLOVE ORANGE PI 7   MSG9070

## (undated) DEVICE — CATHETER URETH 18FR BLLN 5CC SIL ALLY W/ SIL HYDRGEL 2 W F

## (undated) DEVICE — BITEBLOCK ENDOSCP 60FR MAXI WHT POLYETH STURDY W/ VELC WVN

## (undated) DEVICE — PADDING UNDERCAST W6INXL4YD RAYON POLY SYN NONADHESIVE

## (undated) DEVICE — ADHESIVE SKIN CLSR 0.7ML TOP DERMBND ADV

## (undated) DEVICE — CRANIOTOMY DRAPE, STERILE: Brand: MEDLINE

## (undated) DEVICE — SUTURE VCRL SZ 3-0 L27IN ABSRB UD L26MM SH 1/2 CIR J416H

## (undated) DEVICE — COVER,MAYO STAND,STERILE: Brand: MEDLINE

## (undated) DEVICE — TRANSOBTURATOR MID-URETHRAL SYSTEM: Brand: OBTRYX™ SYSTEM - CURVED

## (undated) DEVICE — GUIDEWIRE VASC FIX COR J TIP 3MM .035INX260CM

## (undated) DEVICE — CYSTO/BLADDER IRRIGATION SET, REGULATING CLAMP

## (undated) DEVICE — GLOVE ORTHO 8   MSG9480

## (undated) DEVICE — SUTURE VCRL SZ 3-0 L27IN ABSRB UD L26MM CT-2 1/2 CIR J232H

## (undated) DEVICE — TOWEL,OR,DSP,ST,BLUE,DLX,XR,4/PK,20PK/CS: Brand: MEDLINE

## (undated) DEVICE — BAND COMPR L24CM REG CLR PLAS HEMSTAT EXT HK AND LOOP RETEN

## (undated) DEVICE — SOLUTION IRRIG 1000ML STRL H2O USP PLAS POUR BTL

## (undated) DEVICE — KIT INTRO 6FR L10CM MINI WIRE L45CM DIA0.021IN NDL 21GA ANT

## (undated) DEVICE — SINGLE PORT MANIFOLD: Brand: NEPTUNE 2

## (undated) DEVICE — ELECTRODE PT RET AD L9FT HI MOIST COND ADH HYDRGEL CORDED

## (undated) DEVICE — SYRINGE IRRIG 60ML SFT PLIABLE BLB EZ TO GRP 1 HND USE W/

## (undated) DEVICE — MERCY HEALTH ST CHARLES: Brand: MEDLINE INDUSTRIES, INC.

## (undated) DEVICE — Device

## (undated) DEVICE — GLOVE SURG SZ 65 THK91MIL LTX FREE SYN POLYISOPRENE

## (undated) DEVICE — COUNTER NDL 10 COUNT HLD 20 FOAM BLK SGL MAG

## (undated) DEVICE — SYRINGE MED 10ML TRNSLUC BRL PLUNG BLK MRK POLYPR CTRL

## (undated) DEVICE — PENCIL ES L3M BTTN SWCH HOLSTER W/ BLDE ELECTRD EDGE

## (undated) DEVICE — GLOVE ORANGE PI 7 1/2   MSG9075

## (undated) DEVICE — FORCEPS BX OVL CUP SERR DISP CAP L 240CM RAD JAW 4

## (undated) DEVICE — FORCEPS BX L240CM WRK CHN 2.8MM STD CAP W/ NDL MIC MESH

## (undated) DEVICE — PACK ARTHRO W PCH

## (undated) DEVICE — INTENDED FOR TISSUE SEPARATION, AND OTHER PROCEDURES THAT REQUIRE A SHARP SURGICAL BLADE TO PUNCTURE OR CUT.: Brand: BARD-PARKER ® CARBON RIB-BACK BLADES

## (undated) DEVICE — DRESSING,GAUZE,XEROFORM,CURAD,1"X8",ST: Brand: CURAD

## (undated) DEVICE — SUTURE ETHLN SZ 3-0 L18IN NONABSORBABLE BLK FS-1 L24MM 3/8 663H

## (undated) DEVICE — TUBING, SUCTION, 9/32" X 20', STRAIGHT: Brand: MEDLINE INDUSTRIES, INC.

## (undated) DEVICE — SUTURE VCRL SZ 0 L36IN ABSRB UD L36MM CT-1 1/2 CIR J946H

## (undated) DEVICE — 1016 S-DRAPE IRRIG POUCH 10/BOX: Brand: STERI-DRAPE™

## (undated) DEVICE — SOLUTION SCRB 4OZ 10% POVIDONE IOD ANTIMIC BTL

## (undated) DEVICE — TRAY SURG CUST CRD CATH TOLEDO

## (undated) DEVICE — SOLUTION PREP POVIDONE IOD FOR SKIN MUCOUS MEM PRIOR TO

## (undated) DEVICE — NEEDLE SPINAL 22GA L3.5IN SPINOCAN

## (undated) DEVICE — SOLUTION IV IRRIG LACTATED RINGERS 3000ML 2B7487

## (undated) DEVICE — DRAPE,REIN 53X77,STERILE: Brand: MEDLINE

## (undated) DEVICE — SUTURE PERMAHAND SZ 3-0 L18IN NONABSORBABLE BLK L26MM SH C013D

## (undated) DEVICE — SVMMC GYN MIN PK

## (undated) DEVICE — GLOVE SURG SZ 6 THK91MIL LTX FREE SYN POLYISOPRENE ANTI

## (undated) DEVICE — ZIMMER® STERILE DISPOSABLE TOURNIQUET CUFF WITH PLC, DUAL PORT, SINGLE BLADDER, 30 IN. (76 CM)

## (undated) DEVICE — KIT CLEANING BEDSIDE ENDOSCOPY

## (undated) DEVICE — STAZ ENDO KIT: Brand: MEDLINE INDUSTRIES, INC.